# Patient Record
Sex: MALE | Race: WHITE | Employment: OTHER | URBAN - NONMETROPOLITAN AREA
[De-identification: names, ages, dates, MRNs, and addresses within clinical notes are randomized per-mention and may not be internally consistent; named-entity substitution may affect disease eponyms.]

---

## 2019-01-03 RX ORDER — OXYCODONE HCL 10 MG/1
10 TABLET, FILM COATED, EXTENDED RELEASE ORAL EVERY 12 HOURS
COMMUNITY
End: 2019-04-15

## 2019-01-03 RX ORDER — GABAPENTIN 400 MG/1
400 CAPSULE ORAL 3 TIMES DAILY
COMMUNITY
End: 2019-04-15 | Stop reason: ALTCHOICE

## 2019-01-03 RX ORDER — TRAMADOL HYDROCHLORIDE 50 MG/1
50 TABLET ORAL EVERY 6 HOURS PRN
COMMUNITY
End: 2019-05-15 | Stop reason: SDUPTHER

## 2019-01-03 RX ORDER — IBUPROFEN 800 MG/1
800 TABLET ORAL EVERY 6 HOURS PRN
COMMUNITY
End: 2019-04-15

## 2019-04-15 ENCOUNTER — OFFICE VISIT (OUTPATIENT)
Dept: PAIN MANAGEMENT | Age: 59
End: 2019-04-15
Payer: MEDICARE

## 2019-04-15 VITALS
HEART RATE: 64 BPM | SYSTOLIC BLOOD PRESSURE: 138 MMHG | WEIGHT: 175 LBS | DIASTOLIC BLOOD PRESSURE: 80 MMHG | BODY MASS INDEX: 22.46 KG/M2 | HEIGHT: 74 IN

## 2019-04-15 DIAGNOSIS — M51.34 DDD (DEGENERATIVE DISC DISEASE), THORACIC: ICD-10-CM

## 2019-04-15 DIAGNOSIS — M47.812 CERVICAL FACET JOINT SYNDROME: Primary | ICD-10-CM

## 2019-04-15 DIAGNOSIS — M54.6 CHRONIC THORACIC BACK PAIN, UNSPECIFIED BACK PAIN LATERALITY: ICD-10-CM

## 2019-04-15 DIAGNOSIS — G89.29 CHRONIC THORACIC BACK PAIN, UNSPECIFIED BACK PAIN LATERALITY: ICD-10-CM

## 2019-04-15 DIAGNOSIS — M54.2 CERVICALGIA: ICD-10-CM

## 2019-04-15 PROCEDURE — 99203 OFFICE O/P NEW LOW 30 MIN: CPT | Performed by: NURSE PRACTITIONER

## 2019-04-15 RX ORDER — ACETAMINOPHEN 500 MG
500 TABLET ORAL EVERY 6 HOURS PRN
COMMUNITY
End: 2020-03-25

## 2019-04-15 RX ORDER — CYCLOBENZAPRINE HCL 10 MG
10 TABLET ORAL 3 TIMES DAILY PRN
Qty: 90 TABLET | Refills: 2 | Status: SHIPPED | OUTPATIENT
Start: 2019-04-15 | End: 2019-09-20 | Stop reason: SDUPTHER

## 2019-04-15 RX ORDER — GABAPENTIN 600 MG/1
600 TABLET ORAL 3 TIMES DAILY
Qty: 90 TABLET | Refills: 3 | Status: SHIPPED | OUTPATIENT
Start: 2019-04-15 | End: 2019-09-20 | Stop reason: SDUPTHER

## 2019-04-15 ASSESSMENT — ENCOUNTER SYMPTOMS: BACK PAIN: 1

## 2019-04-15 NOTE — PROGRESS NOTES
[facility] will be faxing over surgery history, and Dr Hardeep Calvert office will fax over the information as well

## 2019-04-15 NOTE — PROGRESS NOTES
Subjective:      Patient ID: Alton Fraley is a 60 y.o. male. Chief Complaint   Patient presents with    Neck Pain     Neck pain radiates into upper back    Other     The patient states that he has had 3 neck surgeries by Dr. Jl Stevens Other     The patient did over 2 months of PT and chiropractic care in the past.      HPI Initial new patient consult. Chronic neck pain, radiates right arm, tingling. He has underwent three cervical surgeries, surgeon is telling him he may need a 1th. Has never underwent cervical epidurals. Back (thoracic) pain worse than neck. He has underwent physical therapy, provided short term relief. He has trialed Percocet, oxycontin (improved his sleep), methadone in the past with relief. Denies overtaking of opiates. Failed OTC NSAIDs, reflux. Muscle relaxers in the past have helped, does not recall which ones. Tramadol prn without significant relief, requesting alternate opiate     Pain Assessment  Location of Pain: Neck  Location Modifiers: (pain in neck and in between shoulder blades. Headaches)  Severity of Pain: 8  Quality of Pain: Sharp(stiff)  Duration of Pain: Persistent  Frequency of Pain: Constant  Aggravating Factors: Bending(pressure, driving)  Limiting Behavior: Yes  Relieving Factors: (muscle relaxers. Tramadol)  Are there other pain locations you wish to document?: No    Allergies   Allergen Reactions    Codeine Itching    Diclofenac Nausea Only     dizziness       Outpatient Medications Marked as Taking for the 4/15/19 encounter (Office Visit) with ADOLPH Latif CNP   Medication Sig Dispense Refill    TAMSULOSIN HCL PO Take 0.5 mg by mouth      Probiotic Product (PROBIOTIC PO) Take by mouth      acetaminophen (TYLENOL) 500 MG tablet Take 500 mg by mouth every 6 hours as needed for Pain      gabapentin (NEURONTIN) 400 MG capsule Take 400 mg by mouth 3 times daily. [place] Eber traMADol (ULTRAM) 50 MG tablet Take 50 mg by mouth every 6 hours as needed for Pain. Dois Hallmark metoprolol (LOPRESSOR) 50 MG tablet Take 1 tablet by mouth 2 times daily. 60 tablet 1    albuterol-ipratropium (COMBIVENT)  MCG/ACT inhaler Inhale 2 puffs into the lungs 4 times daily as needed for Wheezing. 1 Inhaler 3    sucralfate (CARAFATE) 1 GM/10ML suspension Take 1 g by mouth 4 times daily (before meals and nightly).  ALPRAZolam (XANAX) 0.5 MG tablet Take 0.5 mg by mouth nightly as needed for Sleep.          Past Medical History:   Diagnosis Date    Anxiety     Back pain     Cervical neuropathy     CHF (congestive heart failure) (HCC)     Depression     ETOH abuse     HTN (hypertension)     Hyperlipidemia     Impaired memory     Prostate disease     Sleep apnea     Thrombocytopenia (HCC)        Past Surgical History:   Procedure Laterality Date    BACK SURGERY      4 or 5 plates implanted    CARDIAC SURGERY      stents x 1    COLONOSCOPY      FEMORAL LINE  3/1/2014         FRACTURE SURGERY      left shoulder, right leg    NECK SURGERY      PROSTATE BIOPSY         Family History   Problem Relation Age of Onset    Cancer Father        Social History     Socioeconomic History    Marital status:      Spouse name: None    Number of children: None    Years of education: None    Highest education level: None   Occupational History    None   Social Needs    Financial resource strain: None    Food insecurity:     Worry: None     Inability: None    Transportation needs:     Medical: None     Non-medical: None   Tobacco Use    Smoking status: Current Every Day Smoker     Packs/day: 1.00     Years: 25.00     Pack years: 25.00    Smokeless tobacco: Never Used   Substance and Sexual Activity    Alcohol use: Yes     Comment: 4-5 wine coolers daily, former beer and liquor drinker    Drug use: No    Sexual activity: None   Lifestyle    Physical activity:     Days per week: None     Minutes per session: None    Stress: None   Relationships    Social connections:     Talks on phone: None     Gets together: None     Attends Jainism service: None     Active member of club or organization: None     Attends meetings of clubs or organizations: None     Relationship status: None    Intimate partner violence:     Fear of current or ex partner: None     Emotionally abused: None     Physically abused: None     Forced sexual activity: None   Other Topics Concern    None   Social History Narrative    None     Review of Systems   Endocrine: Positive for cold intolerance. Musculoskeletal: Positive for arthralgias, back pain and neck pain. Skin: Negative. Neurological: Positive for numbness (right foot) and headaches. Psychiatric/Behavioral: Positive for sleep disturbance. Objective:   Physical Exam   Constitutional: He is oriented to person, place, and time. He appears well-developed and well-nourished. No distress. He is not intubated. HENT:   Head: Normocephalic and atraumatic. Pulmonary/Chest: Effort normal. No accessory muscle usage. No apnea, no tachypnea and no bradypnea. He is not intubated. No respiratory distress. Musculoskeletal:        Cervical back: He exhibits pain. Thoracic back: He exhibits pain (upper thoracic). Back:    Neurological: He is alert and oriented to person, place, and time. No cranial nerve deficit. Skin: Skin is warm, dry and intact. Capillary refill takes less than 2 seconds. He is not diaphoretic. No cyanosis. No pallor. Nails show no clubbing. Psychiatric: He has a normal mood and affect. His speech is normal and behavior is normal. Judgment normal. He is not agitated, not aggressive, not withdrawn and not combative. He does not express impulsivity or inappropriate judgment. Vitals reviewed. Assessment:      1. Cervical facet joint syndrome    2. Cervicalgia    3. DDD (degenerative disc disease), thoracic    4.  Chronic thoracic back pain, unspecified back pain laterality          Plan: Will attempt to get further records in regards to history overdose 2014, he denies. States he quit drinking, has not drank in 15-20 years. Will change gabapentin 600mg, add flexeril 10mg tid prn. Thoracic MRI reviewed, will schedule thoracic interlaminar, to be scheduled [facility].          Jay Pike, APRN - CNP

## 2019-04-15 NOTE — Clinical Note
Please attempt to get more detailed spine surgeries added into his history. States he has never had lumbar surgery, cervical surgery x3, Dr. Re Montes in [facility], surgeries done at [facility].  Please find out if he has had any updated MRI

## 2019-04-15 NOTE — PATIENT INSTRUCTIONS
[facility]  [address] [place], [address]  Telephone [phone]  Fax [phone]      PROCEDURE INSTRUCTIONS FOR  PAIN MANAGEMENT PROCEDURES WITHOUT IV SEDATION    Diana Cooper scheduled to see Dr. Quintin Day to undergo the following procedure:  C7/T1 interlaminar epidural steroid injection    Procedure Date: ____4/19/19____  Arrival Time: ____9:45am____     Report to the [place] [id number], Registration office on the 1st floor in the hospital, after check in and signing of paper work you will then go to the second floor to the surgery center. 1. Stop the following medications prior to the procedure:    Aspirin  Date__4/15/19__ : Stop blood thinners as directed before the injection, with permission from your cardiologist or primary care physician. We will send a letter to them requesting permission to hold the blood thinners. · Medication___Aspirin___ held for __3__ days    2. Take all routine medications unless otherwise instructed. Ok to take vitamins and antiinflammatory medications    3. EATING & DRINKING:  Ok to eat or drink before the injection - no IV sedation will be used. 4. If you are allergic to contrast or iodine, you must take benadryl and prednisone prior to the injection to prevent an allergic reaction. Follow the directions on the prescription for the times to take the medication. 5. Oral valium can be prescribed if your are anxious about the injections or feel that you can not lay still during the injection. If you take valium, you must have a . Make arrangements for a family member or friend to drive you to the surgery center. Your ride must stay in the hospital while you are having the injection done. If they cannot stay, the injection will be rescheduled. The valium may affect your judgment following the procedure and driving a vehicle within 24 hours after the sedation could be dangerous.     6.  Wear simple loose clothing, which can be easily changed. 7.  Leave jewelry (including rings) and other valuables at home. 8.  You will be asked to sign several forms prior to surgery; patients under the age of 25 must have a parent or legal guardian sign the permit to be able to do the procedure. 9.  You must have finished any antibiotic prescribed for recent infections. If required, please take pre-procedure antibiotic or other pre-procedure medications as instructed. 10. Bring inhalers and pain medications with you to your procedure. 11. Bring your MRI/CT films if they were done outside of the [facility]. 12. If you should develop a cold, sore throat, cough, fever or other new indication of illness or infection, or are started on antibiotics within 2 weeks of the scheduled procedure, please notify the [facility] office as early as possible at [phone].

## 2019-04-26 ENCOUNTER — HOSPITAL ENCOUNTER (OUTPATIENT)
Age: 59
Setting detail: OUTPATIENT SURGERY
Discharge: HOME | End: 2019-04-26
Attending: PHYSICAL MEDICINE & REHABILITATION | Admitting: PHYSICAL MEDICINE & REHABILITATION
Payer: MEDICARE

## 2019-04-26 ENCOUNTER — APPOINTMENT (OUTPATIENT)
Dept: GENERAL RADIOLOGY | Age: 59
End: 2019-04-26
Attending: PHYSICAL MEDICINE & REHABILITATION
Payer: MEDICARE

## 2019-04-26 VITALS
TEMPERATURE: 97.3 F | WEIGHT: 172.8 LBS | OXYGEN SATURATION: 99 % | BODY MASS INDEX: 22.18 KG/M2 | HEIGHT: 74 IN | DIASTOLIC BLOOD PRESSURE: 98 MMHG | HEART RATE: 52 BPM | SYSTOLIC BLOOD PRESSURE: 147 MMHG | RESPIRATION RATE: 16 BRPM

## 2019-04-26 PROBLEM — M54.12 CERVICAL RADICULITIS: Status: ACTIVE | Noted: 2019-04-26

## 2019-04-26 PROCEDURE — 6360000004 HC RX CONTRAST MEDICATION: Performed by: PHYSICAL MEDICINE & REHABILITATION

## 2019-04-26 PROCEDURE — 3209999900 FLUORO FOR SURGICAL PROCEDURES

## 2019-04-26 PROCEDURE — 2580000003 HC RX 258: Performed by: PHYSICAL MEDICINE & REHABILITATION

## 2019-04-26 PROCEDURE — 6360000002 HC RX W HCPCS: Performed by: PHYSICAL MEDICINE & REHABILITATION

## 2019-04-26 PROCEDURE — 3600000054 HC PAIN LEVEL 3 BASE: Performed by: PHYSICAL MEDICINE & REHABILITATION

## 2019-04-26 PROCEDURE — 7100000010 HC PHASE II RECOVERY - FIRST 15 MIN: Performed by: PHYSICAL MEDICINE & REHABILITATION

## 2019-04-26 PROCEDURE — 62321 NJX INTERLAMINAR CRV/THRC: CPT | Performed by: PHYSICAL MEDICINE & REHABILITATION

## 2019-04-26 PROCEDURE — 2709999900 HC NON-CHARGEABLE SUPPLY: Performed by: PHYSICAL MEDICINE & REHABILITATION

## 2019-04-26 PROCEDURE — 2500000003 HC RX 250 WO HCPCS: Performed by: PHYSICAL MEDICINE & REHABILITATION

## 2019-04-26 RX ORDER — DEXAMETHASONE SODIUM PHOSPHATE 10 MG/ML
INJECTION INTRAMUSCULAR; INTRAVENOUS PRN
Status: DISCONTINUED | OUTPATIENT
Start: 2019-04-26 | End: 2019-04-26 | Stop reason: ALTCHOICE

## 2019-04-26 RX ORDER — BUPIVACAINE HYDROCHLORIDE 2.5 MG/ML
INJECTION, SOLUTION EPIDURAL; INFILTRATION; INTRACAUDAL PRN
Status: DISCONTINUED | OUTPATIENT
Start: 2019-04-26 | End: 2019-04-26 | Stop reason: ALTCHOICE

## 2019-04-26 RX ORDER — 0.9 % SODIUM CHLORIDE 0.9 %
VIAL (ML) INJECTION PRN
Status: DISCONTINUED | OUTPATIENT
Start: 2019-04-26 | End: 2019-04-26 | Stop reason: ALTCHOICE

## 2019-04-26 ASSESSMENT — PAIN SCALES - GENERAL: PAINLEVEL_OUTOF10: 5

## 2019-04-26 ASSESSMENT — PAIN - FUNCTIONAL ASSESSMENT: PAIN_FUNCTIONAL_ASSESSMENT: 0-10

## 2019-04-26 ASSESSMENT — PAIN DESCRIPTION - PAIN TYPE: TYPE: CHRONIC PAIN

## 2019-04-26 NOTE — INTERVAL H&P NOTE
I have interviewed and examined the patient and reviewed the recent History and Physical.  There have been no changes to the recent H&P documentation. The surgical consent form has been signed. Last anticoagulant medication use was:na    Premedication taken for contrast allergy? No    Valium taken for oral sedation? No    Outpatient Medications Marked as Taking for the 4/26/19 encounter [facility] Encounter)   Medication Sig Dispense Refill    Probiotic Product (PROBIOTIC PO) Take by mouth      gabapentin (NEURONTIN) 600 MG tablet Take 1 tablet by mouth 3 times daily for 30 days. 90 tablet 3    cyclobenzaprine (FLEXERIL) 10 MG tablet Take 1 tablet by mouth 3 times daily as needed for Muscle spasms 90 tablet 2    traMADol (ULTRAM) 50 MG tablet Take 50 mg by mouth every 6 hours as needed for Pain. [place] Abhishek metoprolol (LOPRESSOR) 50 MG tablet Take 1 tablet by mouth 2 times daily. 60 tablet 1    albuterol-ipratropium (COMBIVENT)  MCG/ACT inhaler Inhale 2 puffs into the lungs 4 times daily as needed for Wheezing. 1 Inhaler 3    sucralfate (CARAFATE) 1 GM/10ML suspension Take 1 g by mouth 4 times daily (before meals and nightly). The patient understands the planned operation and its associated risks and benefits and agrees to proceed.         Electronically signed by Rambolian Valenzuela MD on 4/26/2019 at 9:50 AM

## 2019-04-26 NOTE — OP NOTE
INTERLAMINAR EPIDURAL STEROID INJECTION    4/26/19    Surgeon: Mitali Rodriguez MD    Pre-operative Diagnosis:   Patient Active Problem List   Diagnosis Code    Respiratory distress R06.03    Leukocytosis D72.829    Methadone dependence ([facility] [address] F11.20    Alcohol abuse F10.10    Hyponatremia E87.1    S/P cardiac cath - No CAD (3/3/14-Dr. ascencio) D18.516    Acute and chronic respiratory failure with hypercapnia (HCC) J96.22    Overdose of opiate or related narcotic ([facility] [address] T40.601A    COPD (chronic obstructive pulmonary disease) ([facility] [address] J44.9    YOLANDA (obstructive sleep apnea) G47.33    Cervical facet joint syndrome M47.812    Cervicalgia M54.2    DDD (degenerative disc disease), thoracic M51.34    Chronic thoracic back pain M54.6, G89.29    Cervical radiculitis M54.12       Post-operative Diagnosis: Same    Assistants: none    INDICATION:  Interlaminar epidural injection has been requested for diagnostic and therapeutic reasons. Please see H&P for details on previous treatments, examination findings, and work up. Conservative treatment was ineffective i.e.: ice, NSAIDS, rest,     Patient is unable to perform the following ADL's: bathing, toileting, personal cares, ambulating, sitting and standing     Pain Assessment: 0-10  Pain Level: 7        Pain Location: Neck, Shoulder       Last Plain films: 2018    EXAMINATION:  1. C7-T1 Interlaminar radiculogram/epidurogram.   2. C7-T1 Interlaminar epidural anesthetic injection. 3. C7-T1 Interlaminar epidural steroid injection. CONSENT: Written consent was obtained from the patient on preprinted consent form after explaining the procedure, indications, potential complications and outcomes. Alternative treatments were also discussed. DISCUSSION: The patient was sterilely prepped and draped in the usual fashion in the   prone position. Time out was verified for correct patient, side, level and procedure.     SEDATION: No conscious sedation was performed during the procedure. The patient remained awake and conversed throughout the procedure. The patient underwent pulse oximetry and blood pressure monitoring independently by a trained observer, as well as by a physician. PROCEDURE:  Under image-intensifier control, a 25 gauge needle x 2.5 inch spinal needle was guided successfully into the epidural space employing a posterior midline/paramedian interlaminar approach. Needle aspiration was negative for heme or CSF. Instillation of  .5 mL of Omnipaque 240 contrast medium opacified the spinal nerve and demonstrated contiguous flow into the epidural space. No vascular spread was noted. Digital subtraction was not employed to evaluate for vascular spread. The patient was monitored for any untoward reaction to contrast medium before proceeding. The patient did not report pain reproduction in a concordant distribution. Following needle position verification, a test dose of  .5 mL of sterile normal saline was administered and patient monitored for any adverse effects. Then, 1 mL of Dexamethasone (Decadron 10 mg/mL) was instilled into the epidural space and the patient's response was again monitored. Finally, 1ml of 0.5% bupivacaine was then instilled. The patient's response was again monitored. The spinal needle was removed, and the patient's pain response, gait pattern, sensorimotor examination and range of motion were examined. The patient tolerated the procedure well and without complications and was noted to be in stable condition prior to discharge from the procedure center with discharge instructions. IMPRESSIONS:  1. C7-T1 Interlaminar epidurogram, epidural anesthesia and epidural steroid injection procedures accomplished without incident. EBL: no blood loss    SPECIMEN: none    RECOMMENDATIONS:  1. Complete and return Post-Procedure Pain and Activity Diary.    2. Contact the [address] for symptom exacerbation, fever or unusual symptoms. 3. Post-procedure care according to verbal and written discharge instructions    POST-PROCEDURE EPIDUROGRAPHY INTERPRETATION:    EXAMINATION: AP, lateral views. FLUORO TIME: 23 seconds    DISCUSSION: Spot views of the spine reveal normal alignment and segmentation. Spinal needle is positioned at the C7-T1 interlaminar space. Contrast spreads and outlines the epidural space. The epidurogram reveals excellent contrast flow. Visualized spine reveals See radiology report. Soft tissues reveal no abnormalities. IMPRESSION: C7-T1 interlaminar epidurogram/epineurogram reveals satisfactory needle position and contrast spread.      Electronically signed by Simon Contreras MD on 4/26/2019 at 9:50 AM

## 2019-05-15 ENCOUNTER — OFFICE VISIT (OUTPATIENT)
Dept: PAIN MANAGEMENT | Age: 59
End: 2019-05-15
Payer: MEDICARE

## 2019-05-15 VITALS — DIASTOLIC BLOOD PRESSURE: 80 MMHG | SYSTOLIC BLOOD PRESSURE: 115 MMHG

## 2019-05-15 DIAGNOSIS — F41.9 ANXIETY: ICD-10-CM

## 2019-05-15 DIAGNOSIS — M54.12 CERVICAL RADICULOPATHY: Primary | ICD-10-CM

## 2019-05-15 PROCEDURE — 99214 OFFICE O/P EST MOD 30 MIN: CPT | Performed by: PHYSICAL MEDICINE & REHABILITATION

## 2019-05-15 RX ORDER — TRAMADOL HYDROCHLORIDE 50 MG/1
50 TABLET ORAL EVERY 6 HOURS PRN
Qty: 120 TABLET | Refills: 0 | Status: SHIPPED | OUTPATIENT
Start: 2019-05-15 | End: 2019-06-26 | Stop reason: SDUPTHER

## 2019-05-15 RX ORDER — MIRTAZAPINE 15 MG/1
TABLET, FILM COATED ORAL
COMMUNITY
End: 2019-06-26

## 2019-05-15 RX ORDER — HYDRALAZINE HYDROCHLORIDE 25 MG/1
TABLET, FILM COATED ORAL
COMMUNITY
End: 2019-06-26

## 2019-05-15 ASSESSMENT — ENCOUNTER SYMPTOMS
RESPIRATORY NEGATIVE: 1
NAUSEA: 0
ALLERGIC/IMMUNOLOGIC NEGATIVE: 1
CONSTIPATION: 0
EYES NEGATIVE: 1
BACK PAIN: 1

## 2019-05-15 NOTE — PROGRESS NOTES
PAIN MANAGEMENT FOLLOW-UP NOTE  5/15/19    CHIEF COMPLAINT: This is a59 y.o. male patientwho returns to the [facility] with a history of Injections (put more pressure on the area) and Neck Pain (previous injury on neck (axle came down on head) neck surgeon would like to do another surgery has already had 3 surgeries)      Gonzalo Santaan returns today for  reevaluation. Since the visit, the patient reports that the pain is not changed. C7-T1 IESI not helpful    still paininn R and L neck  Slightly into R  arm     Location: Neck  Location Modifier: cervical  Severity of Pain: 8  Duration of Pain: Persistent  Frequency of Pain: Constant  Aggravating Factors: bending sideways  Limiting Behavior: Walking  Relieving Factors: sleep        Previous pain medication trials have included:          Mental health:    Patient feels - secondary to their current pain problems as described above. H/O depression and anxiety: No   Patient is not seeing psychologist orpsychiatrist   Abuse history? No    Employed? No    ANALGESIA:   Are your Current Pain medication (s) helping to decrease pain? No.   Current Pain score: Pain Score:   5    ADVERSE AFFECTS:   Medication Side Effects: No.    ACTIVITY:  Are you able to be more active with your pain medications? No      ABERRANT BEHAVIORS SINCE LAST VISIT? No    Review of Systems   Constitutional: Positive for fatigue. HENT: Negative. Eyes: Negative. Respiratory: Negative. Cardiovascular: Negative. Gastrointestinal: Negative for constipation and nausea. Endocrine: Negative. Genitourinary: Negative for difficulty urinating. Musculoskeletal: Positive for arthralgias, back pain and myalgias. Skin: Negative. Allergic/Immunologic: Negative. Neurological: Positive for weakness and numbness. Hematological: Negative. Psychiatric/Behavioral: Positive for sleep disturbance. All other systems reviewed and are negative.        Allergies   Allergen Reactions    Codeine Itching    Diclofenac Nausea Only     dizziness       Outpatient Medications Prior to Visit   Medication Sig Dispense Refill    hydrALAZINE (APRESOLINE) 25 MG tablet hydralazine 25 mg tablet   Take 1 tablet twice a day by oral route.  mirtazapine (REMERON) 15 MG tablet mirtazapine 15 mg tablet   Take 1 tablet every day by oral route.  TAMSULOSIN HCL PO Take 0.5 mg by mouth      Probiotic Product (PROBIOTIC PO) Take by mouth      acetaminophen (TYLENOL) 500 MG tablet Take 500 mg by mouth every 6 hours as needed for Pain      gabapentin (NEURONTIN) 600 MG tablet Take 1 tablet by mouth 3 times daily for 30 days. 90 tablet 3    cyclobenzaprine (FLEXERIL) 10 MG tablet Take 1 tablet by mouth 3 times daily as needed for Muscle spasms 90 tablet 2    traMADol (ULTRAM) 50 MG tablet Take 50 mg by mouth every 6 hours as needed for Pain. Suzzane Sae metoprolol (LOPRESSOR) 50 MG tablet Take 1 tablet by mouth 2 times daily. 60 tablet 1    albuterol-ipratropium (COMBIVENT)  MCG/ACT inhaler Inhale 2 puffs into the lungs 4 times daily as needed for Wheezing. 1 Inhaler 3    sucralfate (CARAFATE) 1 GM/10ML suspension Take 1 g by mouth 4 times daily (before meals and nightly).  ALPRAZolam (XANAX) 0.5 MG tablet Take 0.5 mg by mouth nightly as needed for Sleep. No facility-administered medications prior to visit.         Past Medical History:   Diagnosis Date    Anxiety     Back pain     Cervical neuropathy     CHF (congestive heart failure) (HCC)     Depression     ETOH abuse     HTN (hypertension)     Hyperlipidemia     Impaired memory     Prostate disease     Sleep apnea     Thrombocytopenia (HCC)        Past Surgical History:   Procedure Laterality Date    BACK SURGERY      4 or 5 plates implanted    CARDIAC SURGERY      stents x 1    CERVICAL SPINE SURGERY N/A 4/26/2019    C7/T1 INTERLAMINAR (CPT 78454) performed [facility] FEMORAL LINE  3/1/2014         FRACTURE SURGERY      left shoulder, right leg    NECK SURGERY      PROSTATE BIOPSY       Family History   Problem Relation Age of Onset    Cancer Father      Social History     Socioeconomic History    Marital status:      Spouse name: None    Number of children: None    Years of education: None    Highest education level: None   Occupational History    None   Social Needs    Financial resource strain: None    Food insecurity:     Worry: None     Inability: None    Transportation needs:     Medical: None     Non-medical: None   Tobacco Use    Smoking status: Current Every Day Smoker     Packs/day: 1.00     Years: 25.00     Pack years: 25.00    Smokeless tobacco: Never Used   Substance and Sexual Activity    Alcohol use: Not Currently     Comment: former daily    Drug use: No    Sexual activity: None   Lifestyle    Physical activity:     Days per week: None     Minutes per session: None    Stress: None   Relationships    Social connections:     Talks on phone: None     Gets together: None     Attends Taoist service: None     Active member of club or organization: None     Attends meetings of clubs or organizations: None     Relationship status: None    Intimate partner violence:     Fear of current or ex partner: None     Emotionally abused: None     Physically abused: None     Forced sexual activity: None   Other Topics Concern    None   Social History Narrative    None         Family and Social Historyreviewed in the electronic medical record. Imaging:Reviewed available imaging in our system with the patient. No results found. Objective:     Physical Exam:  Vitals:    05/15/19 1134   BP: 115/80   Site: Left Upper Arm   Position: Sitting   Cuff Size: Large Adult     Pain Score:   5    Physical Exam   Constitutional: He is oriented to person, place, and time. He appears well-developed and well-nourished.    HENT:   Head: Normocephalic and atraumatic. Mouth/Throat: Oropharynx is clear and moist and mucous membranes are normal.   Cardiovascular: Intact distal pulses and normal pulses. Warm extremities. Normal capillary refill. Pulmonary/Chest: Effort normal.   Regular respiratory rate. No acute dyspnea. No audible wheezing. Neurological: He is alert and oriented to person, place, and time. He has normal strength and normal reflexes. He displays no atrophy. No cranial nerve deficit or sensory deficit. He exhibits normal muscle tone. Skin: Skin is warm, dry and intact. Psychiatric: He has a normal mood and affect. His speech is normal and behavior is normal.     Back Exam     Range of Motion   Extension: normal   Flexion: normal   Lateral bend right: normal   Lateral bend left: normal   Rotation right: normal   Rotation left: normal     Muscle Strength   Right Quadriceps:  5/5   Left Quadriceps:  5/5   Right Hamstrings:  5/5   Left Hamstrings:  5/5     Tests   Straight leg raise right: negative  Straight leg raise left: negative    Other   Toe walk: normal  Heel walk: normal  Sensation: normal  Gait: normal                                      Assessment: This is a 60 y.o. male patient with:    Diagnosis:   Diagnosis Orders   1. Cervical radiculopathy     2. Anxiety         Medical Comorbidities:  As listed in the patient's past medical and surgical history    Functional Limitations:   Pain limits function and quality of life. Plan:   Hold on another Cervical C7 T1 IESI  Back to Dr. Mary Anne Fonseca to eval hardware     Meds:   Controlled Substances Monitoring: Pt educated about the risks of taking opiates,including increased sedation, constipation, slowed breathing, tolerance, dependence,and addiction. New Prescriptions    No medications on file      No orders of the defined types were placed in this encounter. No orders of the defined types were placed in this encounter. No follow-ups on file.     The patient expressed understanding of the above assessment and plan. Totaltime spent face to face with patient was 25 minutes inwhich  50% or more of the time was spent in counseling, education about risk andbenefits of the above plan, and coordination of care.

## 2019-06-20 DIAGNOSIS — M54.16 LUMBAR RADICULITIS: Primary | ICD-10-CM

## 2019-06-20 RX ORDER — TRAMADOL HYDROCHLORIDE 50 MG/1
50 TABLET ORAL SEE ADMIN INSTRUCTIONS
Qty: 240 TABLET | Refills: 0 | OUTPATIENT
Start: 2019-06-20 | End: 2019-07-20

## 2019-06-20 NOTE — TELEPHONE ENCOUNTER
Writer called the patient back and asked that he set up an OV. The patient stated that he just saw Dr. Gretel Garrett on 5/15/19 and was told that he was allowed to take tramadol 2 tabs 4 times a day. The patient would like Dr. Gretel Garrett to send in the new prescription. The patient was upset and stated he was not impressed by Galen Villaseñor and does not want to see her for follow ups, he also stated that he was upset that Dr. Gretel Garrett did not review his MRI and he did not send in a new prescription like he said he would. He was also upset that the procedure was not effective. Writer explained to the patient that it was not documented that the patient would be getting an increased dose of tramadol, so if it is something that was discussed then the office was not aware, however, it will be addressed with Dr. Gretel Garrett. The patient was informed that he will have to make a follow up with one of the providers in order to continue to get medication from the office. The patient stated that he will schedule an OV with Dr. Gretel Garrett, but not at this time. The patient is requesting that Tramadol 50mg 2tab q6hrs be sent to his pharmacy. Please advise.      Last Appt:  5/15/2019  Next Appt:   Visit date not found  Med verified in [address] [place] [address] Rd

## 2019-06-20 NOTE — TELEPHONE ENCOUNTER
Patient called the office, asking for a refill on Tramadol 50 mg. Pt stated that at the last OV with Dr. Britany Ward, told him that if 1 tablet was not enough to take 2 tablets every 6 hours    Writer reviewed the last OV plan: Hold on another Cervical C7 T1 IESI  Back to Dr. Lazo Chain to eval hardware     The directions on the script says 1 tablet every 6 hours. Pt has only a few days left.      Last Appt:  4/15/2019  Next Appt:   Visit date not found  Med verified in [address] [place] [address] Rd      Would you like the pt to schedule an OV to discuss

## 2019-06-26 ENCOUNTER — OFFICE VISIT (OUTPATIENT)
Dept: PAIN MANAGEMENT | Age: 59
End: 2019-06-26
Payer: MEDICARE

## 2019-06-26 VITALS
HEIGHT: 74 IN | BODY MASS INDEX: 23.1 KG/M2 | SYSTOLIC BLOOD PRESSURE: 122 MMHG | WEIGHT: 180 LBS | DIASTOLIC BLOOD PRESSURE: 80 MMHG

## 2019-06-26 DIAGNOSIS — F41.9 ANXIETY: ICD-10-CM

## 2019-06-26 DIAGNOSIS — M96.1 CERVICAL POST-LAMINECTOMY SYNDROME: ICD-10-CM

## 2019-06-26 DIAGNOSIS — M79.673 PAIN OF FOOT, UNSPECIFIED LATERALITY: ICD-10-CM

## 2019-06-26 DIAGNOSIS — Z79.891 ENCOUNTER FOR LONG-TERM OPIATE ANALGESIC USE: Primary | ICD-10-CM

## 2019-06-26 DIAGNOSIS — F10.11 HISTORY OF ALCOHOL ABUSE: ICD-10-CM

## 2019-06-26 DIAGNOSIS — M54.12 CERVICAL RADICULOPATHY: ICD-10-CM

## 2019-06-26 DIAGNOSIS — F32.1 CURRENT MODERATE EPISODE OF MAJOR DEPRESSIVE DISORDER, UNSPECIFIED WHETHER RECURRENT (HCC): ICD-10-CM

## 2019-06-26 PROCEDURE — 99214 OFFICE O/P EST MOD 30 MIN: CPT | Performed by: PHYSICAL MEDICINE & REHABILITATION

## 2019-06-26 RX ORDER — TRAMADOL HYDROCHLORIDE 50 MG/1
100 TABLET ORAL EVERY 6 HOURS PRN
Qty: 240 TABLET | Refills: 0 | Status: SHIPPED | OUTPATIENT
Start: 2019-06-26 | End: 2019-08-13 | Stop reason: SDUPTHER

## 2019-06-26 ASSESSMENT — ENCOUNTER SYMPTOMS
EYES NEGATIVE: 1
BACK PAIN: 1
NAUSEA: 0
ALLERGIC/IMMUNOLOGIC NEGATIVE: 1
RESPIRATORY NEGATIVE: 1
CONSTIPATION: 0

## 2019-06-26 NOTE — PROGRESS NOTES
PAIN MANAGEMENT FOLLOW-UP NOTE  6/26/19    CHIEF COMPLAINT: This is a59 y.o. male patientwho [facility] with a history of Neck Pain (the patient was taking 2 tramadol q6hrs, be stated that he was told that this would be okay at his last visit. The patient would like to prescription to be sent in for this dose.)      Joselito Banegas returns today for  reevaluation. Since the visit, the patient reports that the pain is not changed. States  pain in  R neck different from B  Neck  Pain prior to surgery No new pain down arms   R foot numb      Location: Back  Location Modifier: entire back, cervical  Severity of Pain: 8  Duration of Pain: Persistent  Frequency of Pain: Intermittent  Aggravating Factors: bending forwards  Limiting Behavior: Standing   Relieving Factors: narcotics        Previous pain medication trials have included:          Mental health:    Patient feels depression and anxiety secondary to their current pain problems as described above. H/O depression and anxiety:yes   Patient is not seeing psychologist orpsychiatrist   Abuse history? Yes alcohol 18 years   Employed? No    ANALGESIA:   Are your CurrenT  Current Pain score: Pain Score:   5    ADVERSE AFFECTS:   Medication Side Effects: No.    ACTIVITY:  Are you able to be more active with your pain medications? No      ABERRANT BEHAVIORS SINCE LAST VISIT? No    Review of Systems   Constitutional: Positive for fatigue. HENT: Negative. Eyes: Negative. Respiratory: Negative. Cardiovascular: Negative. Gastrointestinal: Negative for constipation and nausea. Endocrine: Negative. Genitourinary: Negative for difficulty urinating. Musculoskeletal: Positive for arthralgias, back pain and myalgias. Skin: Negative. Allergic/Immunologic: Negative. Neurological: Positive for weakness and numbness. Hematological: Negative. Psychiatric/Behavioral: Positive for sleep disturbance.    All other systems reviewed and are negative. Allergies   Allergen Reactions    Codeine Itching    Diclofenac Nausea Only     dizziness       Outpatient Medications Prior to Visit   Medication Sig Dispense Refill    TAMSULOSIN HCL PO Take 0.5 mg by mouth      acetaminophen (TYLENOL) 500 MG tablet Take 500 mg by mouth every 6 hours as needed for Pain      gabapentin (NEURONTIN) 600 MG tablet Take 1 tablet by mouth 3 times daily for 30 days. 90 tablet 3    ALPRAZolam (XANAX) 0.5 MG tablet Take 0.5 mg by mouth nightly as needed for Sleep.  hydrALAZINE (APRESOLINE) 25 MG tablet hydralazine 25 mg tablet   Take 1 tablet twice a day by oral route.  mirtazapine (REMERON) 15 MG tablet mirtazapine 15 mg tablet   Take 1 tablet every day by oral route.  Probiotic Product (PROBIOTIC PO) Take by mouth      metoprolol (LOPRESSOR) 50 MG tablet Take 1 tablet by mouth 2 times daily. 60 tablet 1    albuterol-ipratropium (COMBIVENT)  MCG/ACT inhaler Inhale 2 puffs into the lungs 4 times daily as needed for Wheezing. 1 Inhaler 3    sucralfate (CARAFATE) 1 GM/10ML suspension Take 1 g by mouth 4 times daily (before meals and nightly). No facility-administered medications prior to visit.         Past Medical History:   Diagnosis Date    Anxiety     Back pain     Cervical neuropathy     CHF (congestive heart failure) (HCC)     Depression     ETOH abuse     HTN (hypertension)     Hyperlipidemia     Impaired memory     Prostate disease     Sleep apnea     Thrombocytopenia (HCC)        Past Surgical History:   Procedure Laterality Date    BACK SURGERY      4 or 5 plates implanted    CARDIAC SURGERY      stents x 1    CERVICAL SPINE SURGERY N/A 4/26/2019    C7/T1 INTERLAMINAR (CPT 73812) performed by Anna Negrete MD at [address] [place] [address],[address]  3/1/2014         FRACTURE SURGERY      left shoulder, right leg    NECK SURGERY      PROSTATE BIOPSY       Family History   Problem Relation Age of Onset    Cancer Father      Social History     Socioeconomic History    Marital status:      Spouse name: None    Number of children: None    Years of education: None    Highest education level: None   Occupational History    None   Social Needs    Financial resource strain: None    Food insecurity:     Worry: None     Inability: None    Transportation needs:     Medical: None     Non-medical: None   Tobacco Use    Smoking status: Current Every Day Smoker     Packs/day: 1.00     Years: 25.00     Pack years: 25.00    Smokeless tobacco: Never Used   Substance and Sexual Activity    Alcohol use: Not Currently     Comment: former daily    Drug use: No    Sexual activity: None   Lifestyle    Physical activity:     Days per week: None     Minutes per session: None    Stress: None   Relationships    Social connections:     Talks on phone: None     Gets together: None     Attends Sikh service: None     Active member of club or organization: None     Attends meetings of clubs or organizations: None     Relationship status: None    Intimate partner violence:     Fear of current or ex partner: None     Emotionally abused: None     Physically abused: None     Forced sexual activity: None   Other Topics Concern    None   Social History Narrative    None         Family and Social Historyreviewed in the electronic medical record. Imaging:Reviewed available imaging in our system with the patient. No results found. Objective:     Physical Exam:  Vitals:    06/26/19 1138   BP: 122/80   Site: Left Upper Arm   Position: Sitting   Cuff Size: Medium Adult   Weight: 180 lb (81.6 kg)   Height: 6' 2\" (1.88 m)     Pain Score:   5    Physical Exam   Constitutional: He is oriented to person, place, and time. He appears well-developed and well-nourished. HENT:   Head: Normocephalic and atraumatic.    Mouth/Throat: Oropharynx is clear and moist and mucous membranes are normal.   Cardiovascular: Normal rate, intact distal pulses and normal pulses. Warm extremities. Normal capillary refill. Pulmonary/Chest: Effort normal.   Regular respiratory rate. No acute dyspnea. No audible wheezing. Abdominal: Soft. Bowel sounds are normal. He exhibits no distension. Neurological: He is alert and oriented to person, place, and time. He has normal strength and normal reflexes. He displays no atrophy. No cranial nerve deficit or sensory deficit. He exhibits normal muscle tone. Skin: Skin is warm, dry and intact. Psychiatric: He has a normal mood and affect. His speech is normal and behavior is normal.     Back Exam     Tenderness   The patient is experiencing tenderness in the cervical.    Range of Motion   Extension: normal   Flexion: normal   Lateral bend right: normal   Lateral bend left: normal   Rotation right: normal   Rotation left: normal     Muscle Strength   Right Quadriceps:  5/5   Left Quadriceps:  5/5   Right Hamstrings:  5/5   Left Hamstrings:  5/5     Tests   Straight leg raise right: negative  Straight leg raise left: negative    Other   Toe walk: normal  Heel walk: normal  Sensation: normal  Gait: normal     Comments:  Sensorimotor  intact B arms   tender R neck  +spurlings R                                       Assessment: This is a 60 y.o. male patient with:    Diagnosis:   Diagnosis Orders   1. Encounter for long-term opiate analgesic use     2. Cervical radiculopathy  traMADol (ULTRAM) 50 MG tablet   3. Anxiety  traMADol (ULTRAM) 50 MG tablet   4. Cervical post-laminectomy syndrome     5. Pain of foot, unspecified laterality     6. Current moderate episode of major depressive disorder, unspecified whether recurrent (HCC)         Medical Comorbidities:  As listed in the patient's past medical and surgical history    Functional Limitations:   Pain limits function and quality of life.      Plan:     Tramadol  100 Q 6 hours told cannot take more than prescribed   Follow with Dr Zina Randhawa Neurosurgery patient wants to be sure not hardware p;roblem  Patient wants  Second opinion sounds like he will run by Dr Zhanna Tristan   Only been 6 months post surgery w   Meds:   Controlled Substances Monitoring: Pt educated about the risks of taking opiates,including increased sedation, constipation, slowed breathing, tolerance, dependence,and addiction. New Prescriptions    No medications on file      Orders Placed This Encounter   Medications    traMADol (ULTRAM) 50 MG tablet     Sig: Take 2 tablets by mouth every 6 hours as needed for Pain for up to 30 days. Dispense:  240 tablet     Refill:  0     Reduce doses taken as pain becomes manageable     No orders of the defined types were placed in this encounter. No follow-ups on file. The patient expressed understanding of the above assessment and plan. Totaltime spent face to face with patient was 25 minutes inwhich  50% or more of the time was spent in counseling, education about risk andbenefits of the above plan, and coordination of care.

## 2019-07-24 ENCOUNTER — OFFICE VISIT (OUTPATIENT)
Dept: PAIN MANAGEMENT | Age: 59
End: 2019-07-24
Payer: MEDICARE

## 2019-07-24 VITALS
HEIGHT: 74 IN | WEIGHT: 175 LBS | BODY MASS INDEX: 22.46 KG/M2 | DIASTOLIC BLOOD PRESSURE: 72 MMHG | SYSTOLIC BLOOD PRESSURE: 120 MMHG

## 2019-07-24 DIAGNOSIS — Z79.891 ENCOUNTER FOR LONG-TERM OPIATE ANALGESIC USE: ICD-10-CM

## 2019-07-24 DIAGNOSIS — M47.812 CERVICAL FACET JOINT SYNDROME: ICD-10-CM

## 2019-07-24 DIAGNOSIS — M54.12 CERVICAL RADICULOPATHY: Primary | ICD-10-CM

## 2019-07-24 DIAGNOSIS — F10.10 ALCOHOL ABUSE: ICD-10-CM

## 2019-07-24 DIAGNOSIS — T40.601A OPIATE OR RELATED NARCOTIC OVERDOSE, ACCIDENTAL OR UNINTENTIONAL, INITIAL ENCOUNTER (HCC): ICD-10-CM

## 2019-07-24 PROCEDURE — 99214 OFFICE O/P EST MOD 30 MIN: CPT | Performed by: PHYSICAL MEDICINE & REHABILITATION

## 2019-07-24 ASSESSMENT — ENCOUNTER SYMPTOMS
ALLERGIC/IMMUNOLOGIC NEGATIVE: 1
EYES NEGATIVE: 1
NAUSEA: 0
CONSTIPATION: 0
BACK PAIN: 1
RESPIRATORY NEGATIVE: 1

## 2019-07-24 NOTE — PROGRESS NOTES
traMADol (ULTRAM) 50 MG tablet Take 2 tablets by mouth every 6 hours as needed for Pain for up to 30 days. 240 tablet 0    TAMSULOSIN HCL PO Take 0.5 mg by mouth      acetaminophen (TYLENOL) 500 MG tablet Take 500 mg by mouth every 6 hours as needed for Pain      gabapentin (NEURONTIN) 600 MG tablet Take 1 tablet by mouth 3 times daily for 30 days. 90 tablet 3    cyclobenzaprine (FLEXERIL) 10 MG tablet Take 1 tablet by mouth 3 times daily as needed for Muscle spasms 90 tablet 2    albuterol-ipratropium (COMBIVENT)  MCG/ACT inhaler Inhale 2 puffs into the lungs 4 times daily as needed for Wheezing. 1 Inhaler 3    ALPRAZolam (XANAX) 0.5 MG tablet Take 0.5 mg by mouth nightly as needed for Sleep. No facility-administered medications prior to visit.         Past Medical History:   Diagnosis Date    Anxiety     Back pain     Cervical neuropathy     CHF (congestive heart failure) (HCC)     Depression     ETOH abuse     HTN (hypertension)     Hyperlipidemia     Impaired memory     Prostate disease     Sleep apnea     Thrombocytopenia (HCC)        Past Surgical History:   Procedure Laterality Date    BACK SURGERY      4 or 5 plates implanted    CARDIAC SURGERY      stents x 1    CERVICAL SPINE SURGERY N/A 4/26/2019    C7/T1 INTERLAMINAR (CPT 78517) performed by Mirza Treviño MD at [address] [place] [address],[address]  3/1/2014         FRACTURE SURGERY      left shoulder, right leg    NECK SURGERY      PROSTATE BIOPSY       Family History   Problem Relation Age of Onset    Cancer Father      Social History     Socioeconomic History    Marital status:      Spouse name: None    Number of children: None    Years of education: None    Highest education level: None   Occupational History    None   Social Needs    Financial resource strain: None    Food insecurity:     Worry: None     Inability: None    Transportation needs:     Medical: None     Non-medical: None

## 2019-08-13 DIAGNOSIS — F41.9 ANXIETY: ICD-10-CM

## 2019-08-13 DIAGNOSIS — M54.12 CERVICAL RADICULOPATHY: ICD-10-CM

## 2019-08-13 RX ORDER — TRAMADOL HYDROCHLORIDE 50 MG/1
50 TABLET ORAL EVERY 6 HOURS PRN
Qty: 240 TABLET | Refills: 3 | OUTPATIENT
Start: 2019-08-13 | End: 2019-09-12

## 2019-09-20 RX ORDER — GABAPENTIN 600 MG/1
TABLET ORAL
Qty: 90 TABLET | Refills: 3 | Status: SHIPPED | OUTPATIENT
Start: 2019-09-20 | End: 2020-04-13

## 2019-09-20 RX ORDER — CYCLOBENZAPRINE HCL 10 MG
TABLET ORAL
Qty: 90 TABLET | Refills: 2 | Status: SHIPPED | OUTPATIENT
Start: 2019-09-20 | End: 2019-12-13 | Stop reason: SDUPTHER

## 2019-09-20 NOTE — TELEPHONE ENCOUNTER
Pharmacy is requesting a refill of medications    Last Appt:  4/15/2019  Next Appt:   07/24/2019  Med verified in Epic

## 2019-12-13 RX ORDER — CYCLOBENZAPRINE HCL 10 MG
TABLET ORAL
Qty: 90 TABLET | Refills: 0 | Status: SHIPPED | OUTPATIENT
Start: 2019-12-13 | End: 2020-01-10

## 2020-01-10 RX ORDER — CYCLOBENZAPRINE HCL 10 MG
TABLET ORAL
Qty: 90 TABLET | Refills: 5 | Status: SHIPPED | OUTPATIENT
Start: 2020-01-10 | End: 2020-07-20

## 2020-01-10 NOTE — TELEPHONE ENCOUNTER
Pharmacy requesting refill on pharmacy      Last Appt:  07/24/2019  Next Appt:   Visit date not found  Med verified in Epic    Patient has not been seen since July 2019, ok to fill?

## 2020-03-09 NOTE — TELEPHONE ENCOUNTER
Last Appt:  7/24/2019  Next Appt:   3/25/2020  Med verified in Epic    Pt not seen since July will need OV, OV scheduled 3/25/20    [place], and [place]:1/2/20 Tramadol 50 #240. Due NOW.

## 2020-03-10 RX ORDER — TRAMADOL HYDROCHLORIDE 50 MG/1
50 TABLET ORAL EVERY 6 HOURS PRN
Qty: 240 TABLET | Refills: 3 | OUTPATIENT
Start: 2020-03-10 | End: 2020-04-09

## 2020-03-25 ENCOUNTER — TELEPHONE (OUTPATIENT)
Dept: PAIN MANAGEMENT | Age: 60
End: 2020-03-25

## 2020-03-25 ENCOUNTER — OFFICE VISIT (OUTPATIENT)
Dept: PAIN MANAGEMENT | Age: 60
End: 2020-03-25
Payer: MEDICARE

## 2020-03-25 VITALS
DIASTOLIC BLOOD PRESSURE: 80 MMHG | HEIGHT: 74 IN | WEIGHT: 190 LBS | BODY MASS INDEX: 24.38 KG/M2 | SYSTOLIC BLOOD PRESSURE: 142 MMHG

## 2020-03-25 PROCEDURE — 99214 OFFICE O/P EST MOD 30 MIN: CPT | Performed by: PHYSICAL MEDICINE & REHABILITATION

## 2020-03-25 RX ORDER — TRAMADOL HYDROCHLORIDE 50 MG/1
50 TABLET ORAL EVERY 6 HOURS PRN
Qty: 120 TABLET | Refills: 0 | Status: SHIPPED | OUTPATIENT
Start: 2020-03-25 | End: 2020-04-23 | Stop reason: SDUPTHER

## 2020-03-25 NOTE — PROGRESS NOTES
PAIN MANAGEMENT FOLLOW-UP NOTE  3/25/20    CHIEF COMPLAINT: This is a58 y.o. male patientwho returns to the [facility] with a history of Neck Pain      PAIN Tia Parmar returns today for  reevaluation. Since the visit, the patient reports that the pain is not changed. R neck pain  States another surgery   Of neck planned     Location: Neck  Location Modifier: cervical R Severity of Pain: 7  Duration of Pain: Persistent  Frequency of Pain: Intermittent  Aggravating Factors: -  Limiting Behavior: Standing   Relieving Factors: narcotics        Previous pain medication trials have included:          Mental health:    Patient feels - secondary to their current pain problems as described above. H/O depression and anxiety: No   Patient is not seeing psychologist orpsychiatrist   Abuse history? No    Employed? No    ANALGESIA:   Are your Current Pain medication (s) helping to decrease pain? No.   Current Pain score: Pain Score:   8    ADVERSE AFFECTS:   Medication Side Effects: No.    ACTIVITY:  Are you able to be more active with your pain medications? No and       ABERRANT BEHAVIORS SINCE LAST VISIT? No    Review of Systems     Allergies   Allergen Reactions    Codeine Itching    Diclofenac Nausea Only     dizziness       Outpatient Medications Prior to Visit   Medication Sig Dispense Refill    traMADol (ULTRAM) 50 MG tablet Take 1 tablet by mouth every 6 hours as needed for Pain (Take 2 tablets by mouth every 6 hours as needed for pain for up to 30 days) for up to 30 days. 240 tablet 3    cyclobenzaprine (FLEXERIL) 10 MG tablet take 1 tablet by mouth three times a day if needed for muscle spasm 90 tablet 5    gabapentin (NEURONTIN) 600 MG tablet take 1 tablet by mouth three times a day 90 tablet 3    TAMSULOSIN HCL PO Take 0.5 mg by mouth      albuterol-ipratropium (COMBIVENT)  MCG/ACT inhaler Inhale 2 puffs into the lungs 4 times daily as needed for Wheezing.  1 Inhaler 3    ALPRAZolam Amanda Fujisawa) 0.5 MG tablet Take 0.5 mg by mouth nightly as needed for Sleep.  acetaminophen (TYLENOL) 500 MG tablet Take 500 mg by mouth every 6 hours as needed for Pain       No facility-administered medications prior to visit.         Past Medical History:   Diagnosis Date    Anxiety     Back pain     Cervical neuropathy     CHF (congestive heart failure) (HCC)     Depression     ETOH abuse     HTN (hypertension)     Hyperlipidemia     Impaired memory     Prostate disease     Sleep apnea     Thrombocytopenia (HCC)        Past Surgical History:   Procedure Laterality Date    BACK SURGERY      4 or 5 plates implanted    CARDIAC SURGERY      stents x 1    CERVICAL SPINE SURGERY N/A 4/26/2019    C7/T1 INTERLAMINAR (CPT 34665) performed by Baker Room, MD at [address] [facility],[address]  3/1/2014         FRACTURE SURGERY      left shoulder, right leg    NECK SURGERY      PROSTATE BIOPSY       Family History   Problem Relation Age of Onset    Cancer Father      Social History     Socioeconomic History    Marital status:      Spouse name: None    Number of children: None    Years of education: None    Highest education level: None   Occupational History    None   Social Needs    Financial resource strain: None    Food insecurity     Worry: None     Inability: None    Transportation needs     Medical: None     Non-medical: None   Tobacco Use    Smoking status: Current Every Day Smoker     Packs/day: 1.00     Years: 25.00     Pack years: 25.00    Smokeless tobacco: Never Used   Substance and Sexual Activity    Alcohol use: Not Currently     Comment: former daily    Drug use: No    Sexual activity: None   Lifestyle    Physical activity     Days per week: None     Minutes per session: None    Stress: None   Relationships    Social connections     Talks on phone: None     Gets together: None     Attends [employer] service: None     Active member of club or organization:

## 2020-03-25 NOTE — TELEPHONE ENCOUNTER
Pt stated that the Tramadol does help, but he still wants to take Tramadol 50 2 tab every 6 hours,     Please advise

## 2020-03-25 NOTE — TELEPHONE ENCOUNTER
Pt was seen in [facility] today and states he has been taking 2 tabs tramadol for a couple years now and today he picked up the prescription from pharmacy and it is for 1 tab every 6 hrs. Can this be changed?     [phone]

## 2020-03-29 LAB
6-ACETYLMORPHINE, UR: NORMAL
AMPHETAMINE SCREEN, URINE: NORMAL
BARBITURATE SCREEN, URINE: NORMAL
BENZODIAZEPINE SCREEN, URINE: NORMAL
CANNABINOID SCREEN URINE: NORMAL
COCAINE METABOLITE, URINE: NORMAL
CREATININE URINE: NORMAL
EDDP, URINE: NORMAL
ETHANOL URINE: NORMAL
MDMA URINE: NORMAL
METHADONE SCREEN, URINE: NORMAL
METHAMPHETAMINE, URINE: NORMAL
OPIATES, URINE: NORMAL
OXYCODONE: NORMAL
PCP: NORMAL
PH, URINE: NORMAL
PHENCYCLIDINE, URINE: NORMAL
PROPOXYPHENE, URINE: NORMAL
TRICYCLIC ANTIDEPRESSANTS, UR: NORMAL

## 2020-04-13 RX ORDER — GABAPENTIN 600 MG/1
600 TABLET ORAL 3 TIMES DAILY
Qty: 90 TABLET | Refills: 3 | OUTPATIENT
Start: 2020-04-13 | End: 2020-10-12

## 2020-04-13 NOTE — TELEPHONE ENCOUNTER
The patient requested a refill of gabapentin. Phoned in, please sign.      Last Appt:  4/15/2019  Next Appt:   Visit date not found  Med verified in Replaced by [facility] [address]

## 2020-04-23 RX ORDER — TRAMADOL HYDROCHLORIDE 50 MG/1
50 TABLET ORAL EVERY 6 HOURS PRN
Qty: 120 TABLET | Refills: 0 | Status: SHIPPED | OUTPATIENT
Start: 2020-04-24 | End: 2020-05-21 | Stop reason: SDUPTHER

## 2020-05-21 RX ORDER — TRAMADOL HYDROCHLORIDE 50 MG/1
50 TABLET ORAL EVERY 6 HOURS PRN
Qty: 120 TABLET | Refills: 2 | OUTPATIENT
Start: 2020-05-23 | End: 2020-07-08

## 2020-07-08 ENCOUNTER — OFFICE VISIT (OUTPATIENT)
Dept: PAIN MANAGEMENT | Age: 60
End: 2020-07-08
Payer: MEDICARE

## 2020-07-08 VITALS
DIASTOLIC BLOOD PRESSURE: 80 MMHG | WEIGHT: 190 LBS | BODY MASS INDEX: 24.38 KG/M2 | HEIGHT: 74 IN | SYSTOLIC BLOOD PRESSURE: 118 MMHG

## 2020-07-08 PROCEDURE — 99214 OFFICE O/P EST MOD 30 MIN: CPT | Performed by: PHYSICAL MEDICINE & REHABILITATION

## 2020-07-08 RX ORDER — NAPROXEN SODIUM 220 MG
220 TABLET ORAL 2 TIMES DAILY PRN
COMMUNITY
End: 2021-02-24

## 2020-07-08 ASSESSMENT — ENCOUNTER SYMPTOMS
NAUSEA: 0
EYES NEGATIVE: 1
BACK PAIN: 1
CONSTIPATION: 0
RESPIRATORY NEGATIVE: 1
ALLERGIC/IMMUNOLOGIC NEGATIVE: 1

## 2020-07-08 NOTE — PROGRESS NOTES
PAIN MANAGEMENT FOLLOW-UP NOTE  7/8/20    CHIEF COMPLAINT: This is a58 y.o. male patientwho returns to the [facility] with a history of Neck Pain (right side of neck. Tramadol not helping)      Donna Pulido returns today for  reevaluation. Since the visit, the patient reports that the pain is not changed. R hand and crampy  Every few days or when  Doing something using hands  Baseline R neck  Pain denies any radiating pains  Feels numb  R foot feels hot and numb     Location: Neck  Location Modifier: cervical  Severity of Pain: 6  Duration of Pain: Intermittent  Frequency of Pain: Intermittent  Aggravating Factors: -  Limiting Behavior: Standing   Relieving Factors: narcotics        Previous pain medication trials have included:          Mental health:    Patient feels - secondary to their current pain problems as described above. H/O depression and anxiety: No   Patient is not seeing psychologist orpsychiatrist   Abuse history? No    Employed? No    ANALGESIA:   Are your Current Pain medication (s) helping to decrease pain? No.   Current Pain score: Pain Score:   8    ADVERSE AFFECTS:   Medication Side Effects: No.    ACTIVITY:  Are you able to be more active with your pain medications? No      ABERRANT BEHAVIORS SINCE LAST VISIT? No    Review of Systems   Constitutional: Positive for fatigue. HENT: Negative. Eyes: Negative. Respiratory: Negative. Cardiovascular: Negative. Gastrointestinal: Negative for constipation and nausea. Endocrine: Negative. Genitourinary: Negative for difficulty urinating. Musculoskeletal: Positive for arthralgias, back pain and myalgias. Skin: Negative. Allergic/Immunologic: Negative. Neurological: Positive for weakness and numbness. Hematological: Negative. Psychiatric/Behavioral: Positive for sleep disturbance. All other systems reviewed and are negative.        Allergies   Allergen Reactions    Codeine Itching    Diclofenac Nausea Only     dizziness       Outpatient Medications Prior to Visit   Medication Sig Dispense Refill    naproxen sodium (ALEVE) 220 MG tablet Take 220 mg by mouth 2 times daily (with meals)      traMADol (ULTRAM) 50 MG tablet Take 1 tablet by mouth every 6 hours as needed for Pain for up to 30 days. 120 tablet 2    gabapentin (NEURONTIN) 600 MG tablet Take 1 tablet by mouth 3 times daily for 30 days. 90 tablet 3    cyclobenzaprine (FLEXERIL) 10 MG tablet take 1 tablet by mouth three times a day if needed for muscle spasm 90 tablet 5    TAMSULOSIN HCL PO Take 0.5 mg by mouth      albuterol-ipratropium (COMBIVENT)  MCG/ACT inhaler Inhale 2 puffs into the lungs 4 times daily as needed for Wheezing. 1 Inhaler 3    ALPRAZolam (XANAX) 0.5 MG tablet Take 0.5 mg by mouth nightly as needed for Sleep. No facility-administered medications prior to visit.         Past Medical History:   Diagnosis Date    Anxiety     Back pain     Cervical neuropathy     CHF (congestive heart failure) (HCC)     Depression     ETOH abuse     HTN (hypertension)     Hyperlipidemia     Impaired memory     Prostate disease     Sleep apnea     Thrombocytopenia (HCC)        Past Surgical History:   Procedure Laterality Date    BACK SURGERY      4 or 5 plates implanted    CARDIAC SURGERY      stents x 1    CERVICAL SPINE SURGERY N/A 4/26/2019    C7/T1 INTERLAMINAR (CPT 77888) performed by Abigail Simmons MD at [address] [place] [address],[address]  3/1/2014         FRACTURE SURGERY      left shoulder, right leg    NECK SURGERY      PROSTATE BIOPSY       Family History   Problem Relation Age of Onset    Cancer Father      Social History     Socioeconomic History    Marital status:      Spouse name: None    Number of children: None    Years of education: None    Highest education level: None   Occupational History    None   Social Needs    Financial resource strain: None    Food insecurity Worry: None     Inability: None    Transportation needs     Medical: None     Non-medical: None   Tobacco Use    Smoking status: Current Every Day Smoker     Packs/day: 1.00     Years: 25.00     Pack years: 25.00    Smokeless tobacco: Never Used   Substance and Sexual Activity    Alcohol use: Not Currently     Comment: former daily    Drug use: No    Sexual activity: None   Lifestyle    Physical activity     Days per week: None     Minutes per session: None    Stress: None   Relationships    Social connections     Talks on phone: None     Gets together: None     Attends [employer] service: None     Active member of club or organization: None     Attends meetings of clubs or organizations: None     Relationship status: None    Intimate partner violence     Fear of current or ex partner: None     Emotionally abused: None     Physically abused: None     Forced sexual activity: None   Other Topics Concern    None   Social History Narrative    None         Family and Social Historyreviewed in the electronic medical record. Imaging:Reviewed available imaging in our system with the patient. No results found. Objective:     Physical Exam:  Vitals:    07/08/20 1019   BP: 118/80   Site: Right Upper Arm   Position: Sitting   Cuff Size: Medium Adult   Weight: 190 lb (86.2 kg)   Height: 6' 2\" (1.88 m)     Pain Score:   8    Physical Exam  Constitutional:       Appearance: He is well-developed and normal weight. HENT:      Head: Normocephalic and atraumatic. Mouth/Throat:      Mouth: Mucous membranes are dry. Cardiovascular:      Rate and Rhythm: Normal rate. Pulses: Normal pulses. Comments: Warm extremities. Normal capillary refill. Pulmonary:      Effort: Pulmonary effort is normal.   Abdominal:      General: Bowel sounds are normal. There is no distension. Palpations: Abdomen is soft. Skin:     General: Skin is warm and dry.    Neurological:      Mental Status: He is alert and oriented to person, place, and time. Mental status is at baseline. Cranial Nerves: No cranial nerve deficit. Sensory: No sensory deficit. Motor: No atrophy or abnormal muscle tone. Deep Tendon Reflexes: Reflexes are normal and symmetric. Psychiatric:         Mood and Affect: Mood normal.         Speech: Speech normal.         Behavior: Behavior normal.       Back Exam     Tenderness   The patient is experiencing tenderness in the cervical.    Range of Motion   Extension: normal   Flexion: normal   Lateral bend right: normal   Lateral bend left: normal   Rotation right: normal   Rotation left: normal     Muscle Strength   Right Quadriceps:  5/5   Left Quadriceps:  5/5   Right Hamstrings:  5/5   Left Hamstrings:  5/5     Tests   Straight leg raise right: negative  Straight leg raise left: negative    Other   Toe walk: normal  Heel walk: normal  Sensation: normal  Gait: normal     Comments:  Neck 50 degrees R lateral rotation   ? spurlings R    R hand nl sensation      Right Hand Exam     Comments:  Full ROm   n                                     Assessment: This is a 63 y.o. male patient with:    Diagnosis:   Diagnosis Orders   1. Cervical radiculopathy     2. Cervical facet joint syndrome     3. Cervical post-laminectomy syndrome     4. Right hand pain         Medical Comorbidities:  As listed in the patient's past medical and surgical history    Functional Limitations:   Pain limits function and quality of life. Plan:   To go back to Dr. Zayra Cuellar or  They may send to [facility] but have already  Recommended surgery   Continue Tramadol 50 QID     Wanted more and  Left without scheduling If calls  Please document will decide then whether to see patient. Meds:   Controlled Substances Monitoring: Pt educated about the risks of taking opiates,including increased sedation, constipation, slowed breathing, tolerance, dependence,and addiction.       New Prescriptions    No medications on

## 2020-07-20 RX ORDER — CYCLOBENZAPRINE HCL 10 MG
TABLET ORAL
Qty: 90 TABLET | Refills: 5 | Status: SHIPPED | OUTPATIENT
Start: 2020-07-20 | End: 2021-01-05

## 2020-08-17 NOTE — TELEPHONE ENCOUNTER
Last Appt:  7/8/20 [facility]   Next Appt:   Visit date not found  Med verified in [address] checked for [place], and [place]: 7/21/20 tramadol Hcl 50mg #120. Due 8/20/20.

## 2020-08-19 RX ORDER — TRAMADOL HYDROCHLORIDE 50 MG/1
TABLET ORAL
Qty: 120 TABLET | Refills: 1 | Status: SHIPPED | OUTPATIENT
Start: 2020-08-20 | End: 2020-10-19

## 2020-10-09 NOTE — TELEPHONE ENCOUNTER
Last Appt:  7/8/2020  Next Appt:   Visit date not found  Med verified in [address] [place] [address] is requesting a refill for the patient

## 2020-10-12 RX ORDER — GABAPENTIN 600 MG/1
TABLET ORAL
Qty: 90 TABLET | Refills: 0 | Status: SHIPPED | OUTPATIENT
Start: 2020-10-12 | End: 2020-11-06

## 2020-10-19 RX ORDER — TRAMADOL HYDROCHLORIDE 50 MG/1
50 TABLET ORAL EVERY 6 HOURS PRN
Qty: 12 TABLET | Refills: 0 | Status: SHIPPED | OUTPATIENT
Start: 2020-10-19 | End: 2020-10-21 | Stop reason: SDUPTHER

## 2020-10-19 NOTE — TELEPHONE ENCOUNTER
Pt called his PCP to get additional tramadol. His PCP office called to clarify why we won't fill it.  Pt is aware of what was told to him and called his pcp looking for more medication

## 2020-10-19 NOTE — TELEPHONE ENCOUNTER
Last Appt:  07/08/20  Next Appt:   Visit date not found  Med verified in [address] checked for [place], and [place]: 09/18/20 Tramadol Hcl 50Mg #120. Due 10/18/20. Pt was called to scheduled another appointment, states that he will call us back he just woke up.   Writer did advise him that no further refills will be given until he is seen in the office

## 2020-10-19 NOTE — TELEPHONE ENCOUNTER
Do you still want to fill medication until seen on Wednesday, or just fill on Wednesday when he is seen, due to him calling PCP Problem: Pain:  Goal: Pain level will decrease  Description: Pain level will decrease  Outcome: Ongoing

## 2020-10-21 ENCOUNTER — OFFICE VISIT (OUTPATIENT)
Dept: PAIN MANAGEMENT | Age: 60
End: 2020-10-21
Payer: MEDICARE

## 2020-10-21 VITALS
BODY MASS INDEX: 24.38 KG/M2 | SYSTOLIC BLOOD PRESSURE: 100 MMHG | DIASTOLIC BLOOD PRESSURE: 64 MMHG | WEIGHT: 190 LBS | HEIGHT: 74 IN | HEART RATE: 90 BPM

## 2020-10-21 PROCEDURE — 99214 OFFICE O/P EST MOD 30 MIN: CPT | Performed by: NURSE PRACTITIONER

## 2020-10-21 RX ORDER — TRAMADOL HYDROCHLORIDE 50 MG/1
50 TABLET ORAL EVERY 6 HOURS PRN
Qty: 120 TABLET | Refills: 2 | Status: SHIPPED | OUTPATIENT
Start: 2020-10-21 | End: 2021-01-14

## 2020-10-21 ASSESSMENT — ENCOUNTER SYMPTOMS
CONSTIPATION: 0
SHORTNESS OF BREATH: 1

## 2020-10-21 NOTE — PROGRESS NOTES
Negative for constipation. Musculoskeletal: Positive for arthralgias, myalgias and neck pain. Skin: Negative. Neurological: Negative for weakness and numbness. Psychiatric/Behavioral: Negative for sleep disturbance. Objective:   Physical Exam  Vitals signs reviewed. Constitutional:       General: He is not in acute distress. Appearance: He is well-developed. He is not ill-appearing, toxic-appearing or diaphoretic. Interventions: He is not intubated. Nasal cannula in place. HENT:      Head: Normocephalic and atraumatic. Pulmonary:      Effort: Pulmonary effort is normal. No tachypnea, bradypnea, accessory muscle usage, prolonged expiration, respiratory distress or retractions. He is not intubated. Musculoskeletal:      Cervical back: He exhibits pain. Skin:     General: Skin is warm and dry. Nails: There is no clubbing. Neurological:      Mental Status: He is alert and oriented to person, place, and time. Cranial Nerves: No cranial nerve deficit. Psychiatric:         Speech: Speech normal.         Assessment:       Diagnosis Orders   1. Cervicalgia     2. Cervical facet joint syndrome  traMADol (ULTRAM) 50 MG tablet   3. DDD (degenerative disc disease), thoracic     4. Chronic thoracic back pain, unspecified back pain laterality     5. Cervical radiculitis           Plan:      Chronic pain diagnoses such as   1. Cervicalgia    2. Cervical facet joint syndrome    3. DDD (degenerative disc disease), thoracic    4. Chronic thoracic back pain, unspecified back pain laterality    5. Cervical radiculitis     controlled on current medication regime, wll continue current pain medications to improve quality of life and function. Random drug screen today for opiate medication compliance.   Controlled Substance Monitoring:    Acute and Chronic Pain Monitoring:   RX Monitoring 10/21/2020   Periodic Controlled Substance Monitoring No signs of potential drug abuse or diversion identified.;Obtaining appropriate analgesic effect of treatment. ;Random urine drug screen sent today.                Brandee Coyle, ADOLPH - CNP

## 2020-11-06 RX ORDER — GABAPENTIN 600 MG/1
300 TABLET ORAL 3 TIMES DAILY
Qty: 90 TABLET | Refills: 3 | Status: SHIPPED | OUTPATIENT
Start: 2020-11-06 | End: 2020-12-16 | Stop reason: SDUPTHER

## 2020-11-06 NOTE — TELEPHONE ENCOUNTER
Gabapentin requested.      Last Appt:  10/21/2020  Next Appt:   1/20/2021  Med verified in [address]

## 2020-12-16 ENCOUNTER — TELEPHONE (OUTPATIENT)
Dept: PAIN MANAGEMENT | Age: 60
End: 2020-12-16

## 2020-12-16 NOTE — TELEPHONE ENCOUNTER
The patient called and stated that he is out of his gabapentin but the pharmacy will not allow a refill because the direction state 0.5 tablet and he has been taking a full tablet. The patient stated that he was not aware if the directions had changed. Please sign new script for gabapentin 600mg 3 times per day if appropriate. WDL

## 2020-12-17 RX ORDER — GABAPENTIN 600 MG/1
600 TABLET ORAL 3 TIMES DAILY
Qty: 90 TABLET | Refills: 11 | Status: SHIPPED | OUTPATIENT
Start: 2020-12-17 | End: 2021-02-24

## 2021-01-06 RX ORDER — CYCLOBENZAPRINE HCL 10 MG
TABLET ORAL
Qty: 90 TABLET | Refills: 11 | Status: SHIPPED | OUTPATIENT
Start: 2021-01-06

## 2021-01-14 DIAGNOSIS — M47.812 CERVICAL FACET JOINT SYNDROME: ICD-10-CM

## 2021-01-14 NOTE — TELEPHONE ENCOUNTER
Pt called and is requesting an increase in his medication. Pt states that he thinks it would be very beneficial with one more pill per day. Writer expressed that we dont normally make medication changes without an appointment but would check with the provider. Last Appt:  10/21/2020  Next Appt:   Visit date not found  Med verified in Epic    Pt has not been seen since OCT needs to come in to discuss increase. However are you will to give him enough to get him til his appt on the 27th in [facility]? Medication has been pended for that      [place] Report checked for [place], and [place]: 12/17/20 Tramado Hcl 50mg #120. Due 01/17/21    .

## 2021-01-15 RX ORDER — TRAMADOL HYDROCHLORIDE 50 MG/1
TABLET ORAL
Qty: 44 TABLET | Refills: 0 | Status: SHIPPED | OUTPATIENT
Start: 2021-01-15 | End: 2021-01-27 | Stop reason: SDUPTHER

## 2021-01-15 NOTE — TELEPHONE ENCOUNTER
Per previous note \"Pt has not been seen since OCT needs to come in to discuss increase. However are you will to give him enough to get him til his appt on the 27th in [facility]?   Medication has been pended for that\"    Medication has been pended to get him to that date if you are okay with it

## 2021-01-27 ENCOUNTER — OFFICE VISIT (OUTPATIENT)
Dept: PAIN MANAGEMENT | Age: 61
End: 2021-01-27
Payer: MEDICARE

## 2021-01-27 VITALS
RESPIRATION RATE: 16 BRPM | SYSTOLIC BLOOD PRESSURE: 124 MMHG | WEIGHT: 190 LBS | BODY MASS INDEX: 24.38 KG/M2 | DIASTOLIC BLOOD PRESSURE: 80 MMHG | HEIGHT: 74 IN

## 2021-01-27 DIAGNOSIS — M96.1 CERVICAL POST-LAMINECTOMY SYNDROME: ICD-10-CM

## 2021-01-27 DIAGNOSIS — M54.12 CERVICAL RADICULOPATHY: Primary | ICD-10-CM

## 2021-01-27 DIAGNOSIS — M47.812 CERVICAL FACET JOINT SYNDROME: ICD-10-CM

## 2021-01-27 PROCEDURE — 99214 OFFICE O/P EST MOD 30 MIN: CPT | Performed by: NURSE PRACTITIONER

## 2021-01-27 RX ORDER — PREGABALIN 150 MG/1
150 CAPSULE ORAL 2 TIMES DAILY
Qty: 10 CAPSULE | Refills: 0 | Status: SHIPPED | OUTPATIENT
Start: 2021-01-27 | End: 2021-02-18

## 2021-01-27 RX ORDER — TRAMADOL HYDROCHLORIDE 50 MG/1
100 TABLET ORAL EVERY 6 HOURS PRN
Qty: 240 TABLET | Refills: 1 | Status: SHIPPED | OUTPATIENT
Start: 2021-01-27 | End: 2021-02-24

## 2021-01-27 ASSESSMENT — ENCOUNTER SYMPTOMS
SHORTNESS OF BREATH: 1
CONSTIPATION: 0

## 2021-01-27 NOTE — PROGRESS NOTES
Subjective:      Patient ID: Eneida Rosales is a 63 y.o. male. Chief Complaint   Patient presents with    Neck Pain     1 month follow up    Medication Management     Neck Pain   Pertinent negatives include no chest pain, numbness or weakness. Here today for routine pain clinic recheck. Neck pain worse, tramadol wearing off sooner, interfering with his sleep. Failed cervical procedures, states made his pain worse. Denies tramadol side effects. Gabapentin causes blurred vision. Pain Assessment  Location of Pain: Neck  Location Modifiers: Right  Severity of Pain: (5 but can increase will quick)  Quality of Pain: Sharp  Duration of Pain: Persistent  Frequency of Pain: Constant  Aggravating Factors: (turing, lifting pushing, pulling)  Limiting Behavior: Yes  Relieving Factors: Rest(moist heat)  Result of Injury: (previous neck surgery)    Allergies   Allergen Reactions    Codeine Itching    Diclofenac Nausea Only     dizziness       Outpatient Medications Marked as Taking for the 1/27/21 encounter (Office Visit) with ADOLPH Palma CNP   Medication Sig Dispense Refill    traMADol (ULTRAM) 50 MG tablet Take 2 tablets by mouth every 6 hours as needed for Pain for up to 30 days. 240 tablet 1    pregabalin (LYRICA) 150 MG capsule Take 1 capsule by mouth 2 times daily for 5 days. 10 capsule 0    cyclobenzaprine (FLEXERIL) 10 MG tablet take 1 tablet by mouth three times a day if needed for muscle spasm 90 tablet 11    naproxen sodium (ALEVE) 220 MG tablet Take 220 mg by mouth 2 times daily as needed       TAMSULOSIN HCL PO Take 0.5 mg by mouth nightly as needed       albuterol-ipratropium (COMBIVENT)  MCG/ACT inhaler Inhale 2 puffs into the lungs 4 times daily as needed for Wheezing. 1 Inhaler 3    ALPRAZolam (XANAX) 0.5 MG tablet Take 0.5 mg by mouth nightly as needed for Sleep.          Past Medical History:   Diagnosis Date    Anxiety     Back pain     Cervical neuropathy     CHF Constitutional: Positive for fatigue (recent hospitalization for pneumonia). Respiratory: Positive for shortness of breath (home O2). Cardiovascular: Negative for chest pain. Gastrointestinal: Negative for constipation. Musculoskeletal: Positive for arthralgias, myalgias and neck pain. Skin: Negative. Neurological: Negative for weakness and numbness. Psychiatric/Behavioral: Negative for sleep disturbance. Objective:   Physical Exam  Vitals signs reviewed. Constitutional:       General: He is not in acute distress. Appearance: He is well-developed. He is not ill-appearing, toxic-appearing or diaphoretic. Interventions: He is not intubated. Nasal cannula in place. HENT:      Head: Normocephalic and atraumatic. Pulmonary:      Effort: Pulmonary effort is normal. No tachypnea, bradypnea, accessory muscle usage, prolonged expiration, respiratory distress or retractions. He is not intubated. Musculoskeletal:      Cervical back: He exhibits pain. Skin:     General: Skin is warm and dry. Nails: There is no clubbing. Neurological:      Mental Status: He is alert and oriented to person, place, and time. Cranial Nerves: No cranial nerve deficit. Psychiatric:         Speech: Speech normal.         Assessment:       Diagnosis Orders   1. Cervical radiculopathy  pregabalin (LYRICA) 150 MG capsule   2. Cervical facet joint syndrome  traMADol (ULTRAM) 50 MG tablet    pregabalin (LYRICA) 150 MG capsule   3. Cervical post-laminectomy syndrome  pregabalin (LYRICA) 150 MG capsule         Plan:      Increase tramadol 100mg 4x's per day  Short Rx Lyrica given to try instead of gabapentin    Controlled Substance Monitoring:    Acute and Chronic Pain Monitoring:   RX Monitoring 1/27/2021   Periodic Controlled Substance Monitoring No signs of potential drug abuse or diversion identified.                Humble Balderas, APRN - CNP

## 2021-02-18 DIAGNOSIS — M47.812 CERVICAL FACET JOINT SYNDROME: ICD-10-CM

## 2021-02-18 DIAGNOSIS — M54.12 CERVICAL RADICULOPATHY: ICD-10-CM

## 2021-02-18 DIAGNOSIS — M96.1 CERVICAL POST-LAMINECTOMY SYNDROME: ICD-10-CM

## 2021-02-19 RX ORDER — PREGABALIN 150 MG/1
CAPSULE ORAL
Qty: 60 CAPSULE | Refills: 5 | Status: SHIPPED | OUTPATIENT
Start: 2021-02-19 | End: 2021-02-24

## 2021-02-24 ENCOUNTER — OFFICE VISIT (OUTPATIENT)
Dept: PAIN MANAGEMENT | Age: 61
End: 2021-02-24
Payer: MEDICARE

## 2021-02-24 VITALS
HEIGHT: 74 IN | RESPIRATION RATE: 14 BRPM | SYSTOLIC BLOOD PRESSURE: 122 MMHG | WEIGHT: 190 LBS | BODY MASS INDEX: 24.38 KG/M2 | DIASTOLIC BLOOD PRESSURE: 86 MMHG

## 2021-02-24 DIAGNOSIS — M47.812 CERVICAL FACET JOINT SYNDROME: ICD-10-CM

## 2021-02-24 DIAGNOSIS — M51.34 DDD (DEGENERATIVE DISC DISEASE), THORACIC: ICD-10-CM

## 2021-02-24 DIAGNOSIS — Z79.891 ENCOUNTER FOR LONG-TERM OPIATE ANALGESIC USE: ICD-10-CM

## 2021-02-24 DIAGNOSIS — M96.1 CERVICAL POST-LAMINECTOMY SYNDROME: ICD-10-CM

## 2021-02-24 DIAGNOSIS — M54.12 CERVICAL RADICULOPATHY: ICD-10-CM

## 2021-02-24 DIAGNOSIS — M54.2 CERVICALGIA: ICD-10-CM

## 2021-02-24 DIAGNOSIS — Z02.83 ENCOUNTER FOR DRUG SCREENING: Primary | ICD-10-CM

## 2021-02-24 PROCEDURE — 99214 OFFICE O/P EST MOD 30 MIN: CPT | Performed by: NURSE PRACTITIONER

## 2021-02-24 RX ORDER — ACETAMINOPHEN 500 MG
500 TABLET ORAL EVERY 6 HOURS PRN
COMMUNITY

## 2021-02-24 RX ORDER — ALPRAZOLAM 0.25 MG/1
0.25 TABLET ORAL DAILY
COMMUNITY
Start: 2021-02-15

## 2021-02-24 RX ORDER — HYDROCODONE BITARTRATE AND ACETAMINOPHEN 5; 325 MG/1; MG/1
1 TABLET ORAL EVERY 6 HOURS PRN
Qty: 56 TABLET | Refills: 0 | Status: SHIPPED | OUTPATIENT
Start: 2021-02-24 | End: 2021-03-10 | Stop reason: SDUPTHER

## 2021-02-24 RX ORDER — TAMSULOSIN HYDROCHLORIDE 0.4 MG/1
0.4 CAPSULE ORAL DAILY
COMMUNITY
Start: 2021-02-13

## 2021-02-24 SDOH — HEALTH STABILITY: MENTAL HEALTH: HOW OFTEN DO YOU HAVE A DRINK CONTAINING ALCOHOL?: 2-4 TIMES A MONTH

## 2021-02-24 ASSESSMENT — ENCOUNTER SYMPTOMS
CONSTIPATION: 0
SHORTNESS OF BREATH: 1

## 2021-02-24 NOTE — PROGRESS NOTES
Subjective:      Patient ID: Claudell Parkinson is a 57 y.o. male. Chief Complaint   Patient presents with    Neck Pain    Medication Management     Neck Pain   Pertinent negatives include no chest pain, numbness or weakness. Here today for routine pain clinic recheck. Neck pain worse, tramadol wearing off sooner, interfering with his sleep. Tramadol increases, makes him drowsy. Failed cervical procedures, states made his pain worse. Gabapentin causes blurred vision, but really works for his sciatica pain. Short Rx Lyrica, same thing. He has had Percocet in the past for postop pain with good pain relief. Pain Assessment  Location of Pain: Neck  Severity of Pain: 5  Quality of Pain: Aching, Dull, Sharp  Duration of Pain: Persistent  Frequency of Pain: Constant  Aggravating Factors: (moving up down, left right, holding neck up)  Limiting Behavior: Yes  Relieving Factors: (nothing)  Result of Injury: Yes(previous neck surgery)    Allergies   Allergen Reactions    Codeine Itching    Diclofenac Nausea Only     dizziness       Outpatient Medications Marked as Taking for the 2/24/21 encounter (Office Visit) with ADOLPH Sen CNP   Medication Sig Dispense Refill    tamsulosin (FLOMAX) 0.4 MG capsule Take 0.4 mg by mouth daily      ALPRAZolam (XANAX) 0.25 MG tablet Take 0.25 mg by mouth daily.  acetaminophen (TYLENOL) 500 MG tablet Take 500 mg by mouth every 6 hours as needed for Pain      HYDROcodone-acetaminophen (NORCO) 5-325 MG per tablet Take 1 tablet by mouth every 6 hours as needed for Pain for up to 14 days. Intended supply: 3 days. Take lowest dose possible to manage pain 56 tablet 0    traMADol (ULTRAM) 50 MG tablet Take 2 tablets by mouth every 6 hours as needed for Pain for up to 30 days.  240 tablet 1    cyclobenzaprine (FLEXERIL) 10 MG tablet take 1 tablet by mouth three times a day if needed for muscle spasm 90 tablet 11    albuterol-ipratropium (COMBIVENT)  MCG/ACT inhaler Inhale 2 puffs into the lungs 4 times daily as needed for Wheezing.  1 Inhaler 3       Past Medical History:   Diagnosis Date    Anxiety     Back pain     Cervical neuropathy     CHF (congestive heart failure) (HCC)     Depression     ETOH abuse     HTN (hypertension)     Hyperlipidemia     Impaired memory     Prostate disease     Sleep apnea     Thrombocytopenia (HCC)        Past Surgical History:   Procedure Laterality Date    BACK SURGERY      4 or 5 plates implanted    CARDIAC SURGERY      stents x 1    CERVICAL SPINE SURGERY N/A 4/26/2019    C7/T1 INTERLAMINAR (CPT 12631) performed by Maryana Mcmahon MD at [address] [place] [address],[address]  3/1/2014         FRACTURE SURGERY      left shoulder, right leg    NECK SURGERY      PROSTATE BIOPSY         Family History   Problem Relation Age of Onset    Cancer Father        Social History     Socioeconomic History    Marital status:      Spouse name: None    Number of children: None    Years of education: None    Highest education level: None   Occupational History    None   Social Needs    Financial resource strain: None    Food insecurity     Worry: None     Inability: None    Transportation needs     Medical: None     Non-medical: None   Tobacco Use    Smoking status: Current Every Day Smoker     Packs/day: 0.50     Years: 25.00     Pack years: 12.50    Smokeless tobacco: Never Used   Substance and Sexual Activity    Alcohol use: Yes     Frequency: 2-4 times a month     Comment: will not say    Drug use: No    Sexual activity: None   Lifestyle    Physical activity     Days per week: None     Minutes per session: None    Stress: None   Relationships    Social connections     Talks on phone: None     Gets together: None     Attends [employer] service: None     Active member of club or organization: None     Attends meetings of clubs or organizations: None     Relationship status: None    Intimate partner violence Fear of current or ex partner: None     Emotionally abused: None     Physically abused: None     Forced sexual activity: None   Other Topics Concern    None   Social History Narrative    None     Review of Systems   Constitutional: Positive for fatigue (recent hospitalization for pneumonia). Respiratory: Positive for shortness of breath (home O2). Cardiovascular: Negative for chest pain. Gastrointestinal: Negative for constipation. Musculoskeletal: Positive for arthralgias, myalgias and neck pain. Skin: Negative. Neurological: Negative for weakness and numbness. Psychiatric/Behavioral: Negative for sleep disturbance. Objective:   Physical Exam  Vitals signs reviewed. Constitutional:       General: He is not in acute distress. Appearance: He is well-developed. He is not ill-appearing, toxic-appearing or diaphoretic. Interventions: He is not intubated. Nasal cannula in place. HENT:      Head: Normocephalic and atraumatic. Pulmonary:      Effort: Pulmonary effort is normal. No tachypnea, bradypnea, accessory muscle usage, prolonged expiration, respiratory distress or retractions. He is not intubated. Musculoskeletal:      Cervical back: He exhibits pain. Skin:     General: Skin is warm and dry. Nails: There is no clubbing. Neurological:      Mental Status: He is alert and oriented to person, place, and time. Cranial Nerves: No cranial nerve deficit. Psychiatric:         Speech: Speech normal.         Assessment:       Diagnosis Orders   1. Cervical facet joint syndrome  HYDROcodone-acetaminophen (NORCO) 5-325 MG per tablet   2. Cervical radiculopathy  HYDROcodone-acetaminophen (NORCO) 5-325 MG per tablet   3. Cervical post-laminectomy syndrome  HYDROcodone-acetaminophen (NORCO) 5-325 MG per tablet   4. Cervicalgia  HYDROcodone-acetaminophen (NORCO) 5-325 MG per tablet   5.  DDD (degenerative disc disease), thoracic  HYDROcodone-acetaminophen (NORCO) 5-325 MG per tablet         Plan:      Consider gralise  Stop tramadol, start Norco 5, consider Percocet    Controlled Substance Monitoring:    Acute and Chronic Pain Monitoring:   RX Monitoring 2/24/2021   Periodic Controlled Substance Monitoring No signs of potential drug abuse or diversion identified.                Kyle Paulson, APRN - CNP

## 2021-03-09 DIAGNOSIS — Z02.83 ENCOUNTER FOR DRUG SCREENING: ICD-10-CM

## 2021-03-09 DIAGNOSIS — Z79.891 ENCOUNTER FOR LONG-TERM OPIATE ANALGESIC USE: ICD-10-CM

## 2021-03-10 ENCOUNTER — OFFICE VISIT (OUTPATIENT)
Dept: PAIN MANAGEMENT | Age: 61
End: 2021-03-10
Payer: MEDICARE

## 2021-03-10 VITALS
DIASTOLIC BLOOD PRESSURE: 102 MMHG | WEIGHT: 190 LBS | HEART RATE: 108 BPM | SYSTOLIC BLOOD PRESSURE: 156 MMHG | BODY MASS INDEX: 24.38 KG/M2 | HEIGHT: 74 IN

## 2021-03-10 DIAGNOSIS — M54.12 CERVICAL RADICULITIS: Primary | ICD-10-CM

## 2021-03-10 DIAGNOSIS — M54.2 CERVICALGIA: ICD-10-CM

## 2021-03-10 DIAGNOSIS — M54.12 CERVICAL RADICULOPATHY: ICD-10-CM

## 2021-03-10 DIAGNOSIS — M96.1 CERVICAL POST-LAMINECTOMY SYNDROME: ICD-10-CM

## 2021-03-10 DIAGNOSIS — M47.812 CERVICAL FACET JOINT SYNDROME: ICD-10-CM

## 2021-03-10 DIAGNOSIS — M51.34 DDD (DEGENERATIVE DISC DISEASE), THORACIC: ICD-10-CM

## 2021-03-10 PROCEDURE — 99214 OFFICE O/P EST MOD 30 MIN: CPT | Performed by: NURSE PRACTITIONER

## 2021-03-10 RX ORDER — HYDROCODONE BITARTRATE AND ACETAMINOPHEN 5; 325 MG/1; MG/1
1 TABLET ORAL EVERY 6 HOURS PRN
Qty: 120 TABLET | Refills: 0 | Status: SHIPPED | OUTPATIENT
Start: 2021-03-10 | End: 2021-04-07 | Stop reason: SDUPTHER

## 2021-03-10 RX ORDER — GABAPENTIN 600 MG/1
600 TABLET ORAL 3 TIMES DAILY
COMMUNITY
End: 2021-05-05

## 2021-03-10 ASSESSMENT — ENCOUNTER SYMPTOMS
CONSTIPATION: 0
SHORTNESS OF BREATH: 1

## 2021-03-10 NOTE — PROGRESS NOTES
tablet Take 500 mg by mouth every 6 hours as needed for Pain      cyclobenzaprine (FLEXERIL) 10 MG tablet take 1 tablet by mouth three times a day if needed for muscle spasm 90 tablet 11    albuterol-ipratropium (COMBIVENT)  MCG/ACT inhaler Inhale 2 puffs into the lungs 4 times daily as needed for Wheezing.  1 Inhaler 3       Past Medical History:   Diagnosis Date    Anxiety     Back pain     Cervical neuropathy     CHF (congestive heart failure) (HCC)     Depression     ETOH abuse     HTN (hypertension)     Hyperlipidemia     Impaired memory     Prostate disease     Sleep apnea     Thrombocytopenia (HCC)        Past Surgical History:   Procedure Laterality Date    BACK SURGERY      4 or 5 plates implanted    CARDIAC SURGERY      stents x 1    CERVICAL SPINE SURGERY N/A 4/26/2019    C7/T1 INTERLAMINAR (CPT 73150) performed by Angel Luis Whitley MD at [address] [place] [address],[address]  3/1/2014         FRACTURE SURGERY      left shoulder, right leg    NECK SURGERY      PROSTATE BIOPSY         Family History   Problem Relation Age of Onset    Cancer Father        Social History     Socioeconomic History    Marital status:      Spouse name: None    Number of children: None    Years of education: None    Highest education level: None   Occupational History    None   Social Needs    Financial resource strain: None    Food insecurity     Worry: None     Inability: None    Transportation needs     Medical: None     Non-medical: None   Tobacco Use    Smoking status: Current Every Day Smoker     Packs/day: 0.50     Years: 25.00     Pack years: 12.50    Smokeless tobacco: Never Used   Substance and Sexual Activity    Alcohol use: Yes     Frequency: 2-4 times a month     Comment: will not say    Drug use: No    Sexual activity: None   Lifestyle    Physical activity     Days per week: None     Minutes per session: None    Stress: None   Relationships    Social connections Talks on phone: None     Gets together: None     Attends Sikhism service: None     Active member of club or organization: None     Attends meetings of clubs or organizations: None     Relationship status: None    Intimate partner violence     Fear of current or ex partner: None     Emotionally abused: None     Physically abused: None     Forced sexual activity: None   Other Topics Concern    None   Social History Narrative    None     Review of Systems   Constitutional: Positive for fatigue (recent hospitalization for pneumonia). Respiratory: Positive for shortness of breath (home O2). Cardiovascular: Negative for chest pain. Gastrointestinal: Negative for constipation. Musculoskeletal: Positive for arthralgias, myalgias and neck pain. Skin: Negative. Neurological: Negative for weakness and numbness. Psychiatric/Behavioral: Negative for sleep disturbance. Objective:   Physical Exam  Vitals signs reviewed. Constitutional:       General: He is not in acute distress. Appearance: He is well-developed. He is not ill-appearing, toxic-appearing or diaphoretic. Interventions: He is not intubated. Nasal cannula in place. HENT:      Head: Normocephalic and atraumatic. Pulmonary:      Effort: Pulmonary effort is normal. No tachypnea, bradypnea, accessory muscle usage, prolonged expiration, respiratory distress or retractions. He is not intubated. Musculoskeletal:      Cervical back: He exhibits pain. Skin:     General: Skin is warm and dry. Nails: There is no clubbing. Neurological:      Mental Status: He is alert and oriented to person, place, and time. Cranial Nerves: No cranial nerve deficit. Psychiatric:         Speech: Speech normal.         Assessment:       Diagnosis Orders   1. Cervical radiculitis     2. Cervical facet joint syndrome  HYDROcodone-acetaminophen (NORCO) 5-325 MG per tablet   3.  Cervical radiculopathy  HYDROcodone-acetaminophen (NORCO) 5-325 MG per tablet   4. Cervical post-laminectomy syndrome  HYDROcodone-acetaminophen (NORCO) 5-325 MG per tablet   5. Cervicalgia  HYDROcodone-acetaminophen (NORCO) 5-325 MG per tablet   6. DDD (degenerative disc disease), thoracic  HYDROcodone-acetaminophen (NORCO) 5-325 MG per tablet         Plan:    Continue Norco, new Rx sent  Pursue eye doctor appt as planned for vision changes  Change gabapentin IR to Gralise to alleviate side effects  Melatonin for sleep  Controlled Substance Monitoring:    Acute and Chronic Pain Monitoring:   RX Monitoring 2/24/2021   Periodic Controlled Substance Monitoring No signs of potential drug abuse or diversion identified.                Mike Kan, APRN - CNP

## 2021-04-07 ENCOUNTER — OFFICE VISIT (OUTPATIENT)
Dept: PAIN MANAGEMENT | Age: 61
End: 2021-04-07
Payer: MEDICARE

## 2021-04-07 VITALS
BODY MASS INDEX: 22.84 KG/M2 | HEIGHT: 74 IN | SYSTOLIC BLOOD PRESSURE: 124 MMHG | WEIGHT: 178 LBS | DIASTOLIC BLOOD PRESSURE: 80 MMHG

## 2021-04-07 DIAGNOSIS — M54.2 CERVICALGIA: ICD-10-CM

## 2021-04-07 DIAGNOSIS — M47.812 CERVICAL FACET JOINT SYNDROME: ICD-10-CM

## 2021-04-07 DIAGNOSIS — M96.1 CERVICAL POST-LAMINECTOMY SYNDROME: ICD-10-CM

## 2021-04-07 DIAGNOSIS — M54.12 CERVICAL RADICULOPATHY: ICD-10-CM

## 2021-04-07 DIAGNOSIS — M51.34 DDD (DEGENERATIVE DISC DISEASE), THORACIC: ICD-10-CM

## 2021-04-07 PROCEDURE — 99214 OFFICE O/P EST MOD 30 MIN: CPT | Performed by: NURSE PRACTITIONER

## 2021-04-07 RX ORDER — TAPENTADOL HYDROCHLORIDE 50 MG/1
50 TABLET, FILM COATED ORAL 3 TIMES DAILY PRN
Qty: 15 TABLET | Refills: 0 | Status: SHIPPED | OUTPATIENT
Start: 2021-04-07 | End: 2021-04-12

## 2021-04-07 RX ORDER — HYDROCODONE BITARTRATE AND ACETAMINOPHEN 5; 325 MG/1; MG/1
1 TABLET ORAL EVERY 6 HOURS PRN
Qty: 120 TABLET | Refills: 0 | Status: SHIPPED | OUTPATIENT
Start: 2021-04-09 | End: 2021-05-05 | Stop reason: SDUPTHER

## 2021-04-07 ASSESSMENT — ENCOUNTER SYMPTOMS
SHORTNESS OF BREATH: 1
CONSTIPATION: 0

## 2021-04-07 NOTE — PROGRESS NOTES
puffs into the lungs 4 times daily as needed for Wheezing.  1 Inhaler 3       Past Medical History:   Diagnosis Date    Anxiety     Back pain     Cervical neuropathy     CHF (congestive heart failure) (HCC)     Depression     ETOH abuse     HTN (hypertension)     Hyperlipidemia     Impaired memory     Prostate disease     Sleep apnea     Thrombocytopenia (HCC)        Past Surgical History:   Procedure Laterality Date    BACK SURGERY      4 or 5 plates implanted    CARDIAC SURGERY      stents x 1    CERVICAL SPINE SURGERY N/A 4/26/2019    C7/T1 INTERLAMINAR (CPT 90068) performed by Sarah Erazo MD at [address] [place] [address],[address]  3/1/2014         FRACTURE SURGERY      left shoulder, right leg    NECK SURGERY      PROSTATE BIOPSY         Family History   Problem Relation Age of Onset    Cancer Father        Social History     Socioeconomic History    Marital status:      Spouse name: None    Number of children: None    Years of education: None    Highest education level: None   Occupational History    None   Social Needs    Financial resource strain: None    Food insecurity     Worry: None     Inability: None    Transportation needs     Medical: None     Non-medical: None   Tobacco Use    Smoking status: Current Every Day Smoker     Packs/day: 0.50     Years: 25.00     Pack years: 12.50    Smokeless tobacco: Never Used   Substance and Sexual Activity    Alcohol use: Yes     Frequency: 2-4 times a month     Comment: will not say    Drug use: No    Sexual activity: None   Lifestyle    Physical activity     Days per week: None     Minutes per session: None    Stress: None   Relationships    Social connections     Talks on phone: None     Gets together: None     Attends Yazidism service: None     Active member of club or organization: None     Attends meetings of clubs or organizations: None     Relationship status: None    Intimate partner violence     Fear of current or ex partner: None     Emotionally abused: None     Physically abused: None     Forced sexual activity: None   Other Topics Concern    None   Social History Narrative    None     Review of Systems   Constitutional: Positive for fatigue (recent hospitalization for pneumonia). Respiratory: Positive for shortness of breath (home O2). Cardiovascular: Negative for chest pain. Gastrointestinal: Negative for constipation. Musculoskeletal: Positive for arthralgias, myalgias and neck pain. Skin: Negative. Neurological: Negative for weakness and numbness. Psychiatric/Behavioral: Negative for sleep disturbance. Objective:   Physical Exam  Vitals signs reviewed. Constitutional:       General: He is not in acute distress. Appearance: He is well-developed. He is not ill-appearing, toxic-appearing or diaphoretic. Interventions: He is not intubated. Nasal cannula in place. HENT:      Head: Normocephalic and atraumatic. Pulmonary:      Effort: Pulmonary effort is normal. No tachypnea, bradypnea, accessory muscle usage, prolonged expiration, respiratory distress or retractions. He is not intubated. Musculoskeletal:      Cervical back: He exhibits pain. Skin:     General: Skin is warm and dry. Nails: There is no clubbing. Neurological:      Mental Status: He is alert and oriented to person, place, and time. Cranial Nerves: No cranial nerve deficit. Psychiatric:         Speech: Speech normal.         Assessment:       Diagnosis Orders   1. Cervical facet joint syndrome  tapentadol (NUCYNTA) 50 MG TABS    HYDROcodone-acetaminophen (NORCO) 5-325 MG per tablet   2. Cervical radiculopathy  tapentadol (NUCYNTA) 50 MG TABS    HYDROcodone-acetaminophen (NORCO) 5-325 MG per tablet   3. Cervical post-laminectomy syndrome  tapentadol (NUCYNTA) 50 MG TABS    HYDROcodone-acetaminophen (NORCO) 5-325 MG per tablet   4.  Cervicalgia  tapentadol (NUCYNTA) 50 MG TABS HYDROcodone-acetaminophen (NORCO) 5-325 MG per tablet   5. DDD (degenerative disc disease), thoracic  tapentadol (NUCYNTA) 50 MG TABS    HYDROcodone-acetaminophen (NORCO) 5-325 MG per tablet         Plan:       Chronic pain diagnoses such as   1. Cervical facet joint syndrome    2. Cervical radiculopathy    3. Cervical post-laminectomy syndrome    4. Cervicalgia    5. DDD (degenerative disc disease), thoracic     controlled on current medication regime, wll continue current pain medications to improve quality of life and function. #15 tablets Nucynta prescribed to try to take the place of gabapentin since it causes blurred vision   Monitoring:    Acute and Chronic Pain Monitoring:   RX Monitoring 4/7/2021   Periodic Controlled Substance Monitoring No signs of potential drug abuse or diversion identified.;Obtaining appropriate analgesic effect of treatment.                Arminda Lopze, APRN - CNP

## 2021-04-22 DIAGNOSIS — M54.12 CERVICAL RADICULOPATHY: Primary | ICD-10-CM

## 2021-04-22 RX ORDER — TAPENTADOL HYDROCHLORIDE 50 MG/1
TABLET, FILM COATED ORAL
Qty: 90 TABLET | Refills: 0 | Status: SHIPPED | OUTPATIENT
Start: 2021-04-22 | End: 2021-05-05

## 2021-04-22 NOTE — TELEPHONE ENCOUNTER
Last Appt:  4/7/2021  Next Appt:   5/5/2021  Med verified in [address] checked for [place], [place], and [place]: 04/07/21 Nucynta 50mg #15 this was only given for 5 days in replace of the gabapentin d/t blurred vision. Pharmacy is requesting this medication, spoke with patient he would like to continue for a little while longer to see if this helps. States that it is much better than the gabapentin, but he isn't sure just yet of the full affect. Please fill in qty you want him to have.   Pt is coming in for follow up 5.5

## 2021-05-05 ENCOUNTER — OFFICE VISIT (OUTPATIENT)
Dept: PAIN MANAGEMENT | Age: 61
End: 2021-05-05
Payer: MEDICARE

## 2021-05-05 VITALS
RESPIRATION RATE: 14 BRPM | BODY MASS INDEX: 24.38 KG/M2 | SYSTOLIC BLOOD PRESSURE: 138 MMHG | DIASTOLIC BLOOD PRESSURE: 80 MMHG | WEIGHT: 190 LBS | HEIGHT: 74 IN

## 2021-05-05 DIAGNOSIS — M96.1 CERVICAL POST-LAMINECTOMY SYNDROME: ICD-10-CM

## 2021-05-05 DIAGNOSIS — M54.12 CERVICAL RADICULOPATHY: ICD-10-CM

## 2021-05-05 DIAGNOSIS — M51.34 DDD (DEGENERATIVE DISC DISEASE), THORACIC: ICD-10-CM

## 2021-05-05 DIAGNOSIS — M54.2 CERVICALGIA: ICD-10-CM

## 2021-05-05 DIAGNOSIS — M47.812 CERVICAL FACET JOINT SYNDROME: ICD-10-CM

## 2021-05-05 PROCEDURE — 99214 OFFICE O/P EST MOD 30 MIN: CPT | Performed by: NURSE PRACTITIONER

## 2021-05-05 RX ORDER — HYDROCODONE BITARTRATE AND ACETAMINOPHEN 5; 325 MG/1; MG/1
1 TABLET ORAL EVERY 6 HOURS PRN
Qty: 120 TABLET | Refills: 0 | Status: SHIPPED | OUTPATIENT
Start: 2021-05-09 | End: 2021-06-02 | Stop reason: SDUPTHER

## 2021-05-05 ASSESSMENT — ENCOUNTER SYMPTOMS
SHORTNESS OF BREATH: 1
CONSTIPATION: 0
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
## 2021-05-12 ENCOUNTER — TELEPHONE (OUTPATIENT)
Dept: PAIN MANAGEMENT | Age: 61
End: 2021-05-12

## 2021-05-12 NOTE — TELEPHONE ENCOUNTER
Patient called and left a VM on 5.7 that was pulled off machine today. Stated that he has a prescription question.   Writer called [phone] as requested however VM not set up, unable to leave a voicemail

## 2021-06-02 ENCOUNTER — OFFICE VISIT (OUTPATIENT)
Dept: PAIN MANAGEMENT | Age: 61
End: 2021-06-02
Payer: MEDICARE

## 2021-06-02 VITALS
WEIGHT: 190 LBS | DIASTOLIC BLOOD PRESSURE: 80 MMHG | HEIGHT: 74 IN | SYSTOLIC BLOOD PRESSURE: 122 MMHG | BODY MASS INDEX: 24.38 KG/M2

## 2021-06-02 DIAGNOSIS — M54.2 CERVICALGIA: ICD-10-CM

## 2021-06-02 DIAGNOSIS — M54.12 CERVICAL RADICULOPATHY: ICD-10-CM

## 2021-06-02 DIAGNOSIS — M96.1 CERVICAL POST-LAMINECTOMY SYNDROME: ICD-10-CM

## 2021-06-02 DIAGNOSIS — M51.34 DDD (DEGENERATIVE DISC DISEASE), THORACIC: ICD-10-CM

## 2021-06-02 DIAGNOSIS — M47.812 CERVICAL FACET JOINT SYNDROME: ICD-10-CM

## 2021-06-02 DIAGNOSIS — M54.16 LUMBAR RADICULOPATHY: Primary | ICD-10-CM

## 2021-06-02 PROCEDURE — 99214 OFFICE O/P EST MOD 30 MIN: CPT | Performed by: NURSE PRACTITIONER

## 2021-06-02 RX ORDER — PREGABALIN 150 MG/1
150 CAPSULE ORAL 2 TIMES DAILY
COMMUNITY

## 2021-06-02 RX ORDER — HYDROCODONE BITARTRATE AND ACETAMINOPHEN 5; 325 MG/1; MG/1
1 TABLET ORAL EVERY 6 HOURS PRN
Qty: 120 TABLET | Refills: 0 | Status: SHIPPED | OUTPATIENT
Start: 2021-06-08 | End: 2021-07-14 | Stop reason: SDUPTHER

## 2021-06-02 ASSESSMENT — ENCOUNTER SYMPTOMS
SHORTNESS OF BREATH: 1
CONSTIPATION: 0

## 2021-06-02 NOTE — PROGRESS NOTES
Subjective:      Patient ID: Gracy Baker is a 57 y.o. male. Chief Complaint   Patient presents with    Neck Pain     Neck Pain   Pertinent negatives include no chest pain, numbness or weakness. Here today for routine pain clinic recheck. Huger with good pain relief, satisfied with Norco but does not alleviate nerve pain, no side effects. Also trial Nucynta 75mg at bedtime for nerve pain, but kept him awake. Failed cervical procedures, states made his pain worse. Gabapentin causes blurred vision, but really works for his sciatica pain. Short Rx Lyrica, same thing. He has had Percocet in the past for postop pain with good pain relief    Pain Assessment  Location of Pain: Neck  Severity of Pain: 5  Quality of Pain: Aching  Duration of Pain: Persistent  Frequency of Pain: Constant  Aggravating Factors: Bending, Stretching, Straightening (holding head up)  Limiting Behavior: Yes  Relieving Factors: Rest  Are there other pain locations you wish to document?: No    Allergies   Allergen Reactions    Codeine Itching    Diclofenac Nausea Only     dizziness       Outpatient Medications Marked as Taking for the 6/2/21 encounter (Office Visit) with ADOLPH Horvath - CNP   Medication Sig Dispense Refill    pregabalin (LYRICA) 150 MG capsule Take 150 mg by mouth 2 times daily.  [START ON 6/8/2021] HYDROcodone-acetaminophen (NORCO) 5-325 MG per tablet Take 1 tablet by mouth every 6 hours as needed for Pain for up to 30 days. 120 tablet 0    tamsulosin (FLOMAX) 0.4 MG capsule Take 0.4 mg by mouth daily      ALPRAZolam (XANAX) 0.25 MG tablet Take 0.25 mg by mouth daily.       acetaminophen (TYLENOL) 500 MG tablet Take 500 mg by mouth every 6 hours as needed for Pain      cyclobenzaprine (FLEXERIL) 10 MG tablet take 1 tablet by mouth three times a day if needed for muscle spasm 90 tablet 11    albuterol-ipratropium (COMBIVENT)  MCG/ACT inhaler Inhale 2 puffs into the lungs 4 times daily as needed for Wheezing. 1 Inhaler 3       Past Medical History:   Diagnosis Date    Anxiety     Back pain     Cervical neuropathy     CHF (congestive heart failure) (HCC)     Depression     ETOH abuse     HTN (hypertension)     Hyperlipidemia     Impaired memory     Prostate disease     Sleep apnea     Thrombocytopenia (HCC)        Past Surgical History:   Procedure Laterality Date    BACK SURGERY      4 or 5 plates implanted    CARDIAC SURGERY      stents x 1    CERVICAL SPINE SURGERY N/A 4/26/2019    C7/T1 INTERLAMINAR (CPT 31615) performed by Yesika Ugarte MD at [address] [place] [address],[address]  3/1/2014         FRACTURE SURGERY      left shoulder, right leg    NECK SURGERY      PROSTATE BIOPSY         Family History   Problem Relation Age of Onset    Cancer Father        Social History     Socioeconomic History    Marital status:      Spouse name: None    Number of children: None    Years of education: None    Highest education level: None   Occupational History    None   Tobacco Use    Smoking status: Current Every Day Smoker     Packs/day: 0.50     Years: 25.00     Pack years: 12.50    Smokeless tobacco: Never Used   Substance and Sexual Activity    Alcohol use: Yes     Comment: will not say    Drug use: No    Sexual activity: None   Other Topics Concern    None   Social History Narrative    None     Social Determinants of Health     Financial Resource Strain:     Difficulty of Paying Living Expenses:    Food Insecurity:     Worried About Running Out of Food in the Last Year:     Ran Out of Food in the Last Year:    Transportation Needs:     Lack of Transportation (Medical):      Lack of Transportation (Non-Medical):    Physical Activity:     Days of Exercise per Week:     Minutes of Exercise per Session:    Stress:     Feeling of Stress :    Social Connections:     Frequency of Communication with Friends and Family:     Frequency of Social Gatherings with Friends and Family:     Attends Buddhist Services:     Active Member of Clubs or Organizations:     Attends Club or Organization Meetings:     Marital Status:    Intimate Partner Violence:     Fear of Current or Ex-Partner:     Emotionally Abused:     Physically Abused:     Sexually Abused:      Review of Systems   Constitutional: Positive for fatigue (recent hospitalization for pneumonia). Respiratory: Positive for shortness of breath (home O2). Cardiovascular: Negative for chest pain. Gastrointestinal: Negative for constipation. Musculoskeletal: Positive for arthralgias, myalgias and neck pain. Skin: Negative. Neurological: Negative for weakness and numbness. Psychiatric/Behavioral: Negative for sleep disturbance. Objective:   Physical Exam  Vitals reviewed. Constitutional:       General: He is not in acute distress. Appearance: He is well-developed. He is not ill-appearing, toxic-appearing or diaphoretic. Interventions: He is not intubated. Nasal cannula in place. HENT:      Head: Normocephalic and atraumatic. Pulmonary:      Effort: Pulmonary effort is normal. No tachypnea, bradypnea, accessory muscle usage, prolonged expiration, respiratory distress or retractions. He is not intubated. Skin:     General: Skin is warm and dry. Nails: There is no clubbing. Neurological:      Mental Status: He is alert and oriented to person, place, and time. Cranial Nerves: No cranial nerve deficit. Psychiatric:         Speech: Speech normal.         Assessment:       Diagnosis Orders   1. Lumbar radiculopathy     2. Cervical facet joint syndrome  HYDROcodone-acetaminophen (NORCO) 5-325 MG per tablet   3. Cervical radiculopathy  HYDROcodone-acetaminophen (NORCO) 5-325 MG per tablet   4. Cervical post-laminectomy syndrome  HYDROcodone-acetaminophen (NORCO) 5-325 MG per tablet   5. Cervicalgia  HYDROcodone-acetaminophen (NORCO) 5-325 MG per tablet   6.  DDD (degenerative disc disease), thoracic  HYDROcodone-acetaminophen (NORCO) 5-325 MG per tablet         Plan:       He is going to try Nucynta throughout the day, Norco prn. May also attempt restarting Lyrica. Will continue to follow monthly until on satisfactory regime. Monitoring:    Acute and Chronic Pain Monitoring:   RX Monitoring 6/3/2021   Periodic Controlled Substance Monitoring No signs of potential drug abuse or diversion identified.                Otoniel Rangel, APRN - CNP

## 2021-07-14 ENCOUNTER — OFFICE VISIT (OUTPATIENT)
Dept: PAIN MANAGEMENT | Age: 61
End: 2021-07-14
Payer: MEDICARE

## 2021-07-14 VITALS
WEIGHT: 190 LBS | RESPIRATION RATE: 14 BRPM | BODY MASS INDEX: 24.38 KG/M2 | HEIGHT: 74 IN | DIASTOLIC BLOOD PRESSURE: 88 MMHG | SYSTOLIC BLOOD PRESSURE: 132 MMHG

## 2021-07-14 DIAGNOSIS — F17.210 CIGARETTE NICOTINE DEPENDENCE WITHOUT COMPLICATION: ICD-10-CM

## 2021-07-14 DIAGNOSIS — Z71.6 ENCOUNTER FOR SMOKING CESSATION COUNSELING: ICD-10-CM

## 2021-07-14 DIAGNOSIS — M54.12 CERVICAL RADICULOPATHY: ICD-10-CM

## 2021-07-14 DIAGNOSIS — Z79.891 ENCOUNTER FOR LONG-TERM OPIATE ANALGESIC USE: ICD-10-CM

## 2021-07-14 DIAGNOSIS — M96.1 CERVICAL POST-LAMINECTOMY SYNDROME: ICD-10-CM

## 2021-07-14 DIAGNOSIS — M51.34 DDD (DEGENERATIVE DISC DISEASE), THORACIC: ICD-10-CM

## 2021-07-14 DIAGNOSIS — Z02.83 ENCOUNTER FOR DRUG SCREENING: Primary | ICD-10-CM

## 2021-07-14 DIAGNOSIS — M47.812 CERVICAL FACET JOINT SYNDROME: ICD-10-CM

## 2021-07-14 DIAGNOSIS — M54.2 CERVICALGIA: ICD-10-CM

## 2021-07-14 PROCEDURE — 99214 OFFICE O/P EST MOD 30 MIN: CPT | Performed by: NURSE PRACTITIONER

## 2021-07-14 RX ORDER — NICOTINE 21 MG/24HR
1 PATCH, TRANSDERMAL 24 HOURS TRANSDERMAL DAILY
Qty: 42 PATCH | Refills: 0 | Status: SHIPPED | OUTPATIENT
Start: 2021-07-14 | End: 2021-08-25

## 2021-07-14 RX ORDER — HYDROCODONE BITARTRATE AND ACETAMINOPHEN 5; 325 MG/1; MG/1
1 TABLET ORAL EVERY 8 HOURS PRN
Qty: 90 TABLET | Refills: 0 | Status: SHIPPED | OUTPATIENT
Start: 2021-07-14 | End: 2021-08-13

## 2021-07-14 ASSESSMENT — ENCOUNTER SYMPTOMS
SHORTNESS OF BREATH: 1
BACK PAIN: 1
CONSTIPATION: 0

## 2021-07-14 NOTE — PROGRESS NOTES
Subjective:      Patient ID: Andrew Woodard is a 57 y.o. male. Chief Complaint   Patient presents with    Back Pain    Neck Pain    Medication Management     Neck Pain   Pertinent negatives include no chest pain, numbness or weakness. Back Pain  Pertinent negatives include no chest pain, numbness or weakness. Here today for routine pain clinic recheck, he is trying to quit smoking. Norco with good pain relief and is able to be more active, improves quality of life, satisfied with Norco but does not alleviate nerve pain, no side effects. Failed cervical procedures, states made his pain worse. Gabapentin causes blurred vision, but really works for his sciatica pain. Same with Lyrica, but will take prn for sciatica. Nucynta 75mg at bedtime for nerve pain, but kept him awake. He has had Percocet in the past for postop pain with good pain relief    Pain Assessment  Location of Pain: Back  Severity of Pain: 5  Quality of Pain: Aching  Duration of Pain: Persistent  Frequency of Pain: Constant  Aggravating Factors: Bending, Stretching, Straightening, Exercise, Kneeling, Squatting, Standing, Walking, Stairs  Limiting Behavior: Yes  Relieving Factors: Rest, Heat (soft cast)    Allergies   Allergen Reactions    Codeine Itching    Diclofenac Nausea Only     dizziness       Outpatient Medications Marked as Taking for the 7/14/21 encounter (Office Visit) with ADOLPH Baldwin CNP   Medication Sig Dispense Refill    nicotine (NICODERM CQ) 21 MG/24HR Place 1 patch onto the skin daily 42 patch 0    pregabalin (LYRICA) 150 MG capsule Take 150 mg by mouth 2 times daily.  HYDROcodone-acetaminophen (NORCO) 5-325 MG per tablet Take 1 tablet by mouth every 6 hours as needed for Pain for up to 30 days. 120 tablet 0    tamsulosin (FLOMAX) 0.4 MG capsule Take 0.4 mg by mouth daily      ALPRAZolam (XANAX) 0.25 MG tablet Take 0.25 mg by mouth daily.       acetaminophen (TYLENOL) 500 MG tablet Take 500 mg by mouth every 6 hours as needed for Pain      cyclobenzaprine (FLEXERIL) 10 MG tablet take 1 tablet by mouth three times a day if needed for muscle spasm 90 tablet 11    albuterol-ipratropium (COMBIVENT)  MCG/ACT inhaler Inhale 2 puffs into the lungs 4 times daily as needed for Wheezing.  1 Inhaler 3       Past Medical History:   Diagnosis Date    Anxiety     Back pain     Cervical neuropathy     CHF (congestive heart failure) (HCC)     Depression     ETOH abuse     HTN (hypertension)     Hyperlipidemia     Impaired memory     Prostate disease     Sleep apnea     Thrombocytopenia (HCC)        Past Surgical History:   Procedure Laterality Date    BACK SURGERY      4 or 5 plates implanted    CARDIAC SURGERY      stents x 1    CERVICAL SPINE SURGERY N/A 4/26/2019    C7/T1 INTERLAMINAR (CPT 01036) performed by Randa Mendoza MD at [address] [place] [address],[address]  3/1/2014         FRACTURE SURGERY      left shoulder, right leg    NECK SURGERY      PROSTATE BIOPSY         Family History   Problem Relation Age of Onset    Cancer Father        Social History     Socioeconomic History    Marital status:      Spouse name: None    Number of children: None    Years of education: None    Highest education level: None   Occupational History    None   Tobacco Use    Smoking status: Current Every Day Smoker     Packs/day: 0.50     Years: 25.00     Pack years: 12.50    Smokeless tobacco: Never Used    Tobacco comment: trying to quit   Substance and Sexual Activity    Alcohol use: Yes     Comment: wine coolers    Drug use: No    Sexual activity: None   Other Topics Concern    None   Social History Narrative    None     Social Determinants of Health     Financial Resource Strain:     Difficulty of Paying Living Expenses:    Food Insecurity:     Worried About Running Out of Food in the Last Year:     Staci of Food in the Last Year:    Transportation Needs:     Lack of Transportation (Medical):  Lack of Transportation (Non-Medical):    Physical Activity:     Days of Exercise per Week:     Minutes of Exercise per Session:    Stress:     Feeling of Stress :    Social Connections:     Frequency of Communication with Friends and Family:     Frequency of Social Gatherings with Friends and Family:     Attends Jew Services:     Active Member of Clubs or Organizations:     Attends Club or Organization Meetings:     Marital Status:    Intimate Partner Violence:     Fear of Current or Ex-Partner:     Emotionally Abused:     Physically Abused:     Sexually Abused:      Review of Systems   Constitutional: Positive for fatigue (recent hospitalization for pneumonia). Respiratory: Positive for shortness of breath (home O2). Cardiovascular: Negative for chest pain. Gastrointestinal: Negative for constipation. Musculoskeletal: Positive for arthralgias, back pain, myalgias and neck pain. Skin: Negative. Neurological: Negative for weakness and numbness. Psychiatric/Behavioral: Negative for sleep disturbance. Objective:   Physical Exam  Vitals reviewed. Constitutional:       General: He is not in acute distress. Appearance: He is well-developed. He is not ill-appearing, toxic-appearing or diaphoretic. Interventions: He is not intubated. Nasal cannula in place. HENT:      Head: Normocephalic and atraumatic. Pulmonary:      Effort: Pulmonary effort is normal. No tachypnea, bradypnea, accessory muscle usage, prolonged expiration, respiratory distress or retractions. He is not intubated. Skin:     General: Skin is warm and dry. Nails: There is no clubbing. Neurological:      Mental Status: He is alert and oriented to person, place, and time. Cranial Nerves: No cranial nerve deficit. Psychiatric:         Speech: Speech normal.         Assessment:       Diagnosis Orders   1. Cervical facet joint syndrome     2. Cervicalgia     3. DDD (degenerative disc disease), thoracic     4. Encounter for smoking cessation counseling  nicotine (NICODERM CQ) 21 MG/24HR   5. Cigarette nicotine dependence without complication  nicotine (NICODERM CQ) 21 MG/24HR         Plan:       Chronic pain diagnoses such as   1. Cervical facet joint syndrome    2. Cervicalgia    3. DDD (degenerative disc disease), thoracic    4. Encounter for smoking cessation counseling    5. Cigarette nicotine dependence without complication     controlled on current medication regime, wll continue current pain medications to improve quality of life and function. Nicotine patch for smoking cessation     Monitoring:    Acute and Chronic Pain Monitoring:   RX Monitoring 7/14/2021   Periodic Controlled Substance Monitoring No signs of potential drug abuse or diversion identified.              Tara Morales, APRN - CNP

## 2021-07-20 DIAGNOSIS — Z02.83 ENCOUNTER FOR DRUG SCREENING: ICD-10-CM

## 2021-07-20 DIAGNOSIS — Z79.891 ENCOUNTER FOR LONG-TERM OPIATE ANALGESIC USE: ICD-10-CM

## 2021-08-12 ENCOUNTER — TELEPHONE (OUTPATIENT)
Dept: PAIN MANAGEMENT | Age: 61
End: 2021-08-12

## 2021-08-12 NOTE — TELEPHONE ENCOUNTER
Patient called and asked if provider would revise her decision of termination. He stated he doesn't know what else to do and current treatment was the only thing helping him live a normal life.

## 2021-11-19 DIAGNOSIS — M47.812 CERVICAL FACET JOINT SYNDROME: ICD-10-CM

## 2021-11-19 RX ORDER — TRAMADOL HYDROCHLORIDE 50 MG/1
TABLET ORAL
Qty: 240 TABLET | OUTPATIENT
Start: 2021-11-19

## 2023-12-15 NOTE — H&P
Signed        Expand All Collapse All         Show:Clear all  [x]Manual[x]Template[]Copied    Added by:  [x]ADOLPH Larsen CNP      []Katheryn for details      Subjective:      Patient ID: Dee Ramon is a 60 y.o. male. Chief Complaint   Patient presents with    Neck Pain       Neck pain radiates into upper back    Other       The patient states that he has had 3 neck surgeries by Dr. Beverly Molina Other       The patient did over 2 months of PT and chiropractic care in the past.       HPI Initial new patient consult. Chronic neck pain, radiates right arm, tingling. He has underwent three cervical surgeries, surgeon is telling him he may need a 1th. Has never underwent cervical epidurals. Back (thoracic) pain worse than neck. He has underwent physical therapy, provided short term relief. He has trialed Percocet, oxycontin (improved his sleep), methadone in the past with relief. Denies overtaking of opiates. Failed OTC NSAIDs, reflux. Muscle relaxers in the past have helped, does not recall which ones. Tramadol prn without significant relief, requesting alternate opiate      Pain Assessment  Location of Pain: Neck  Location Modifiers: (pain in neck and in between shoulder blades. Headaches)  Severity of Pain: 8  Quality of Pain: Sharp(stiff)  Duration of Pain: Persistent  Frequency of Pain: Constant  Aggravating Factors: Bending(pressure, driving)  Limiting Behavior: Yes  Relieving Factors: (muscle relaxers.  Tramadol)  Are there other pain locations you wish to document?: No           Allergies   Allergen Reactions    Codeine Itching    Diclofenac Nausea Only       dizziness         Active Medications          Outpatient Medications Marked as Taking for the 4/15/19 encounter (Office Visit) with ADOLPH Crowe CNP   Medication Sig Dispense Refill    TAMSULOSIN HCL PO Take 0.5 mg by mouth        Probiotic Product (PROBIOTIC PO) Take by mouth        acetaminophen (TYLENOL) 500 MG tablet Take 500 mg by mouth every 6 hours as needed for Pain        gabapentin (NEURONTIN) 400 MG capsule Take 400 mg by mouth 3 times daily. Dorcus Merle traMADol (ULTRAM) 50 MG tablet Take 50 mg by mouth every 6 hours as needed for Pain. Dorcus Merle metoprolol (LOPRESSOR) 50 MG tablet Take 1 tablet by mouth 2 times daily. 60 tablet 1    albuterol-ipratropium (COMBIVENT)  MCG/ACT inhaler Inhale 2 puffs into the lungs 4 times daily as needed for Wheezing. 1 Inhaler 3    sucralfate (CARAFATE) 1 GM/10ML suspension Take 1 g by mouth 4 times daily (before meals and nightly).  ALPRAZolam (XANAX) 0.5 MG tablet Take 0.5 mg by mouth nightly as needed for Sleep.                 Past Medical History        Past Medical History:   Diagnosis Date    Anxiety      Back pain      Cervical neuropathy      CHF (congestive heart failure) (HCC)      Depression      ETOH abuse      HTN (hypertension)      Hyperlipidemia      Impaired memory      Prostate disease      Sleep apnea      Thrombocytopenia (HCC)              Past Surgical History         Past Surgical History:   Procedure Laterality Date    BACK SURGERY         4 or 5 plates implanted    CARDIAC SURGERY         stents x 1    COLONOSCOPY        FEMORAL LINE   3/1/2014          FRACTURE SURGERY         left shoulder, right leg    NECK SURGERY        PROSTATE BIOPSY                Family History         Family History   Problem Relation Age of Onset    Cancer Father              Social History   Social History            Socioeconomic History    Marital status:        Spouse name: None    Number of children: None    Years of education: None    Highest education level: None   Occupational History    None   Social Needs    Financial resource strain: None    Food insecurity:       Worry: None       Inability: None    Transportation needs:       Medical: None       Non-medical: None   Tobacco Use    Smoking status: Current Every Day Smoker       Packs/day: 1.00       Years: 25.00       Pack years: 25.00    Smokeless tobacco: Never Used   Substance and Sexual Activity    Alcohol use: Yes       Comment: 4-5 wine coolers daily, former beer and liquor drinker    Drug use: No    Sexual activity: None   Lifestyle    Physical activity:       Days per week: None       Minutes per session: None    Stress: None   Relationships    Social connections:       Talks on phone: None       Gets together: None       Attends Jewish service: None       Active member of club or organization: None       Attends meetings of clubs or organizations: None       Relationship status: None    Intimate partner violence:       Fear of current or ex partner: None       Emotionally abused: None       Physically abused: None       Forced sexual activity: None   Other Topics Concern    None   Social History Narrative    None         Review of Systems   Endocrine: Positive for cold intolerance. Musculoskeletal: Positive for arthralgias, back pain and neck pain. Skin: Negative. Neurological: Positive for numbness (right foot) and headaches. Psychiatric/Behavioral: Positive for sleep disturbance. Objective:   Physical Exam   Constitutional: He is oriented to person, place, and time. He appears well-developed and well-nourished. No distress. He is not intubated. HENT:   Head: Normocephalic and atraumatic. Pulmonary/Chest: Effort normal. No accessory muscle usage. No apnea, no tachypnea and no bradypnea. He is not intubated. No respiratory distress. Musculoskeletal:        Cervical back: He exhibits pain. Thoracic back: He exhibits pain (upper thoracic). Back:    Neurological: He is alert and oriented to person, place, and time. No cranial nerve deficit. Skin: Skin is warm, dry and intact. Capillary refill takes less than 2 seconds. He is not diaphoretic. No cyanosis. No pallor. Nails show no clubbing. Psychiatric: He has a normal mood and affect. His speech is normal and behavior is normal. Judgment normal. He is not agitated, not aggressive, not withdrawn and not combative. He does not express impulsivity or inappropriate judgment. Vitals reviewed. Assessment:   1. Cervical facet joint syndrome    2. Cervicalgia    3. DDD (degenerative disc disease), thoracic    4. Chronic thoracic back pain, unspecified back pain laterality                     Plan: Will attempt to get further records in regards to history overdose 2014, he denies. States he quit drinking, has not drank in 15-20 years. Will change gabapentin 600mg, add flexeril 10mg tid prn. Thoracic MRI reviewed, will schedule thoracic interlaminar, to be scheduled [facility].                     Fan Tapia, APRN - CNP 0

## 2024-04-12 ENCOUNTER — HOSPITAL ENCOUNTER (OUTPATIENT)
Age: 64
Setting detail: THERAPIES SERIES
Discharge: HOME | End: 2024-04-12

## 2024-04-12 NOTE — FLOWSHEET NOTE
[facility] Outpatient Physical Therapy              [address] [place] [address]              Phone: [phone]              Fax: [phone]    Physical Therapy Cancel/No Show note    Date: 2024  Patient: Leland Eduardo II  : 1960  MRN: [id number]      Cancels/No Shows to date:     For today's appointment patient:    [x]  Cancelled    [] Rescheduled appointment    [] No-show     Reason given by patient:    [x]  Patient ill    []  Conflicting appointment    [] No transportation      [] Conflict with work    [] No reason given    [] Weather related    [] COVID-19    [] Other:      Comments:        [x] Next appointment was confirmed    Electronically signed by: Iain Chacon PT

## 2024-04-19 ENCOUNTER — HOSPITAL ENCOUNTER (OUTPATIENT)
Age: 64
Setting detail: THERAPIES SERIES
Discharge: HOME | End: 2024-04-19

## 2024-04-22 ENCOUNTER — HOSPITAL ENCOUNTER (OUTPATIENT)
Age: 64
Setting detail: THERAPIES SERIES
Discharge: HOME | End: 2024-04-22

## 2024-04-22 NOTE — FLOWSHEET NOTE
[facility] Outpatient Physical Therapy              [address] [place] [address]              Phone: [phone]              Fax: [phone]    Physical Therapy Cancel/No Show note    Date: 2024  Patient: Leland Eduardo II  : 1960  MRN: [id number]      Cancels/No Shows to date:     For today's appointment patient:    [x]  Cancelled    [] Rescheduled appointment    [] No-show     Reason given by patient:    [x]  Patient ill    []  Conflicting appointment    [] No transportation      [] Conflict with work    [] No reason given    [] Weather related    [] COVID-19    [] Other:      Comments:        [x] Next appointment was confirmed    Electronically signed by: Iain Chacon PT

## 2024-05-02 ENCOUNTER — HOSPITAL ENCOUNTER (OUTPATIENT)
Age: 64
Setting detail: THERAPIES SERIES
Discharge: HOME | End: 2024-05-02
Payer: MEDICARE

## 2024-05-02 PROCEDURE — G0283 ELEC STIM OTHER THAN WOUND: HCPCS

## 2024-05-02 PROCEDURE — 97161 PT EVAL LOW COMPLEX 20 MIN: CPT

## 2024-05-02 NOTE — FLOWSHEET NOTE
[facility] Fall Risk Assessment    Patient Name:  Leland Eduardo II  : 1960    Risk Factor Scale  Score   History of Falls [] Yes  [x] No 25  0 0   Secondary Diagnosis [] Yes  [x] No 15  0 0   Ambulatory Aid [] Furniture  [] Crutches/cane/walker  [x] None/bedrest/wheelchair/nurse 30  15  0 0   IV/Heparin Lock [] Yes  [x] No 20  0 0   Gait/Transferring [] Impaired  [] Weak  [x] Normal/bedrest/immobile 20  10  0 0   Mental Status [] Forgets limitations  [x] Oriented to own ability 15  0 0      Total:0     Based on the Assessment score: check the appropriate box.    [x]  No intervention needed   Low =   Score of 0-24    []  Use standard prevention interventions Moderate =  Score of 24-44   [] Give patient handout and discuss fall prevention strategies   [] Establish goal of education for patient/family RE: fall prevention strategies    []  Use high risk prevention interventions High = Score of 45 and higher   [] Give patient handout and discuss fall prevention strategies   [] Establish goal of education for patient/family Re: fall prevention strategies   [] Discuss lifeline / other resources    Electronically signed by:   Iain Chacon PT  Date: 2024

## 2024-05-02 NOTE — CONSULTS
[x]  Retired   [] Not employed   [] Disability  [] Other:  []  Return to work:   Work activities/duties        ADL/IADL Previous level of function Current level of function Who currently assists the patient with task   Bathing  [x] Independent  [] Assist [x] Independent  [] Assist painful   Dress/grooming [x] Independent  [] Assist [x] Independent  [] Assist painful   Transfer/mobility [x] Independent  [] Assist [x] Independent  [] Assist    Feeding [x] Independent  [] Assist [x] Independent  [] Assist    Toileting [x] Independent  [] Assist [x] Independent  [] Assist    Driving [x] Independent  [] Assist [x] Independent  [] Assist Neck pain   Housekeeping [x] Independent  [] Assist [x] Independent  [] Assist Pain doing dishes/looking down   Grocery shop/meal prep [x] Independent  [] Assist [x] Independent  [] Assist Occasional pain lifting     Gait Prior level of function Current level of function    [x] Independent  [] Assist [x] Independent  [] Assist   Device: [x] Independent [x] Independent    [] Straight Cane [] Quad cane [] Straight Cane [] Quad cane    [] Standard walker [] Rolling walker   [] 4 wheeled walker [] Standard walker [] Rolling walker   [] 4 wheeled walker    [] Wheelchair [] Wheelchair     Pain:  [x] Yes  [] No Location: post neck Pain Rating: (0-10 scale) 5-6/10  Pain altered Tx:  [] Yes  [x] No  Action:    Symptoms:  [] Improving [] Worsening [x] Same  Better:  [] AM    [] PM    [] Sit    [] Rise/Sit    []Stand    [] Walk    [] Lying    [] Other:  Worse: [x] AM    [] PM    [x] Sit    [] Rise/Sit    []Stand    [] Walk    [] Lying    [] Bend                      [] Valsalva    [x] Other: mowing, doing dishes  Sleep: [] OK    [x] Disturbed    Objective:      STRENGTH  STRENGTH  ROM    Left Right  Left Right     C5 Shld Abd          Shld Flexion          Shld IR 4-/5 4-/5         Shld ER 4-/5 4-/5         C6 Elb Flex 4-/5 4-/5        C7 Elb Ext 4-/5 4-/5        C8 EPL 3+/5 3+/5        T1 Fing

## 2024-05-08 ENCOUNTER — HOSPITAL ENCOUNTER (OUTPATIENT)
Age: 64
Setting detail: THERAPIES SERIES
Discharge: HOME | End: 2024-05-08
Payer: MEDICARE

## 2024-05-08 PROCEDURE — G0283 ELEC STIM OTHER THAN WOUND: HCPCS

## 2024-05-08 PROCEDURE — 97140 MANUAL THERAPY 1/> REGIONS: CPT

## 2024-05-08 NOTE — FLOWSHEET NOTE
[x] [facility]   Outpatient Rehabilitation & Therapy  [address] [facility] [facility] [address]  P: [phone]  F: [phone]    Physical Therapy Daily Treatment Note      Date:  2024  Patient Name:  Leland Eduardo II    :  1960  MRN: [id number]  Physician: Newton Dan md                              Insurance: [facility] [employer] retiree, BMN - No Prior Auth Required  Medical Diagnosis: cervical spondylosis                  Rehab Codes: M54.2, M54.6, R 20.2  Onset Date: 5 years ago                   Next 's appt.: TBD  Visit# / total visits:   Cancels/No Shows: 20    Subjective:  24   Pain:  [x] Yes  [] No Location: neck and upper traps Pain Rating: (0-10 scale) 6-7/10  Pain altered Tx:  [x] No  [] Yes  Action:    Comments: pt states he has a lot of stress and he has been trying to do yard work which has been aggravating his pain; he reports pain wakes him a lot during the night    Objective:  Modalities: Empi TENS, 2 pads each, bilat post cervical and upper thoracic paraspinals w/ hot pack to neck in hook lying  Precautions: 3 cervical surgeries  Exercises:  Exercise Reps/ Time Weight/ Level Comments    cupping B levator scap muscle bellys 3 mins ea        myofascial release/ soft tissue mobes 15 mins       Gentle manual cervical unloading 8 mins           Seated thoracic ext over chair 5 x  Pt noted a decrease in scapular pain w/ this                                             Other:HEP as below:    Access Code: [email or web address]  URL: [email or web address]  Date: 2024  Prepared by: Iain Chacon    Exercises  - Seated Thoracic Lumbar Extension with Pectoralis Stretch  - 3-4 x daily - 7 x weekly - 1 sets - 5 reps     Specific Instructions for next treatment: soft tissue mobes of neck and attempt ROM of neck as appropriate; pt was informed that his doctor ordered dry needling but that his insurance does not cover this modality and he is to let us know if he would like to pay out of

## 2024-05-14 ENCOUNTER — HOSPITAL ENCOUNTER (OUTPATIENT)
Age: 64
Setting detail: THERAPIES SERIES
Discharge: HOME | End: 2024-05-14
Payer: MEDICARE

## 2024-05-14 PROCEDURE — G0283 ELEC STIM OTHER THAN WOUND: HCPCS

## 2024-05-14 PROCEDURE — 97140 MANUAL THERAPY 1/> REGIONS: CPT

## 2024-05-14 NOTE — FLOWSHEET NOTE
[x] [facility]   Outpatient Rehabilitation & Therapy  [address] [facility] [address]  P: [phone]  F: [phone]    Physical Therapy Daily Treatment Note      Date:  2024  Patient Name:  Leland Eduardo II    :  1960  MRN: [id number]  Physician: Newton Dan md                              Insurance: [facility] [employer] retiree, BMN - No Prior Auth Required  Medical Diagnosis: cervical spondylosis                  Rehab Codes: M54.2, M54.6, R 20.2  Onset Date: 5 years ago                   Next 's appt.: TBD  Visit# / total visits: 3/20  Cancels/No Shows: 2/0    Subjective:  24   Pain:  [x] Yes  [] No Location: neck and upper traps Pain Rating: (0-10 scale) 5-6/10 currently, pain up to 10/10 this past weekend.   Pain altered Tx:  [x] No  [] Yes  Action:    Comments: Patient reports he did yard work over the weekend- on  he had guests over, had to put on his neck brace just to be able to sit and visit for a bit due to intense pain as well as N/T into z[facility] [employer].  He notes he is unsure if yard work or stress keeps causing neck pain to worsen. Patient presents today with significant;y forward flexed cervical posture.     Objective:  Modalities: Empi TENS, 2 pads each, bilat post cervical and upper thoracic paraspinals w/ hot pack to neck in hook lying  Precautions: 3 cervical surgeries  Exercises:  Exercise Reps/ Time Weight/ Level Comments    cupping B levator scap muscle bellys 3 mins ea NP        myofascial release/ soft tissue mobes 15 mins       Gentle manual cervical unloading 8 mins           Seated thoracic ext over chair 5 x  With manual assist                                             Other:HEP as below:    Access Code: [email or web address]  URL: [email or web address]  Date: 2024  Prepared by: Iain Chacon    Exercises  - Seated Thoracic Lumbar Extension with Pectoralis Stretch  - 3-4 x daily - 7 x weekly - 1 sets - 5 reps     Specific Instructions for next treatment: No

## 2024-05-21 ENCOUNTER — APPOINTMENT (OUTPATIENT)
Age: 64
End: 2024-05-21
Payer: MEDICARE

## 2024-05-24 ENCOUNTER — HOSPITAL ENCOUNTER (OUTPATIENT)
Age: 64
Setting detail: THERAPIES SERIES
Discharge: HOME | End: 2024-05-24
Payer: MEDICARE

## 2024-05-24 PROCEDURE — 97140 MANUAL THERAPY 1/> REGIONS: CPT

## 2024-05-24 NOTE — FLOWSHEET NOTE
allow for improved ease of driving        3. Pt to note neck pain waking less frequently     LTG: (to be met in 20 treatments)  Improve NDI score from 60% impaired to less than 20% impaired  Pt able to drive at least 30 mins w/o neck pain limiting him  Pt able to do dishes w/o neck pain limiting him  Pt to note at least 50% decrease in neck pain w/ driving        Patient goals:pain relief    Pt. Education:  [x] Yes  [] No  [] Reviewed Prior HEP/Ed  Method of Education: [x] Verbal  [] Demo  [] Written  Comprehension of Education:  [x] Verbalizes understanding.  [x] Demonstrates understanding.  [] Needs review.  [] Demonstrates/verbalizes HEP/Ed previously given.     Plan: [] Continue per plan of care.   [] Other:      Treatment Charges: Mins Units   []  Modalities UES (TENS)     [x]  Ther Exercise 2    [x]  Manual Therapy 28 2   []  Ther Activities     []  Aquatics     []  Neuromuscular     [] Vasocompression     [] Gait Training     [] Dry needling        [] 1 or 2 muscles        [] 3 or more muscles     []  Other     Total BILLABLE  time 30` 2     Time In:10:15 am            Time Out: 10:50 am  Electronically signed by:  Iain Chacon, PT

## 2024-06-04 ENCOUNTER — HOSPITAL ENCOUNTER (OUTPATIENT)
Age: 64
Setting detail: THERAPIES SERIES
Discharge: HOME | End: 2024-06-04
Payer: MEDICARE

## 2024-06-04 PROCEDURE — 97140 MANUAL THERAPY 1/> REGIONS: CPT

## 2024-06-04 PROCEDURE — G0283 ELEC STIM OTHER THAN WOUND: HCPCS

## 2024-06-04 NOTE — FLOWSHEET NOTE
[x] [facility]   Outpatient Rehabilitation & Therapy  [address]  P: [phone]  F: [phone]    Physical Therapy Daily Treatment Note      Date:  2024  Patient Name:  Leland Eduardo II    :  1960  MRN: [id number]  Physician: Newton Dan md                              Insurance: [facility] [employer] retiree, BMN - No Prior Auth Required  Medical Diagnosis: cervical spondylosis                  Rehab Codes: M54.2, M54.6, R 20.2  Onset Date: 5 years ago                   Next 's appt.: TBD  Visit# / total visits:   Cancels/No Shows: 2/0    Subjective:  24   Pain:  [x] Yes  [] No Location: post neck Pain Rating: (0-10 scale) 7/10 currently  Pain altered Tx:  [x] No  [] Yes  Action:    Comments: pt reports continued stiffness in his neck 7/10 pain. Reports he also pulled his LB since his last appointment     Objective:  Modalities: Empi TENS, 2 pads each, bilat post cervical and upper thoracic paraspinals w/ hot pack to neck in hook lying   Precautions: 3 cervical surgeries  Exercises:  Exercise Reps/ Time Weight/ Level Comments    cupping B upper trap trigger points 3 mins ea         myofascial release/ soft tissue mobes 15 mins       Gentle manual cervical unloading 8 mins           Seated thoracic ext over chair 5 x  With manual assist   Gentle manual cervical rotation 5 x zach            hot pack neck / LB+TENS 10 mins                           Other:     Specific Instructions for next treatment:     Assessment: [x] Progressing toward goals. 24  pt continues to carry his head in very forward position. Good tolerance to STM and cupping with minimal discomfort and continued tightness noted in B upper traps. He noted his neck pain decreased to 4-5/10 after heat and TENS today.      [] No change.     [] Other:    [x] Patient would continue to benefit from skilled physical therapy services in order to: improve driving and sleeping tolerance    GOALS:     STG:

## 2024-06-10 ENCOUNTER — HOSPITAL ENCOUNTER (OUTPATIENT)
Age: 64
Setting detail: THERAPIES SERIES
Discharge: HOME | End: 2024-06-10
Payer: MEDICARE

## 2024-06-10 NOTE — FLOWSHEET NOTE
[facility] Outpatient Physical Therapy              [address] [place] [address]              Phone: [phone]              Fax: [phone]    Physical Therapy Cancel/No Show note    Date: 6/10/2024  Patient: Leland Eduardo II  : 1960  MRN: [id number]      Cancels/No Shows to date:     For today's appointment patient:    [x]  Cancelled    [] Rescheduled appointment    [] No-show     Reason given by patient:    []  Patient ill    []  Conflicting appointment    [] No transportation      [] Conflict with work    [x] No reason given    [] Weather related    [] COVID-19    [] Other:      Comments:        [x] Next appointment was confirmed    Electronically signed by: Iain Chacon PT

## 2024-06-12 ENCOUNTER — APPOINTMENT (OUTPATIENT)
Age: 64
End: 2024-06-12
Payer: MEDICARE

## 2025-01-28 ENCOUNTER — ANESTHESIA (OUTPATIENT)
Dept: OPERATING ROOM | Age: 65
End: 2025-01-28
Payer: MEDICARE

## 2025-01-28 ENCOUNTER — HOSPITAL ENCOUNTER (INPATIENT)
Age: 65
LOS: 21 days | Discharge: NURSING, REHAB OR LONG-TERM CARE | DRG: 023 | End: 2025-02-18
Attending: EMERGENCY MEDICINE | Admitting: PSYCHIATRY & NEUROLOGY
Payer: MEDICARE

## 2025-01-28 ENCOUNTER — APPOINTMENT (OUTPATIENT)
Dept: CT IMAGING | Age: 65
DRG: 023 | End: 2025-01-28
Payer: MEDICARE

## 2025-01-28 ENCOUNTER — ANESTHESIA EVENT (OUTPATIENT)
Dept: OPERATING ROOM | Age: 65
End: 2025-01-28
Payer: MEDICARE

## 2025-01-28 ENCOUNTER — APPOINTMENT (OUTPATIENT)
Dept: GENERAL RADIOLOGY | Age: 65
DRG: 023 | End: 2025-01-28
Payer: MEDICARE

## 2025-01-28 DIAGNOSIS — I61.9 HEMORRHAGIC STROKE (HCC): Primary | ICD-10-CM

## 2025-01-28 DIAGNOSIS — I16.1 HYPERTENSIVE EMERGENCY: ICD-10-CM

## 2025-01-28 DIAGNOSIS — I62.9 INTRACRANIAL HEMORRHAGE (HCC): ICD-10-CM

## 2025-01-28 DIAGNOSIS — R51.9 HEADACHE, UNSPECIFIED HEADACHE TYPE: ICD-10-CM

## 2025-01-28 PROBLEM — J96.01 ACUTE RESPIRATORY FAILURE WITH HYPOXIA: Status: ACTIVE | Noted: 2025-01-28

## 2025-01-28 PROBLEM — G93.6 CEREBRAL EDEMA (HCC): Status: ACTIVE | Noted: 2025-01-28

## 2025-01-28 PROBLEM — J96.01 ACUTE RESPIRATORY FAILURE WITH HYPOXIA (HCC): Status: ACTIVE | Noted: 2025-01-28

## 2025-01-28 LAB
ABO + RH BLD: NORMAL
ANION GAP SERPL CALCULATED.3IONS-SCNC: 8 MMOL/L (ref 9–16)
ARM BAND NUMBER: NORMAL
ARTERIAL PATENCY WRIST A: ABNORMAL
BASOPHILS # BLD: <0.03 K/UL (ref 0–0.2)
BASOPHILS NFR BLD: 0 % (ref 0–2)
BLOOD BANK SAMPLE EXPIRATION: NORMAL
BLOOD GROUP ANTIBODIES SERPL: NEGATIVE
BODY TEMPERATURE: 36
BUN BLD-MCNC: 6 MG/DL (ref 8–26)
BUN SERPL-MCNC: 8 MG/DL (ref 8–23)
CA-I BLD-SCNC: 1.13 MMOL/L (ref 1.13–1.33)
CA-I BLD-SCNC: 1.17 MMOL/L (ref 1.15–1.33)
CALCIUM SERPL-MCNC: 8.3 MG/DL (ref 8.6–10.4)
CHLORIDE BLD-SCNC: 104 MMOL/L (ref 98–107)
CHLORIDE SERPL-SCNC: 99 MMOL/L (ref 98–107)
CHLORIDE, WHOLE BLOOD: 103 MMOL/L (ref 98–110)
CK SERPL-CCNC: 41 U/L (ref 39–308)
CO2 BLD CALC-SCNC: 27 MMOL/L (ref 22–30)
CO2 SERPL-SCNC: 24 MMOL/L (ref 20–31)
COHGB MFR BLD: 2.5 % (ref 0–5)
CREAT SERPL-MCNC: 0.8 MG/DL (ref 0.7–1.2)
EGFR, POC: 62 ML/MIN/1.73M2
EOSINOPHIL # BLD: 0.24 K/UL (ref 0–0.44)
EOSINOPHILS RELATIVE PERCENT: 4 % (ref 1–4)
ERYTHROCYTE [DISTWIDTH] IN BLOOD BY AUTOMATED COUNT: 12.3 % (ref 11.8–14.4)
FIO2 ON VENT: 100 %
FIO2: 30
GFR, ESTIMATED: 60 ML/MIN/1.73M2
GLUCOSE BLD-MCNC: 117 MG/DL (ref 74–100)
GLUCOSE BLD-MCNC: 131 MG/DL (ref 75–110)
GLUCOSE BLD-MCNC: 150 MG/DL (ref 74–100)
GLUCOSE SERPL-MCNC: 125 MG/DL (ref 74–99)
HCO3 ARTERIAL: 22.4 MMOL/L (ref 22–27)
HCO3 VENOUS: 27.1 MMOL/L (ref 22–29)
HCT VFR BLD AUTO: 36 % (ref 41–53)
HCT VFR BLD AUTO: 37 % (ref 40.7–50.3)
HCT VFR BLD CALC: 42 % (ref 40.7–50.3)
HEMOGLOBIN: 14.4 GM/DL (ref 13–17)
HGB BLD-MCNC: 12.6 G/DL (ref 13–17)
IMM GRANULOCYTES # BLD AUTO: <0.03 K/UL (ref 0–0.3)
IMM GRANULOCYTES NFR BLD: 0 %
INR PPP: 1.2
LACTIC ACID, WHOLE BLOOD: 1.2 MMOL/L (ref 0.7–2.1)
LYMPHOCYTES NFR BLD: 1.3 K/UL (ref 1.1–3.7)
LYMPHOCYTES RELATIVE PERCENT: 23 % (ref 24–43)
MCH RBC QN AUTO: 31.7 PG (ref 25.2–33.5)
MCHC RBC AUTO-ENTMCNC: 34.1 G/DL (ref 28.4–34.8)
MCV RBC AUTO: 93 FL (ref 82.6–102.9)
MONOCYTES NFR BLD: 0.41 K/UL (ref 0.1–1.2)
MONOCYTES NFR BLD: 7 % (ref 3–12)
MYOGLOBIN SERPL-MCNC: 34 NG/ML (ref 28–72)
NEGATIVE BASE EXCESS, ART: 1.7 MMOL/L (ref 0–2)
NEUTROPHILS NFR BLD: 66 % (ref 36–65)
NEUTS SEG NFR BLD: 3.79 K/UL (ref 1.5–8.1)
NRBC BLD-RTO: 0 PER 100 WBC
O2 SAT, ARTERIAL: 99.6 % (ref 94–100)
O2 SAT, VEN: 69 % (ref 60–85)
PARTIAL THROMBOPLASTIN TIME: 27.1 SEC (ref 23–36.5)
PCO2 ARTERIAL: 36.2 MMHG (ref 32–45)
PCO2 VENOUS: 51.3 MM HG (ref 41–51)
PH ARTERIAL: 7.41 (ref 7.35–7.45)
PH VENOUS: 7.33 (ref 7.32–7.43)
PLATELET # BLD AUTO: 174 K/UL (ref 138–453)
PMV BLD AUTO: 9.6 FL (ref 8.1–13.5)
PO2 ARTERIAL: 268.2 MMHG (ref 75–95)
PO2 VENOUS: 39.1 MM HG (ref 30–50)
POC ANION GAP: 10 MMOL/L (ref 7–16)
POC CREATININE: 0.7 MG/DL (ref 0.51–1.19)
POC HCO3: 30.6 MMOL/L (ref 21–28)
POC HEMOGLOBIN (CALC): 12.3 G/DL (ref 13.5–17.5)
POC LACTIC ACID: 1.4 MMOL/L (ref 0.56–1.39)
POC O2 SATURATION: 93.1 % (ref 94–98)
POC PCO2: 52.7 MM HG (ref 35–48)
POC PH: 7.37 (ref 7.35–7.45)
POC PO2: 70.5 MM HG (ref 83–108)
POSITIVE BASE EXCESS, ART: 4 MMOL/L (ref 0–3)
POSITIVE BASE EXCESS, VEN: 0.4 MMOL/L (ref 0–3)
POTASSIUM BLD-SCNC: 3.8 MMOL/L (ref 3.5–4.5)
POTASSIUM SERPL-SCNC: 4.4 MMOL/L (ref 3.7–5.3)
POTASSIUM, WHOLE BLOOD: 3.2 MMOL/L (ref 3.6–5)
PROTHROMBIN TIME: 15 SEC (ref 11.7–14.9)
RBC # BLD AUTO: 3.98 M/UL (ref 4.21–5.77)
SODIUM BLD-SCNC: 139 MMOL/L (ref 138–146)
SODIUM SERPL-SCNC: 131 MMOL/L (ref 136–145)
SODIUM, WHOLE BLOOD: 137 MMOL/L (ref 136–145)
TROPONIN I SERPL HS-MCNC: <6 NG/L (ref 0–22)
WBC OTHER # BLD: 5.8 K/UL (ref 3.5–11.3)

## 2025-01-28 PROCEDURE — 2000000000 HC ICU R&B

## 2025-01-28 PROCEDURE — C1763 CONN TISS, NON-HUMAN: HCPCS | Performed by: NEUROLOGICAL SURGERY

## 2025-01-28 PROCEDURE — 70496 CT ANGIOGRAPHY HEAD: CPT

## 2025-01-28 PROCEDURE — 84132 ASSAY OF SERUM POTASSIUM: CPT

## 2025-01-28 PROCEDURE — 82330 ASSAY OF CALCIUM: CPT

## 2025-01-28 PROCEDURE — 6360000004 HC RX CONTRAST MEDICATION

## 2025-01-28 PROCEDURE — 85025 COMPLETE CBC W/AUTO DIFF WBC: CPT

## 2025-01-28 PROCEDURE — 82803 BLOOD GASES ANY COMBINATION: CPT

## 2025-01-28 PROCEDURE — 82962 GLUCOSE BLOOD TEST: CPT

## 2025-01-28 PROCEDURE — 85018 HEMOGLOBIN: CPT

## 2025-01-28 PROCEDURE — 85610 PROTHROMBIN TIME: CPT

## 2025-01-28 PROCEDURE — 71045 X-RAY EXAM CHEST 1 VIEW: CPT

## 2025-01-28 PROCEDURE — 84484 ASSAY OF TROPONIN QUANT: CPT

## 2025-01-28 PROCEDURE — 85730 THROMBOPLASTIN TIME PARTIAL: CPT

## 2025-01-28 PROCEDURE — 83874 ASSAY OF MYOGLOBIN: CPT

## 2025-01-28 PROCEDURE — 2580000003 HC RX 258: Performed by: NURSE PRACTITIONER

## 2025-01-28 PROCEDURE — 80051 ELECTROLYTE PANEL: CPT

## 2025-01-28 PROCEDURE — 6360000002 HC RX W HCPCS

## 2025-01-28 PROCEDURE — 99291 CRITICAL CARE FIRST HOUR: CPT | Performed by: STUDENT IN AN ORGANIZED HEALTH CARE EDUCATION/TRAINING PROGRAM

## 2025-01-28 PROCEDURE — APPSS30 APP SPLIT SHARED TIME 16-30 MINUTES: Performed by: NURSE PRACTITIONER

## 2025-01-28 PROCEDURE — 86900 BLOOD TYPING SEROLOGIC ABO: CPT

## 2025-01-28 PROCEDURE — 37799 UNLISTED PX VASCULAR SURGERY: CPT

## 2025-01-28 PROCEDURE — 94761 N-INVAS EAR/PLS OXIMETRY MLT: CPT

## 2025-01-28 PROCEDURE — 82550 ASSAY OF CK (CPK): CPT

## 2025-01-28 PROCEDURE — 82565 ASSAY OF CREATININE: CPT

## 2025-01-28 PROCEDURE — 96375 TX/PRO/DX INJ NEW DRUG ADDON: CPT

## 2025-01-28 PROCEDURE — 80048 BASIC METABOLIC PNL TOTAL CA: CPT

## 2025-01-28 PROCEDURE — 2580000003 HC RX 258

## 2025-01-28 PROCEDURE — 2500000003 HC RX 250 WO HCPCS

## 2025-01-28 PROCEDURE — 84295 ASSAY OF SERUM SODIUM: CPT

## 2025-01-28 PROCEDURE — 3700000000 HC ANESTHESIA ATTENDED CARE: Performed by: NEUROLOGICAL SURGERY

## 2025-01-28 PROCEDURE — 00C70ZZ EXTIRPATION OF MATTER FROM CEREBRAL HEMISPHERE, OPEN APPROACH: ICD-10-PCS | Performed by: NEUROLOGICAL SURGERY

## 2025-01-28 PROCEDURE — 5A1935Z RESPIRATORY VENTILATION, LESS THAN 24 CONSECUTIVE HOURS: ICD-10-PCS | Performed by: PSYCHIATRY & NEUROLOGY

## 2025-01-28 PROCEDURE — 2700000000 HC OXYGEN THERAPY PER DAY

## 2025-01-28 PROCEDURE — 2720000010 HC SURG SUPPLY STERILE: Performed by: NEUROLOGICAL SURGERY

## 2025-01-28 PROCEDURE — 96365 THER/PROPH/DIAG IV INF INIT: CPT

## 2025-01-28 PROCEDURE — 86901 BLOOD TYPING SEROLOGIC RH(D): CPT

## 2025-01-28 PROCEDURE — 82553 CREATINE MB FRACTION: CPT

## 2025-01-28 PROCEDURE — 82805 BLOOD GASES W/O2 SATURATION: CPT

## 2025-01-28 PROCEDURE — 6370000000 HC RX 637 (ALT 250 FOR IP): Performed by: NURSE PRACTITIONER

## 2025-01-28 PROCEDURE — 3700000001 HC ADD 15 MINUTES (ANESTHESIA): Performed by: NEUROLOGICAL SURGERY

## 2025-01-28 PROCEDURE — 82435 ASSAY OF BLOOD CHLORIDE: CPT

## 2025-01-28 PROCEDURE — 82947 ASSAY GLUCOSE BLOOD QUANT: CPT

## 2025-01-28 PROCEDURE — 84520 ASSAY OF UREA NITROGEN: CPT

## 2025-01-28 PROCEDURE — 99291 CRITICAL CARE FIRST HOUR: CPT

## 2025-01-28 PROCEDURE — 70450 CT HEAD/BRAIN W/O DYE: CPT

## 2025-01-28 PROCEDURE — 85014 HEMATOCRIT: CPT

## 2025-01-28 PROCEDURE — 99285 EMERGENCY DEPT VISIT HI MDM: CPT | Performed by: PSYCHIATRY & NEUROLOGY

## 2025-01-28 PROCEDURE — 83605 ASSAY OF LACTIC ACID: CPT

## 2025-01-28 PROCEDURE — 3600000004 HC SURGERY LEVEL 4 BASE: Performed by: NEUROLOGICAL SURGERY

## 2025-01-28 PROCEDURE — 86850 RBC ANTIBODY SCREEN: CPT

## 2025-01-28 PROCEDURE — 2709999900 HC NON-CHARGEABLE SUPPLY: Performed by: NEUROLOGICAL SURGERY

## 2025-01-28 PROCEDURE — 2500000003 HC RX 250 WO HCPCS: Performed by: NEUROLOGICAL SURGERY

## 2025-01-28 PROCEDURE — 6360000002 HC RX W HCPCS: Performed by: NEUROLOGICAL SURGERY

## 2025-01-28 PROCEDURE — 6360000002 HC RX W HCPCS: Performed by: NURSE PRACTITIONER

## 2025-01-28 PROCEDURE — 94002 VENT MGMT INPAT INIT DAY: CPT

## 2025-01-28 PROCEDURE — 2500000003 HC RX 250 WO HCPCS: Performed by: NURSE PRACTITIONER

## 2025-01-28 PROCEDURE — 3600000014 HC SURGERY LEVEL 4 ADDTL 15MIN: Performed by: NEUROLOGICAL SURGERY

## 2025-01-28 PROCEDURE — 6370000000 HC RX 637 (ALT 250 FOR IP): Performed by: NEUROLOGICAL SURGERY

## 2025-01-28 PROCEDURE — 36620 INSERTION CATHETER ARTERY: CPT

## 2025-01-28 PROCEDURE — 8E09XBZ COMPUTER ASSISTED PROCEDURE OF HEAD AND NECK REGION: ICD-10-PCS | Performed by: NEUROLOGICAL SURGERY

## 2025-01-28 PROCEDURE — C1713 ANCHOR/SCREW BN/BN,TIS/BN: HCPCS | Performed by: NEUROLOGICAL SURGERY

## 2025-01-28 DEVICE — NEURO SCREWS, CROSS-PIN, SELF-DRILLING: Type: IMPLANTABLE DEVICE | Site: SKULL | Status: FUNCTIONAL

## 2025-01-28 DEVICE — DURAGEN® PLUS DURAL REGENERATION MATRIX, 2 IN X 2 IN (5 CM X 5 CM)
Type: IMPLANTABLE DEVICE | Status: FUNCTIONAL
Brand: DURAGEN® PLUS

## 2025-01-28 DEVICE — LOW PROFILE BHC PLATE,W/TAB, 14MM
Type: IMPLANTABLE DEVICE | Site: SKULL | Status: FUNCTIONAL
Brand: UNIVERSAL NEURO 2

## 2025-01-28 RX ORDER — CHLORHEXIDINE GLUCONATE ORAL RINSE 1.2 MG/ML
15 SOLUTION DENTAL 2 TIMES DAILY
Status: DISCONTINUED | OUTPATIENT
Start: 2025-01-28 | End: 2025-01-29

## 2025-01-28 RX ORDER — FENTANYL CITRATE 50 UG/ML
INJECTION, SOLUTION INTRAMUSCULAR; INTRAVENOUS
Status: DISCONTINUED | OUTPATIENT
Start: 2025-01-28 | End: 2025-01-28 | Stop reason: SDUPTHER

## 2025-01-28 RX ORDER — ONDANSETRON 2 MG/ML
4 INJECTION INTRAMUSCULAR; INTRAVENOUS EVERY 6 HOURS PRN
Status: DISCONTINUED | OUTPATIENT
Start: 2025-01-28 | End: 2025-02-18 | Stop reason: HOSPADM

## 2025-01-28 RX ORDER — PROPOFOL 10 MG/ML
5-50 INJECTION, EMULSION INTRAVENOUS CONTINUOUS
Status: DISCONTINUED | OUTPATIENT
Start: 2025-01-28 | End: 2025-01-29

## 2025-01-28 RX ORDER — ACETAMINOPHEN 325 MG/1
650 TABLET ORAL EVERY 6 HOURS PRN
Status: DISCONTINUED | OUTPATIENT
Start: 2025-01-28 | End: 2025-02-18 | Stop reason: HOSPADM

## 2025-01-28 RX ORDER — SODIUM CHLORIDE, SODIUM LACTATE, POTASSIUM CHLORIDE, CALCIUM CHLORIDE 600; 310; 30; 20 MG/100ML; MG/100ML; MG/100ML; MG/100ML
INJECTION, SOLUTION INTRAVENOUS
Status: DISCONTINUED | OUTPATIENT
Start: 2025-01-28 | End: 2025-01-28 | Stop reason: SDUPTHER

## 2025-01-28 RX ORDER — ACETAMINOPHEN 650 MG/1
650 SUPPOSITORY RECTAL EVERY 6 HOURS PRN
Status: DISCONTINUED | OUTPATIENT
Start: 2025-01-28 | End: 2025-02-18 | Stop reason: HOSPADM

## 2025-01-28 RX ORDER — IOPAMIDOL 755 MG/ML
75 INJECTION, SOLUTION INTRAVASCULAR
Status: COMPLETED | OUTPATIENT
Start: 2025-01-28 | End: 2025-01-28

## 2025-01-28 RX ORDER — NICARDIPINE HYDROCHLORIDE 0.1 MG/ML
INJECTION INTRAVENOUS
Status: DISPENSED
Start: 2025-01-28 | End: 2025-01-29

## 2025-01-28 RX ORDER — SODIUM CHLORIDE 9 MG/ML
INJECTION, SOLUTION INTRAVENOUS CONTINUOUS
Status: DISCONTINUED | OUTPATIENT
Start: 2025-01-28 | End: 2025-02-04

## 2025-01-28 RX ORDER — PROPOFOL 10 MG/ML
INJECTION, EMULSION INTRAVENOUS
Status: DISCONTINUED | OUTPATIENT
Start: 2025-01-28 | End: 2025-01-28 | Stop reason: SDUPTHER

## 2025-01-28 RX ORDER — LEVETIRACETAM 10 MG/ML
INJECTION INTRAVASCULAR
Status: DISCONTINUED | OUTPATIENT
Start: 2025-01-28 | End: 2025-01-28 | Stop reason: SDUPTHER

## 2025-01-28 RX ORDER — LABETALOL HYDROCHLORIDE 5 MG/ML
10 INJECTION, SOLUTION INTRAVENOUS EVERY 10 MIN PRN
Status: DISCONTINUED | OUTPATIENT
Start: 2025-01-28 | End: 2025-02-05

## 2025-01-28 RX ORDER — 3% SODIUM CHLORIDE 3 G/100ML
INJECTION, SOLUTION INTRAVENOUS
Status: COMPLETED
Start: 2025-01-28 | End: 2025-01-28

## 2025-01-28 RX ORDER — ROCURONIUM BROMIDE 10 MG/ML
INJECTION, SOLUTION INTRAVENOUS
Status: DISCONTINUED | OUTPATIENT
Start: 2025-01-28 | End: 2025-01-28 | Stop reason: SDUPTHER

## 2025-01-28 RX ORDER — MIDAZOLAM HYDROCHLORIDE 1 MG/ML
INJECTION, SOLUTION INTRAMUSCULAR; INTRAVENOUS
Status: DISCONTINUED | OUTPATIENT
Start: 2025-01-28 | End: 2025-01-28 | Stop reason: SDUPTHER

## 2025-01-28 RX ORDER — HYDRALAZINE HYDROCHLORIDE 20 MG/ML
10 INJECTION INTRAMUSCULAR; INTRAVENOUS
Status: DISCONTINUED | OUTPATIENT
Start: 2025-01-28 | End: 2025-02-05

## 2025-01-28 RX ORDER — LIDOCAINE HYDROCHLORIDE AND EPINEPHRINE 10; 10 MG/ML; UG/ML
INJECTION, SOLUTION INFILTRATION; PERINEURAL PRN
Status: DISCONTINUED | OUTPATIENT
Start: 2025-01-28 | End: 2025-01-28 | Stop reason: ALTCHOICE

## 2025-01-28 RX ORDER — CEFAZOLIN SODIUM 1 G/3ML
INJECTION, POWDER, FOR SOLUTION INTRAMUSCULAR; INTRAVENOUS
Status: DISCONTINUED | OUTPATIENT
Start: 2025-01-28 | End: 2025-01-28 | Stop reason: SDUPTHER

## 2025-01-28 RX ORDER — SODIUM CHLORIDE 0.9 % (FLUSH) 0.9 %
5-40 SYRINGE (ML) INJECTION PRN
Status: DISCONTINUED | OUTPATIENT
Start: 2025-01-28 | End: 2025-02-18 | Stop reason: HOSPADM

## 2025-01-28 RX ORDER — ONDANSETRON 4 MG/1
4 TABLET, ORALLY DISINTEGRATING ORAL EVERY 8 HOURS PRN
Status: DISCONTINUED | OUTPATIENT
Start: 2025-01-28 | End: 2025-02-18 | Stop reason: HOSPADM

## 2025-01-28 RX ORDER — SODIUM CHLORIDE 0.9 % (FLUSH) 0.9 %
5-40 SYRINGE (ML) INJECTION EVERY 12 HOURS SCHEDULED
Status: DISCONTINUED | OUTPATIENT
Start: 2025-01-28 | End: 2025-02-18 | Stop reason: HOSPADM

## 2025-01-28 RX ORDER — MANNITOL 20 G/100ML
INJECTION, SOLUTION INTRAVENOUS
Status: DISCONTINUED | OUTPATIENT
Start: 2025-01-28 | End: 2025-01-28 | Stop reason: SDUPTHER

## 2025-01-28 RX ORDER — SODIUM CHLORIDE 9 MG/ML
INJECTION, SOLUTION INTRAVENOUS PRN
Status: DISCONTINUED | OUTPATIENT
Start: 2025-01-28 | End: 2025-02-18 | Stop reason: HOSPADM

## 2025-01-28 RX ADMIN — SODIUM CHLORIDE 7.5 MG/HR: 9 INJECTION, SOLUTION INTRAVENOUS at 21:30

## 2025-01-28 RX ADMIN — ROCURONIUM BROMIDE 25 MG: 10 INJECTION, SOLUTION INTRAVENOUS at 20:39

## 2025-01-28 RX ADMIN — ROCURONIUM BROMIDE 25 MG: 10 INJECTION, SOLUTION INTRAVENOUS at 19:29

## 2025-01-28 RX ADMIN — FENTANYL CITRATE 50 MCG: 50 INJECTION, SOLUTION INTRAMUSCULAR; INTRAVENOUS at 21:03

## 2025-01-28 RX ADMIN — MANNITOL 50 G: 20 INJECTION, SOLUTION INTRAVENOUS at 18:58

## 2025-01-28 RX ADMIN — SODIUM CHLORIDE, PRESERVATIVE FREE 10 ML: 5 INJECTION INTRAVENOUS at 22:14

## 2025-01-28 RX ADMIN — SODIUM CHLORIDE: 9 INJECTION, SOLUTION INTRAVENOUS at 21:52

## 2025-01-28 RX ADMIN — SODIUM CHLORIDE 250 ML: 3 INJECTION, SOLUTION INTRAVENOUS at 15:48

## 2025-01-28 RX ADMIN — MIDAZOLAM 2 MG: 1 INJECTION INTRAMUSCULAR; INTRAVENOUS at 19:08

## 2025-01-28 RX ADMIN — ROCURONIUM BROMIDE 50 MG: 10 INJECTION, SOLUTION INTRAVENOUS at 18:35

## 2025-01-28 RX ADMIN — FAMOTIDINE 20 MG: 10 INJECTION, SOLUTION INTRAVENOUS at 22:13

## 2025-01-28 RX ADMIN — LEVETIRACETAM 1000 MG: 10 INJECTION, SOLUTION INTRAVENOUS at 18:51

## 2025-01-28 RX ADMIN — PROPOFOL 50 MG: 10 INJECTION, EMULSION INTRAVENOUS at 19:13

## 2025-01-28 RX ADMIN — IOPAMIDOL 75 ML: 755 INJECTION, SOLUTION INTRAVENOUS at 15:46

## 2025-01-28 RX ADMIN — PROPOFOL 20 MCG/KG/MIN: 10 INJECTION, EMULSION INTRAVENOUS at 16:08

## 2025-01-28 RX ADMIN — CHLORHEXIDINE GLUCONATE 15 ML: 1.2 SOLUTION ORAL at 22:14

## 2025-01-28 RX ADMIN — CEFAZOLIN 2 G: 1 INJECTION, POWDER, FOR SOLUTION INTRAMUSCULAR; INTRAVENOUS at 19:28

## 2025-01-28 RX ADMIN — SODIUM CHLORIDE, POTASSIUM CHLORIDE, SODIUM LACTATE AND CALCIUM CHLORIDE: 600; 310; 30; 20 INJECTION, SOLUTION INTRAVENOUS at 18:32

## 2025-01-28 RX ADMIN — PHENYLEPHRINE HYDROCHLORIDE 25 MCG/MIN: 10 INJECTION INTRAVENOUS at 20:12

## 2025-01-28 RX ADMIN — FENTANYL CITRATE 50 MCG: 50 INJECTION, SOLUTION INTRAMUSCULAR; INTRAVENOUS at 19:02

## 2025-01-28 RX ADMIN — SODIUM CHLORIDE: 9 INJECTION, SOLUTION INTRAVENOUS at 18:32

## 2025-01-28 ASSESSMENT — PULMONARY FUNCTION TESTS
PIF_VALUE: 27
PIF_VALUE: 25
PIF_VALUE: 18
PIF_VALUE: 22
PIF_VALUE: 28
PIF_VALUE: 28
PIF_VALUE: 16
PIF_VALUE: 25
PIF_VALUE: 17
PIF_VALUE: 26
PIF_VALUE: 26
PIF_VALUE: 27

## 2025-01-28 NOTE — ED PROVIDER NOTES
Note Started: 5:06 PM ALANNAH         [facility]     Emergency Department     Faculty Attestation    I performed a history and physical examination of the patient and discussed management with the resident. I reviewed the resident’s note and agree with the documented findings and plan of care. Any areas of disagreement are noted on the chart. I was personally present for the key portions of any procedures. I have documented in the chart those procedures where I was not present during the key portions. I have reviewed the emergency nurses triage note. I agree with the chief complaint, past medical history, past surgical history, allergies, medications, social and family history as documented unless otherwise noted below.        For Physician Assistant/ Nurse Practitioner cases/documentation I have personally evaluated this patient and have completed at least one if not all key elements of the E/M (history, physical exam, and MDM). Additional findings are as noted.  I have personally seen and evaluated the patient.  I find the patient's history and physical exam are consistent with the NP/PA documentation.  I agree with the care provided, treatment rendered, disposition and follow-up plan.    64-year-old male brought in by flight with suspected stroke.  Patient's last known well was approximately 2:15 PM.  Home nursing came to the house after that, found him with right-sided weakness.  He was initially transported by local EMS, who met [facility] at the [facility].  When they arrived to the [facility], patient had decreased level of consciousness, was not managing his secretions, had a GCS of 6.  He was able to raise his left hand voluntarily and was trying to follow commands he was intubated with etomidate and succinylcholine by [facility].  Managed with Versed and fentanyl and route.  Blood pressure initially was 185/95 with significant bradycardia in the 40s to 50s.  Patient has a history of cardiac 
completion:  Procedure terminated electively by provider  Comments:      R radial artery cannulated and hematoma developed, obstructing line placement.    CONSULTS:  IP CONSULT TO NEUROSURGERY  IP CONSULT TO NEUROCRITICAL CARE  IP CONSULT TO ENDOVASCULAR NEUROSURGERY  IP CONSULT TO CASE MANAGEMENT      FINAL IMPRESSION      1. Hemorrhagic stroke (HCC)    2. Hypertensive emergency          DISPOSITION / PLAN     DISPOSITION Admitted 01/28/2025 04:09:12 PM           PATIENT REFERRED TO:  No follow-up provider specified.    DISCHARGE MEDICATIONS:  There are no discharge medications for this patient.      Mark Pinon, DO  Emergency Medicine Resident    (Please note that portions of this note were completed with a voice recognition program.  Efforts were made to edit the dictations but occasionally words are mis-transcribed.)

## 2025-01-28 NOTE — ED NOTES
Pt arrives via [facility] 2 as stroke with RACE of 8.   LKW 1415  Pt SO called out today for EMS due to R sided weakness.   LF arrives on scene to pt making no purposeful movement besides his left arm. Pt was drooling and choking on saliva so they intubated for airway protection with GCS of 6.   LF used etomidate and succ to intubate.   Pt was given 10mg versed and 100mcg of fent total for sedation.   Pt arrived intubate 22 at lip with 7.5 et

## 2025-01-29 ENCOUNTER — APPOINTMENT (OUTPATIENT)
Dept: GENERAL RADIOLOGY | Age: 65
DRG: 023 | End: 2025-01-29
Payer: MEDICARE

## 2025-01-29 ENCOUNTER — APPOINTMENT (OUTPATIENT)
Age: 65
DRG: 023 | End: 2025-01-29
Payer: MEDICARE

## 2025-01-29 ENCOUNTER — APPOINTMENT (OUTPATIENT)
Dept: CT IMAGING | Age: 65
DRG: 023 | End: 2025-01-29
Payer: MEDICARE

## 2025-01-29 LAB
ANION GAP SERPL CALCULATED.3IONS-SCNC: 10 MMOL/L (ref 9–16)
BASOPHILS # BLD: <0.03 K/UL (ref 0–0.2)
BASOPHILS NFR BLD: 0 % (ref 0–2)
BILIRUB UR QL STRIP: NEGATIVE
BUN SERPL-MCNC: 7 MG/DL (ref 8–23)
CALCIUM SERPL-MCNC: 9.1 MG/DL (ref 8.6–10.4)
CHLORIDE SERPL-SCNC: 105 MMOL/L (ref 98–107)
CHOLEST SERPL-MCNC: 138 MG/DL (ref 0–199)
CHOLESTEROL/HDL RATIO: 2.5
CLARITY UR: CLEAR
CO2 SERPL-SCNC: 23 MMOL/L (ref 20–31)
COLOR UR: YELLOW
CREAT SERPL-MCNC: 0.7 MG/DL (ref 0.7–1.2)
ECHO AO ROOT DIAM: 4 CM
ECHO AO ROOT INDEX: 1.9 CM/M2
ECHO AV AREA PEAK VELOCITY: 3.2 CM2
ECHO AV AREA VTI: 2.9 CM2
ECHO AV AREA/BSA PEAK VELOCITY: 1.5 CM2/M2
ECHO AV AREA/BSA VTI: 1.4 CM2/M2
ECHO AV MEAN GRADIENT: 4 MMHG
ECHO AV MEAN GRADIENT: 4 MMHG
ECHO AV MEAN VELOCITY: 0.9 M/S
ECHO AV PEAK GRADIENT: 8 MMHG
ECHO AV PEAK VELOCITY: 1.4 M/S
ECHO AV VELOCITY RATIO: 0.79
ECHO AV VTI: 23.1 CM
ECHO BSA: 2.1 M2
ECHO EST RA PRESSURE: 0 MMHG
ECHO LA AREA 2C: 13.1 CM2
ECHO LA AREA 4C: 13.8 CM2
ECHO LA DIAMETER INDEX: 1.18 CM/M2
ECHO LA DIAMETER: 2.5 CM
ECHO LA MAJOR AXIS: 4.2 CM
ECHO LA MINOR AXIS: 3.9 CM
ECHO LA TO AORTIC ROOT RATIO: 0.63
ECHO LA VOL BP: 36 ML (ref 18–58)
ECHO LA VOL MOD A2C: 36 ML (ref 18–58)
ECHO LA VOL MOD A4C: 33 ML (ref 18–58)
ECHO LA VOL/BSA BIPLANE: 17 ML/M2 (ref 16–34)
ECHO LA VOLUME INDEX MOD A2C: 17 ML/M2 (ref 16–34)
ECHO LA VOLUME INDEX MOD A4C: 16 ML/M2 (ref 16–34)
ECHO LV E' LATERAL VELOCITY: 5.22 CM/S
ECHO LV E' SEPTAL VELOCITY: 5 CM/S
ECHO LV EF PHYSICIAN: 55 %
ECHO LV FRACTIONAL SHORTENING: 44 % (ref 28–44)
ECHO LV INTERNAL DIMENSION DIASTOLE INDEX: 1.94 CM/M2
ECHO LV INTERNAL DIMENSION DIASTOLIC: 4.1 CM (ref 4.2–5.9)
ECHO LV INTERNAL DIMENSION SYSTOLIC INDEX: 1.09 CM/M2
ECHO LV INTERNAL DIMENSION SYSTOLIC: 2.3 CM
ECHO LV IVSD: 0.9 CM (ref 0.6–1)
ECHO LV MASS 2D: 114.1 G (ref 88–224)
ECHO LV MASS INDEX 2D: 54.1 G/M2 (ref 49–115)
ECHO LV POSTERIOR WALL DIASTOLIC: 0.9 CM (ref 0.6–1)
ECHO LV RELATIVE WALL THICKNESS RATIO: 0.44
ECHO LVOT AREA: 4.2 CM2
ECHO LVOT AV VTI INDEX: 0.7
ECHO LVOT DIAM: 2.3 CM
ECHO LVOT MEAN GRADIENT: 2 MMHG
ECHO LVOT PEAK GRADIENT: 5 MMHG
ECHO LVOT PEAK VELOCITY: 1.1 M/S
ECHO LVOT STROKE VOLUME INDEX: 31.7 ML/M2
ECHO LVOT SV: 66.9 ML
ECHO LVOT VTI: 16.1 CM
ECHO MV A VELOCITY: 0.71 M/S
ECHO MV AREA VTI: 2.4 CM2
ECHO MV E DECELERATION TIME (DT): 218 MS
ECHO MV E VELOCITY: 0.67 M/S
ECHO MV E/A RATIO: 0.94
ECHO MV E/E' LATERAL: 12.84
ECHO MV E/E' RATIO (AVERAGED): 13.12
ECHO MV E/E' SEPTAL: 13.4
ECHO MV LVOT VTI INDEX: 1.75
ECHO MV MAX VELOCITY: 1.1 M/S
ECHO MV MEAN GRADIENT: 1 MMHG
ECHO MV MEAN VELOCITY: 0.5 M/S
ECHO MV PEAK GRADIENT: 5 MMHG
ECHO MV VTI: 28.1 CM
ECHO PV MAX VELOCITY: 1.4 M/S
ECHO PV PEAK GRADIENT: 8 MMHG
ECHO RA AREA 4C: 8.9 CM2
ECHO RA END SYSTOLIC VOLUME APICAL 4 CHAMBER INDEX BSA: 8 ML/M2
ECHO RA VOLUME: 16 ML
ECHO RIGHT VENTRICULAR SYSTOLIC PRESSURE (RVSP): 35 MMHG
ECHO RV BASAL DIMENSION: 2.9 CM
ECHO RV FREE WALL PEAK S': 17.8 CM/S
ECHO TV REGURGITANT MAX VELOCITY: 2.94 M/S
ECHO TV REGURGITANT PEAK GRADIENT: 35 MMHG
EOSINOPHIL # BLD: 0.09 K/UL (ref 0–0.44)
EOSINOPHILS RELATIVE PERCENT: 1 % (ref 1–4)
EPI CELLS #/AREA URNS HPF: NORMAL /HPF (ref 0–5)
ERYTHROCYTE [DISTWIDTH] IN BLOOD BY AUTOMATED COUNT: 12.5 % (ref 11.8–14.4)
EST. AVERAGE GLUCOSE BLD GHB EST-MCNC: 103 MG/DL
GFR, ESTIMATED: >90 ML/MIN/1.73M2
GLUCOSE BLD-MCNC: 135 MG/DL (ref 74–100)
GLUCOSE SERPL-MCNC: 126 MG/DL (ref 74–99)
GLUCOSE UR STRIP-MCNC: NEGATIVE MG/DL
HBA1C MFR BLD: 5.2 % (ref 4–6)
HCT VFR BLD AUTO: 39.9 % (ref 40.7–50.3)
HDLC SERPL-MCNC: 56 MG/DL
HGB BLD-MCNC: 13.8 G/DL (ref 13–17)
HGB UR QL STRIP.AUTO: ABNORMAL
IMM GRANULOCYTES # BLD AUTO: 0.05 K/UL (ref 0–0.3)
IMM GRANULOCYTES NFR BLD: 0 %
KETONES UR STRIP-MCNC: NEGATIVE MG/DL
LDLC SERPL CALC-MCNC: 66 MG/DL (ref 0–100)
LEUKOCYTE ESTERASE UR QL STRIP: NEGATIVE
LYMPHOCYTES NFR BLD: 1.06 K/UL (ref 1.1–3.7)
LYMPHOCYTES RELATIVE PERCENT: 9 % (ref 24–43)
MAGNESIUM SERPL-MCNC: 1.6 MG/DL (ref 1.6–2.4)
MCH RBC QN AUTO: 31.5 PG (ref 25.2–33.5)
MCHC RBC AUTO-ENTMCNC: 34.6 G/DL (ref 28.4–34.8)
MCV RBC AUTO: 91.1 FL (ref 82.6–102.9)
MONOCYTES NFR BLD: 0.93 K/UL (ref 0.1–1.2)
MONOCYTES NFR BLD: 8 % (ref 3–12)
MRSA, DNA, NASAL: NEGATIVE
NEUTROPHILS NFR BLD: 82 % (ref 36–65)
NEUTS SEG NFR BLD: 9.46 K/UL (ref 1.5–8.1)
NITRITE UR QL STRIP: NEGATIVE
NRBC BLD-RTO: 0 PER 100 WBC
O2 DELIVERY DEVICE: ABNORMAL
PATIENT TEMP: 38.1
PH UR STRIP: 7 [PH] (ref 5–8)
PLATELET # BLD AUTO: 188 K/UL (ref 138–453)
PMV BLD AUTO: 9.7 FL (ref 8.1–13.5)
POC HCO3: 26.4 MMOL/L (ref 21–28)
POC O2 SATURATION: 98.7 % (ref 94–98)
POC PCO2 TEMP: 37.7 MM HG
POC PCO2: 36 MM HG (ref 35–48)
POC PH TEMP: 7.46
POC PH: 7.47 (ref 7.35–7.45)
POC PO2 TEMP: 118.8 MM HG
POC PO2: 111.8 MM HG (ref 83–108)
POSITIVE BASE EXCESS, ART: 2.9 MMOL/L (ref 0–3)
POTASSIUM SERPL-SCNC: 3.3 MMOL/L (ref 3.7–5.3)
PROT UR STRIP-MCNC: NEGATIVE MG/DL
RBC # BLD AUTO: 4.38 M/UL (ref 4.21–5.77)
RBC #/AREA URNS HPF: NORMAL /HPF (ref 0–2)
SODIUM SERPL-SCNC: 138 MMOL/L (ref 136–145)
SP GR UR STRIP: 1.02 (ref 1–1.03)
SPECIMEN DESCRIPTION: NORMAL
TRIGL SERPL-MCNC: 82 MG/DL
TSH SERPL DL<=0.05 MIU/L-ACNC: 0.67 UIU/ML (ref 0.27–4.2)
UROBILINOGEN UR STRIP-ACNC: NORMAL EU/DL (ref 0–1)
VLDLC SERPL CALC-MCNC: 16 MG/DL (ref 1–30)
WBC #/AREA URNS HPF: NORMAL /HPF (ref 0–5)
WBC OTHER # BLD: 11.6 K/UL (ref 3.5–11.3)

## 2025-01-29 PROCEDURE — 2580000003 HC RX 258: Performed by: NURSE PRACTITIONER

## 2025-01-29 PROCEDURE — 84443 ASSAY THYROID STIM HORMONE: CPT

## 2025-01-29 PROCEDURE — 94003 VENT MGMT INPAT SUBQ DAY: CPT

## 2025-01-29 PROCEDURE — 6360000002 HC RX W HCPCS: Performed by: NURSE PRACTITIONER

## 2025-01-29 PROCEDURE — 6370000000 HC RX 637 (ALT 250 FOR IP): Performed by: NURSE PRACTITIONER

## 2025-01-29 PROCEDURE — 94761 N-INVAS EAR/PLS OXIMETRY MLT: CPT

## 2025-01-29 PROCEDURE — 99223 1ST HOSP IP/OBS HIGH 75: CPT | Performed by: PSYCHIATRY & NEUROLOGY

## 2025-01-29 PROCEDURE — 99291 CRITICAL CARE FIRST HOUR: CPT | Performed by: STUDENT IN AN ORGANIZED HEALTH CARE EDUCATION/TRAINING PROGRAM

## 2025-01-29 PROCEDURE — 82803 BLOOD GASES ANY COMBINATION: CPT

## 2025-01-29 PROCEDURE — 2700000000 HC OXYGEN THERAPY PER DAY

## 2025-01-29 PROCEDURE — 80048 BASIC METABOLIC PNL TOTAL CA: CPT

## 2025-01-29 PROCEDURE — 74018 RADEX ABDOMEN 1 VIEW: CPT

## 2025-01-29 PROCEDURE — 81001 URINALYSIS AUTO W/SCOPE: CPT

## 2025-01-29 PROCEDURE — 2000000000 HC ICU R&B

## 2025-01-29 PROCEDURE — 85025 COMPLETE CBC W/AUTO DIFF WBC: CPT

## 2025-01-29 PROCEDURE — 70450 CT HEAD/BRAIN W/O DYE: CPT

## 2025-01-29 PROCEDURE — 83735 ASSAY OF MAGNESIUM: CPT

## 2025-01-29 PROCEDURE — 2500000003 HC RX 250 WO HCPCS: Performed by: NURSE PRACTITIONER

## 2025-01-29 PROCEDURE — 71045 X-RAY EXAM CHEST 1 VIEW: CPT

## 2025-01-29 PROCEDURE — 83036 HEMOGLOBIN GLYCOSYLATED A1C: CPT

## 2025-01-29 PROCEDURE — 82947 ASSAY GLUCOSE BLOOD QUANT: CPT

## 2025-01-29 PROCEDURE — 93306 TTE W/DOPPLER COMPLETE: CPT

## 2025-01-29 PROCEDURE — 93306 TTE W/DOPPLER COMPLETE: CPT | Performed by: STUDENT IN AN ORGANIZED HEALTH CARE EDUCATION/TRAINING PROGRAM

## 2025-01-29 PROCEDURE — 87641 MR-STAPH DNA AMP PROBE: CPT

## 2025-01-29 PROCEDURE — 80061 LIPID PANEL: CPT

## 2025-01-29 RX ORDER — CHLORHEXIDINE GLUCONATE ORAL RINSE 1.2 MG/ML
15 SOLUTION DENTAL 2 TIMES DAILY
Status: DISCONTINUED | OUTPATIENT
Start: 2025-01-29 | End: 2025-01-31

## 2025-01-29 RX ORDER — IPRATROPIUM BROMIDE AND ALBUTEROL SULFATE 2.5; .5 MG/3ML; MG/3ML
1 SOLUTION RESPIRATORY (INHALATION) EVERY 4 HOURS PRN
Status: DISCONTINUED | OUTPATIENT
Start: 2025-01-29 | End: 2025-02-18 | Stop reason: HOSPADM

## 2025-01-29 RX ORDER — AMLODIPINE BESYLATE 10 MG/1
5 TABLET ORAL DAILY
Status: DISCONTINUED | OUTPATIENT
Start: 2025-01-29 | End: 2025-01-30

## 2025-01-29 RX ORDER — FENTANYL CITRATE 50 UG/ML
25 INJECTION, SOLUTION INTRAMUSCULAR; INTRAVENOUS
Status: DISCONTINUED | OUTPATIENT
Start: 2025-01-29 | End: 2025-01-30

## 2025-01-29 RX ORDER — MAGNESIUM SULFATE IN WATER 40 MG/ML
2000 INJECTION, SOLUTION INTRAVENOUS ONCE
Status: COMPLETED | OUTPATIENT
Start: 2025-01-29 | End: 2025-01-29

## 2025-01-29 RX ADMIN — POTASSIUM BICARBONATE 30 MEQ: 782 TABLET, EFFERVESCENT ORAL at 12:00

## 2025-01-29 RX ADMIN — FENTANYL CITRATE 25 MCG: 50 INJECTION, SOLUTION INTRAMUSCULAR; INTRAVENOUS at 22:41

## 2025-01-29 RX ADMIN — MAGNESIUM SULFATE HEPTAHYDRATE 2000 MG: 40 INJECTION, SOLUTION INTRAVENOUS at 12:05

## 2025-01-29 RX ADMIN — SODIUM CHLORIDE 7.5 MG/HR: 9 INJECTION, SOLUTION INTRAVENOUS at 10:18

## 2025-01-29 RX ADMIN — CHLORHEXIDINE GLUCONATE 15 ML: 1.2 SOLUTION ORAL at 20:40

## 2025-01-29 RX ADMIN — SODIUM CHLORIDE, PRESERVATIVE FREE 10 ML: 5 INJECTION INTRAVENOUS at 20:40

## 2025-01-29 RX ADMIN — SODIUM CHLORIDE 15 MG/HR: 9 INJECTION, SOLUTION INTRAVENOUS at 07:43

## 2025-01-29 RX ADMIN — FENTANYL CITRATE 25 MCG: 50 INJECTION, SOLUTION INTRAMUSCULAR; INTRAVENOUS at 19:46

## 2025-01-29 RX ADMIN — Medication 10 MG: at 21:36

## 2025-01-29 RX ADMIN — ACETAMINOPHEN 650 MG: 325 TABLET ORAL at 12:43

## 2025-01-29 RX ADMIN — HYDRALAZINE HYDROCHLORIDE 10 MG: 20 INJECTION INTRAMUSCULAR; INTRAVENOUS at 22:23

## 2025-01-29 RX ADMIN — SODIUM CHLORIDE 10 MG/HR: 9 INJECTION, SOLUTION INTRAVENOUS at 02:48

## 2025-01-29 RX ADMIN — FENTANYL CITRATE 25 MCG: 50 INJECTION, SOLUTION INTRAMUSCULAR; INTRAVENOUS at 17:55

## 2025-01-29 RX ADMIN — SODIUM CHLORIDE 15 MG/HR: 9 INJECTION, SOLUTION INTRAVENOUS at 05:50

## 2025-01-29 RX ADMIN — Medication 10 MG: at 02:21

## 2025-01-29 RX ADMIN — AMLODIPINE BESYLATE 5 MG: 10 TABLET ORAL at 17:39

## 2025-01-29 RX ADMIN — CHLORHEXIDINE GLUCONATE 15 ML: 1.2 SOLUTION ORAL at 08:04

## 2025-01-29 RX ADMIN — POTASSIUM BICARBONATE 30 MEQ: 782 TABLET, EFFERVESCENT ORAL at 08:03

## 2025-01-29 ASSESSMENT — PULMONARY FUNCTION TESTS
PIF_VALUE: 29
PIF_VALUE: 28
PIF_VALUE: 25
PIF_VALUE: 19
PIF_VALUE: 29
PIF_VALUE: 16
PIF_VALUE: 26
PIF_VALUE: 25
PIF_VALUE: 27
PIF_VALUE: 28
PIF_VALUE: 27
PIF_VALUE: 25
PIF_VALUE: 33
PIF_VALUE: 27
PIF_VALUE: 25
PIF_VALUE: 26
PIF_VALUE: 25
PIF_VALUE: 28
PIF_VALUE: 27
PIF_VALUE: 25
PIF_VALUE: 21
PIF_VALUE: 29
PIF_VALUE: 30
PIF_VALUE: 30
PIF_VALUE: 26
PIF_VALUE: 28
PIF_VALUE: 23
PIF_VALUE: 25
PIF_VALUE: 28
PIF_VALUE: 25
PIF_VALUE: 26
PIF_VALUE: 26
PIF_VALUE: 24
PIF_VALUE: 26
PIF_VALUE: 25
PIF_VALUE: 32
PIF_VALUE: 28
PIF_VALUE: 28
PIF_VALUE: 29

## 2025-01-29 NOTE — OP NOTE
Operative Note      Patient: Leland Eduardo II  YOB: 1960  MRN: [id number]    Date of Procedure: 1/28/2025    Pre-Op Diagnosis Codes:      * H/O intracranial hemorrhage [Z86.79]    Post-Op Diagnosis: Same       Procedure(s):  E0 CRANIOTOMY HEMATOMA EVACUATION    Surgeon(s):  Ronal Harrell MD    Assistant:   * No surgical staff found *    Anesthesia: General    Estimated Blood Loss (mL): less than 50     Complications: None    Specimens:   * No specimens in log *    Implants:  Implant Name Type Inv. Item Serial No.  Lot No. LRB No. Used Action   GRAFT DURA [id number] ULTRAPURE DURAGN + EA - [id number]  GRAFT DURA [id number] ULTRAPURE DURAGN + EA  [employer] [id number] Left 1 Implanted   SCREW BNE L4MM DIA1.5MM CRSS PIN SELF DRL 5/EA - [id number]  SCREW BNE L4MM DIA1.5MM CRSS PIN SELF DRL 5/EA  [employer]  Left 9 Implanted   COVER BUR H 14MM W TAB - [id number]  COVER BUR H 14MM W TAB  [employer]WD  Left 2 Implanted         Drains:   NG/OG/NJ/NE Tube Center mouth (Active)   Surrounding Skin Clean, dry & intact 01/28/25 1730   Securement device Tape 01/28/25 1730   Status Clamped 01/28/25 1730   Placement Verified X-Ray (Initial) 01/28/25 1730       Urinary Catheter 01/28/25 (Active)   Catheter Indications Need for fluid volume management of the critically ill patient in a critical care setting 01/28/25 1730   Site Assessment No urethral drainage 01/28/25 1730   Urine Color Yellow 01/28/25 1730   Urine Appearance Clear 01/28/25 1730   Collection Container Standard 01/28/25 1730   Securement Method Leg strap 01/28/25 1730   Catheter Care  Soap and water 01/28/25 1730   Catheter Best Practices  Drainage tube clipped to bed;Catheter secured to thigh;Tamper seal intact;Bag below bladder;Bag not on floor 01/28/25 1730   Status Draining 01/28/25 1730   Output (mL) 200 mL 01/28/25 1800       Findings:  Infection Present At Time Of Surgery (PATOS)

## 2025-01-30 ENCOUNTER — APPOINTMENT (OUTPATIENT)
Dept: GENERAL RADIOLOGY | Age: 65
DRG: 023 | End: 2025-01-30
Payer: MEDICARE

## 2025-01-30 LAB
ANION GAP SERPL CALCULATED.3IONS-SCNC: 10 MMOL/L (ref 9–16)
BASOPHILS # BLD: <0.03 K/UL (ref 0–0.2)
BASOPHILS NFR BLD: 0 % (ref 0–2)
BUN SERPL-MCNC: 9 MG/DL (ref 8–23)
CALCIUM SERPL-MCNC: 9.2 MG/DL (ref 8.6–10.4)
CHLORIDE SERPL-SCNC: 104 MMOL/L (ref 98–107)
CO2 SERPL-SCNC: 22 MMOL/L (ref 20–31)
CREAT SERPL-MCNC: 0.6 MG/DL (ref 0.7–1.2)
EOSINOPHIL # BLD: <0.03 K/UL (ref 0–0.44)
EOSINOPHILS RELATIVE PERCENT: 0 % (ref 1–4)
ERYTHROCYTE [DISTWIDTH] IN BLOOD BY AUTOMATED COUNT: 12.3 % (ref 11.8–14.4)
FIO2: 30
GFR, ESTIMATED: >90 ML/MIN/1.73M2
GLUCOSE BLD-MCNC: 172 MG/DL (ref 74–100)
GLUCOSE SERPL-MCNC: 168 MG/DL (ref 74–99)
HCT VFR BLD AUTO: 39.9 % (ref 40.7–50.3)
HGB BLD-MCNC: 13.6 G/DL (ref 13–17)
IMM GRANULOCYTES # BLD AUTO: 0.08 K/UL (ref 0–0.3)
IMM GRANULOCYTES NFR BLD: 1 %
LYMPHOCYTES NFR BLD: 0.74 K/UL (ref 1.1–3.7)
LYMPHOCYTES RELATIVE PERCENT: 5 % (ref 24–43)
MAGNESIUM SERPL-MCNC: 1.8 MG/DL (ref 1.6–2.4)
MCH RBC QN AUTO: 31 PG (ref 25.2–33.5)
MCHC RBC AUTO-ENTMCNC: 34.1 G/DL (ref 28.4–34.8)
MCV RBC AUTO: 90.9 FL (ref 82.6–102.9)
MODE: ABNORMAL
MONOCYTES NFR BLD: 1.1 K/UL (ref 0.1–1.2)
MONOCYTES NFR BLD: 7 % (ref 3–12)
NEUTROPHILS NFR BLD: 87 % (ref 36–65)
NEUTS SEG NFR BLD: 13.09 K/UL (ref 1.5–8.1)
NRBC BLD-RTO: 0 PER 100 WBC
PATIENT TEMP: 37.4
PLATELET # BLD AUTO: 180 K/UL (ref 138–453)
PMV BLD AUTO: 10.1 FL (ref 8.1–13.5)
POC HCO3: 24.5 MMOL/L (ref 21–28)
POC O2 SATURATION: 99.1 % (ref 94–98)
POC PCO2 TEMP: 35.2 MM HG
POC PCO2: 34.6 MM HG (ref 35–48)
POC PH TEMP: 7.45
POC PH: 7.46 (ref 7.35–7.45)
POC PO2 TEMP: 129.5 MM HG
POC PO2: 126.9 MM HG (ref 83–108)
POSITIVE BASE EXCESS, ART: 1.1 MMOL/L (ref 0–3)
POTASSIUM SERPL-SCNC: 3.3 MMOL/L (ref 3.7–5.3)
RBC # BLD AUTO: 4.39 M/UL (ref 4.21–5.77)
SAMPLE SITE: ABNORMAL
SODIUM SERPL-SCNC: 136 MMOL/L (ref 136–145)
WBC OTHER # BLD: 15 K/UL (ref 3.5–11.3)

## 2025-01-30 PROCEDURE — 74018 RADEX ABDOMEN 1 VIEW: CPT

## 2025-01-30 PROCEDURE — 2580000003 HC RX 258: Performed by: NURSE PRACTITIONER

## 2025-01-30 PROCEDURE — 6370000000 HC RX 637 (ALT 250 FOR IP)

## 2025-01-30 PROCEDURE — 6370000000 HC RX 637 (ALT 250 FOR IP): Performed by: NURSE PRACTITIONER

## 2025-01-30 PROCEDURE — 6360000002 HC RX W HCPCS

## 2025-01-30 PROCEDURE — 82803 BLOOD GASES ANY COMBINATION: CPT

## 2025-01-30 PROCEDURE — 83735 ASSAY OF MAGNESIUM: CPT

## 2025-01-30 PROCEDURE — 2500000003 HC RX 250 WO HCPCS: Performed by: NURSE PRACTITIONER

## 2025-01-30 PROCEDURE — 6360000002 HC RX W HCPCS: Performed by: NURSE PRACTITIONER

## 2025-01-30 PROCEDURE — 80048 BASIC METABOLIC PNL TOTAL CA: CPT

## 2025-01-30 PROCEDURE — 85025 COMPLETE CBC W/AUTO DIFF WBC: CPT

## 2025-01-30 PROCEDURE — 99291 CRITICAL CARE FIRST HOUR: CPT | Performed by: STUDENT IN AN ORGANIZED HEALTH CARE EDUCATION/TRAINING PROGRAM

## 2025-01-30 PROCEDURE — 37799 UNLISTED PX VASCULAR SURGERY: CPT

## 2025-01-30 PROCEDURE — 94761 N-INVAS EAR/PLS OXIMETRY MLT: CPT

## 2025-01-30 PROCEDURE — 2700000000 HC OXYGEN THERAPY PER DAY

## 2025-01-30 PROCEDURE — 2000000000 HC ICU R&B

## 2025-01-30 PROCEDURE — 99233 SBSQ HOSP IP/OBS HIGH 50: CPT | Performed by: PSYCHIATRY & NEUROLOGY

## 2025-01-30 PROCEDURE — 94003 VENT MGMT INPAT SUBQ DAY: CPT

## 2025-01-30 PROCEDURE — 71045 X-RAY EXAM CHEST 1 VIEW: CPT

## 2025-01-30 PROCEDURE — 82947 ASSAY GLUCOSE BLOOD QUANT: CPT

## 2025-01-30 RX ORDER — AMLODIPINE BESYLATE 10 MG/1
10 TABLET ORAL DAILY
Status: DISCONTINUED | OUTPATIENT
Start: 2025-01-31 | End: 2025-01-31

## 2025-01-30 RX ORDER — MAGNESIUM SULFATE IN WATER 40 MG/ML
2000 INJECTION, SOLUTION INTRAVENOUS ONCE
Status: COMPLETED | OUTPATIENT
Start: 2025-01-30 | End: 2025-01-30

## 2025-01-30 RX ADMIN — SODIUM CHLORIDE: 9 INJECTION, SOLUTION INTRAVENOUS at 08:48

## 2025-01-30 RX ADMIN — SODIUM CHLORIDE 10 MG/HR: 9 INJECTION, SOLUTION INTRAVENOUS at 09:35

## 2025-01-30 RX ADMIN — CHLORHEXIDINE GLUCONATE 15 ML: 1.2 SOLUTION ORAL at 20:42

## 2025-01-30 RX ADMIN — SODIUM CHLORIDE 5 MG/HR: 9 INJECTION, SOLUTION INTRAVENOUS at 00:41

## 2025-01-30 RX ADMIN — SODIUM CHLORIDE, PRESERVATIVE FREE 5 ML: 5 INJECTION INTRAVENOUS at 20:42

## 2025-01-30 RX ADMIN — SODIUM CHLORIDE, PRESERVATIVE FREE 10 ML: 5 INJECTION INTRAVENOUS at 09:17

## 2025-01-30 RX ADMIN — MAGNESIUM SULFATE HEPTAHYDRATE 2000 MG: 40 INJECTION, SOLUTION INTRAVENOUS at 08:52

## 2025-01-30 RX ADMIN — AMLODIPINE BESYLATE 5 MG: 10 TABLET ORAL at 08:45

## 2025-01-30 RX ADMIN — SODIUM CHLORIDE 7.5 MG/HR: 9 INJECTION, SOLUTION INTRAVENOUS at 21:52

## 2025-01-30 RX ADMIN — FENTANYL CITRATE 25 MCG: 50 INJECTION, SOLUTION INTRAMUSCULAR; INTRAVENOUS at 03:35

## 2025-01-30 RX ADMIN — SODIUM CHLORIDE 7 MG/HR: 9 INJECTION, SOLUTION INTRAVENOUS at 18:23

## 2025-01-30 RX ADMIN — POTASSIUM BICARBONATE 40 MEQ: 782 TABLET, EFFERVESCENT ORAL at 09:37

## 2025-01-30 RX ADMIN — ACETAMINOPHEN 650 MG: 650 SUPPOSITORY RECTAL at 18:05

## 2025-01-30 RX ADMIN — Medication 10 MG: at 00:17

## 2025-01-30 RX ADMIN — SODIUM CHLORIDE 6 MG/HR: 9 INJECTION, SOLUTION INTRAVENOUS at 15:00

## 2025-01-30 RX ADMIN — CHLORHEXIDINE GLUCONATE 15 ML: 1.2 SOLUTION ORAL at 08:46

## 2025-01-30 RX ADMIN — SODIUM CHLORIDE 10 MG/HR: 9 INJECTION, SOLUTION INTRAVENOUS at 11:57

## 2025-01-30 ASSESSMENT — PAIN SCALES - GENERAL
PAINLEVEL_OUTOF10: 0
PAINLEVEL_OUTOF10: 6
PAINLEVEL_OUTOF10: 0

## 2025-01-30 ASSESSMENT — PULMONARY FUNCTION TESTS: PIF_VALUE: 26

## 2025-01-30 NOTE — CARE COORDINATION
Case Management Assessment  Initial Evaluation    Date/Time of Evaluation: 1/30/2025 9:30 AM  Assessment Completed by: Scarlet Cordero    If patient is discharged prior to next notation, then this note serves as note for discharge by case management.    Patient Name: Leland Eduardo II                   YOB: 1960  Diagnosis: Intracranial hemorrhage (HCC) [I62.9]  Hemorrhagic stroke (HCC) [I61.9]  Hypertensive emergency [I16.1]                   Date / Time: 1/28/2025  3:33 PM    Patient Admission Status: Inpatient   Readmission Risk (Low < 19, Mod (19-27), High > 27): Readmission Risk Score: 9.2    Current PCP: No primary care provider on file.  PCP verified by CM? (P) Yes    Chart Reviewed: Yes      History Provided by: (P) Significant Other  Patient Orientation: (P) Unable to Assess (Pt intubated/ventilated)    Patient Cognition: (P) Other (see comment)    Hospitalization in the last 30 days (Readmission):  No    If yes, Readmission Assessment in CM Navigator will be completed.    Advance Directives:      Code Status: Full Code   Patient's Primary Decision Maker is:        Discharge Planning:    Patient lives with:   Type of Home: (P) Long-Term Care, Acute Rehab (TBD after extubation and able to do therapies, will need to follow progress)  Primary Care Giver: (P) Self  Patient Support Systems include: (P) Spouse/Significant Other   Current Financial resources: (P) Medicare  Current community resources:    Current services prior to admission: (P) None            Current DME:              Type of Home Care services:       ADLS  Prior functional level: (P) Independent in ADLs/IADLs  Current functional level: (P) Assistance with the following:    PT AM-PAC:   /24  OT AM-PAC:   /24    Family can provide assistance at DC: (P) Yes  Would you like Case Management to discuss the discharge plan with any other family members/significant others, and if so, who? (P) Yes (spouse)  Plans to Return to Present

## 2025-01-30 NOTE — CARE COORDINATION
Pts spouse at bedtime, has questions regarding LTAC vs Rehab choices, all questions answered and choices provided, [facility] and [facility] for [facility] choice, referrals sent

## 2025-01-31 ENCOUNTER — APPOINTMENT (OUTPATIENT)
Dept: GENERAL RADIOLOGY | Age: 65
DRG: 023 | End: 2025-01-31
Payer: MEDICARE

## 2025-01-31 ENCOUNTER — APPOINTMENT (OUTPATIENT)
Dept: VASCULAR LAB | Age: 65
DRG: 023 | End: 2025-01-31
Payer: MEDICARE

## 2025-01-31 LAB
ANION GAP SERPL CALCULATED.3IONS-SCNC: 11 MMOL/L (ref 9–16)
BACTERIA URNS QL MICRO: ABNORMAL
BASOPHILS # BLD: <0.03 K/UL (ref 0–0.2)
BASOPHILS NFR BLD: 0 % (ref 0–2)
BILIRUB UR QL STRIP: NEGATIVE
BUN SERPL-MCNC: 11 MG/DL (ref 8–23)
CALCIUM SERPL-MCNC: 9.1 MG/DL (ref 8.6–10.4)
CHLORIDE SERPL-SCNC: 108 MMOL/L (ref 98–107)
CLARITY UR: CLEAR
CO2 SERPL-SCNC: 22 MMOL/L (ref 20–31)
COLOR UR: YELLOW
CREAT SERPL-MCNC: 0.6 MG/DL (ref 0.7–1.2)
DATE, STOOL #1: NORMAL
EOSINOPHIL # BLD: <0.03 K/UL (ref 0–0.44)
EOSINOPHILS RELATIVE PERCENT: 0 % (ref 1–4)
ERYTHROCYTE [DISTWIDTH] IN BLOOD BY AUTOMATED COUNT: 12.4 % (ref 11.8–14.4)
GFR, ESTIMATED: >90 ML/MIN/1.73M2
GLUCOSE SERPL-MCNC: 119 MG/DL (ref 74–99)
GLUCOSE UR STRIP-MCNC: NEGATIVE MG/DL
HCT VFR BLD AUTO: 41.2 % (ref 40.7–50.3)
HCT VFR BLD AUTO: 45.9 % (ref 40.7–50.3)
HEMOCCULT SP1 STL QL: NEGATIVE
HGB BLD-MCNC: 14.1 G/DL (ref 13–17)
HGB BLD-MCNC: 15.3 G/DL (ref 13–17)
HGB UR QL STRIP.AUTO: ABNORMAL
IMM GRANULOCYTES # BLD AUTO: 0.12 K/UL (ref 0–0.3)
IMM GRANULOCYTES NFR BLD: 1 %
KETONES UR STRIP-MCNC: NEGATIVE MG/DL
LEUKOCYTE ESTERASE UR QL STRIP: ABNORMAL
LYMPHOCYTES NFR BLD: 1.16 K/UL (ref 1.1–3.7)
LYMPHOCYTES RELATIVE PERCENT: 7 % (ref 24–43)
MAGNESIUM SERPL-MCNC: 1.8 MG/DL (ref 1.6–2.4)
MCH RBC QN AUTO: 31.2 PG (ref 25.2–33.5)
MCHC RBC AUTO-ENTMCNC: 34.2 G/DL (ref 28.4–34.8)
MCV RBC AUTO: 91.2 FL (ref 82.6–102.9)
MONOCYTES NFR BLD: 1.34 K/UL (ref 0.1–1.2)
MONOCYTES NFR BLD: 8 % (ref 3–12)
NEUTROPHILS NFR BLD: 84 % (ref 36–65)
NEUTS SEG NFR BLD: 13.79 K/UL (ref 1.5–8.1)
NITRITE UR QL STRIP: NEGATIVE
NRBC BLD-RTO: 0 PER 100 WBC
PH UR STRIP: 8.5 [PH] (ref 5–8)
PLATELET # BLD AUTO: 187 K/UL (ref 138–453)
PMV BLD AUTO: 9.8 FL (ref 8.1–13.5)
POTASSIUM SERPL-SCNC: 3.2 MMOL/L (ref 3.7–5.3)
PROCALCITONIN SERPL-MCNC: 0.07 NG/ML (ref 0–0.09)
PROT UR STRIP-MCNC: ABNORMAL MG/DL
RBC # BLD AUTO: 4.52 M/UL (ref 4.21–5.77)
RBC #/AREA URNS HPF: ABNORMAL /HPF (ref 0–4)
SODIUM SERPL-SCNC: 141 MMOL/L (ref 136–145)
SP GR UR STRIP: 1.01 (ref 1–1.03)
TIME, STOOL #1: 1009
UROBILINOGEN UR STRIP-ACNC: NORMAL EU/DL (ref 0–1)
WBC #/AREA URNS HPF: ABNORMAL /HPF (ref 0–5)
WBC OTHER # BLD: 16.4 K/UL (ref 3.5–11.3)

## 2025-01-31 PROCEDURE — 92610 EVALUATE SWALLOWING FUNCTION: CPT

## 2025-01-31 PROCEDURE — 2700000000 HC OXYGEN THERAPY PER DAY

## 2025-01-31 PROCEDURE — 83735 ASSAY OF MAGNESIUM: CPT

## 2025-01-31 PROCEDURE — 6360000002 HC RX W HCPCS: Performed by: NURSE PRACTITIONER

## 2025-01-31 PROCEDURE — 87040 BLOOD CULTURE FOR BACTERIA: CPT

## 2025-01-31 PROCEDURE — 97167 OT EVAL HIGH COMPLEX 60 MIN: CPT

## 2025-01-31 PROCEDURE — 2500000003 HC RX 250 WO HCPCS: Performed by: NURSE PRACTITIONER

## 2025-01-31 PROCEDURE — 97112 NEUROMUSCULAR REEDUCATION: CPT

## 2025-01-31 PROCEDURE — 82270 OCCULT BLOOD FECES: CPT

## 2025-01-31 PROCEDURE — 2000000000 HC ICU R&B

## 2025-01-31 PROCEDURE — 85025 COMPLETE CBC W/AUTO DIFF WBC: CPT

## 2025-01-31 PROCEDURE — 85014 HEMATOCRIT: CPT

## 2025-01-31 PROCEDURE — 80048 BASIC METABOLIC PNL TOTAL CA: CPT

## 2025-01-31 PROCEDURE — 94761 N-INVAS EAR/PLS OXIMETRY MLT: CPT

## 2025-01-31 PROCEDURE — 85018 HEMOGLOBIN: CPT

## 2025-01-31 PROCEDURE — 71045 X-RAY EXAM CHEST 1 VIEW: CPT

## 2025-01-31 PROCEDURE — 84145 PROCALCITONIN (PCT): CPT

## 2025-01-31 PROCEDURE — 97162 PT EVAL MOD COMPLEX 30 MIN: CPT

## 2025-01-31 PROCEDURE — 97530 THERAPEUTIC ACTIVITIES: CPT

## 2025-01-31 PROCEDURE — 2580000003 HC RX 258

## 2025-01-31 PROCEDURE — 81001 URINALYSIS AUTO W/SCOPE: CPT

## 2025-01-31 PROCEDURE — 36415 COLL VENOUS BLD VENIPUNCTURE: CPT

## 2025-01-31 PROCEDURE — 2580000003 HC RX 258: Performed by: NURSE PRACTITIONER

## 2025-01-31 PROCEDURE — 99291 CRITICAL CARE FIRST HOUR: CPT | Performed by: STUDENT IN AN ORGANIZED HEALTH CARE EDUCATION/TRAINING PROGRAM

## 2025-01-31 PROCEDURE — 6370000000 HC RX 637 (ALT 250 FOR IP): Performed by: NURSE PRACTITIONER

## 2025-01-31 PROCEDURE — 99233 SBSQ HOSP IP/OBS HIGH 50: CPT | Performed by: PSYCHIATRY & NEUROLOGY

## 2025-01-31 PROCEDURE — 93970 EXTREMITY STUDY: CPT

## 2025-01-31 PROCEDURE — 97535 SELF CARE MNGMENT TRAINING: CPT

## 2025-01-31 RX ORDER — ENOXAPARIN SODIUM 100 MG/ML
40 INJECTION SUBCUTANEOUS DAILY
Status: DISCONTINUED | OUTPATIENT
Start: 2025-01-31 | End: 2025-02-18 | Stop reason: HOSPADM

## 2025-01-31 RX ORDER — MAGNESIUM SULFATE IN WATER 40 MG/ML
2000 INJECTION, SOLUTION INTRAVENOUS ONCE
Status: COMPLETED | OUTPATIENT
Start: 2025-01-31 | End: 2025-02-01

## 2025-01-31 RX ORDER — LISINOPRIL 10 MG/1
10 TABLET ORAL DAILY
Status: DISCONTINUED | OUTPATIENT
Start: 2025-01-31 | End: 2025-02-03

## 2025-01-31 RX ORDER — AMLODIPINE BESYLATE 10 MG/1
10 TABLET ORAL DAILY
Status: DISCONTINUED | OUTPATIENT
Start: 2025-01-31 | End: 2025-02-18 | Stop reason: HOSPADM

## 2025-01-31 RX ADMIN — POTASSIUM BICARBONATE 40 MEQ: 782 TABLET, EFFERVESCENT ORAL at 12:48

## 2025-01-31 RX ADMIN — ACETAMINOPHEN 650 MG: 325 TABLET ORAL at 21:14

## 2025-01-31 RX ADMIN — HYDRALAZINE HYDROCHLORIDE 10 MG: 20 INJECTION INTRAMUSCULAR; INTRAVENOUS at 21:07

## 2025-01-31 RX ADMIN — POTASSIUM BICARBONATE 40 MEQ: 782 TABLET, EFFERVESCENT ORAL at 07:17

## 2025-01-31 RX ADMIN — LISINOPRIL 10 MG: 20 TABLET ORAL at 07:17

## 2025-01-31 RX ADMIN — ACETAMINOPHEN 650 MG: 325 TABLET ORAL at 03:11

## 2025-01-31 RX ADMIN — HYDRALAZINE HYDROCHLORIDE 10 MG: 20 INJECTION INTRAMUSCULAR; INTRAVENOUS at 23:08

## 2025-01-31 RX ADMIN — ENOXAPARIN SODIUM 40 MG: 100 INJECTION SUBCUTANEOUS at 12:47

## 2025-01-31 RX ADMIN — SODIUM CHLORIDE 7.5 MG/HR: 9 INJECTION, SOLUTION INTRAVENOUS at 01:47

## 2025-01-31 RX ADMIN — SODIUM CHLORIDE: 9 INJECTION, SOLUTION INTRAVENOUS at 12:44

## 2025-01-31 RX ADMIN — AMLODIPINE BESYLATE 10 MG: 10 TABLET ORAL at 07:17

## 2025-01-31 RX ADMIN — MAGNESIUM SULFATE HEPTAHYDRATE 2000 MG: 40 INJECTION, SOLUTION INTRAVENOUS at 12:47

## 2025-01-31 RX ADMIN — SODIUM CHLORIDE, PRESERVATIVE FREE 5 ML: 5 INJECTION INTRAVENOUS at 09:09

## 2025-01-31 RX ADMIN — SODIUM CHLORIDE 5 MG/HR: 9 INJECTION, SOLUTION INTRAVENOUS at 05:52

## 2025-01-31 RX ADMIN — SODIUM CHLORIDE, PRESERVATIVE FREE 10 ML: 5 INJECTION INTRAVENOUS at 22:00

## 2025-01-31 RX ADMIN — CHLORHEXIDINE GLUCONATE 15 ML: 1.2 SOLUTION ORAL at 09:09

## 2025-01-31 NOTE — SIGNIFICANT EVENT
Patient had a dark brown to black appearing bowel movement this morning.  Hgb 14.1.  Stool occult negative.  However, patient had 2 more bowel movements this afternoon which nurse states were black and tarry and had the smell of a GI bleed.  Patient was already cleaned up when I arrived at bedside so I did not see the stool myself.  Stool occult blood #2 ordered.  Recheck H&H.  Start Protonix 40mg IV QD empirically.      ADOLPH Momin - CNP  Neuro Critical Care  01/31/25 3:24 PM

## 2025-02-01 ENCOUNTER — APPOINTMENT (OUTPATIENT)
Dept: GENERAL RADIOLOGY | Age: 65
DRG: 023 | End: 2025-02-01
Payer: MEDICARE

## 2025-02-01 PROBLEM — J69.0 ASPIRATION PNEUMONIA (HCC): Status: ACTIVE | Noted: 2025-02-01

## 2025-02-01 LAB
ANION GAP SERPL CALCULATED.3IONS-SCNC: 16 MMOL/L (ref 9–16)
BASOPHILS # BLD: 0 K/UL (ref 0–0.2)
BASOPHILS NFR BLD: 0 % (ref 0–2)
BUN SERPL-MCNC: 14 MG/DL (ref 8–23)
CALCIUM SERPL-MCNC: 9.3 MG/DL (ref 8.6–10.4)
CHLORIDE SERPL-SCNC: 108 MMOL/L (ref 98–107)
CO2 SERPL-SCNC: 18 MMOL/L (ref 20–31)
CREAT SERPL-MCNC: 0.7 MG/DL (ref 0.7–1.2)
ECHO BSA: 2.1 M2
EOSINOPHIL # BLD: 0 K/UL (ref 0–0.4)
EOSINOPHILS RELATIVE PERCENT: 0 % (ref 1–4)
ERYTHROCYTE [DISTWIDTH] IN BLOOD BY AUTOMATED COUNT: 12.4 % (ref 11.8–14.4)
GFR, ESTIMATED: >90 ML/MIN/1.73M2
GLUCOSE SERPL-MCNC: 112 MG/DL (ref 74–99)
HCT VFR BLD AUTO: 47.5 % (ref 40.7–50.3)
HGB BLD-MCNC: 15.6 G/DL (ref 13–17)
IMM GRANULOCYTES # BLD AUTO: 0.22 K/UL (ref 0–0.3)
IMM GRANULOCYTES NFR BLD: 1 %
LYMPHOCYTES NFR BLD: 2.18 K/UL (ref 1–4.8)
LYMPHOCYTES RELATIVE PERCENT: 10 % (ref 24–44)
MAGNESIUM SERPL-MCNC: 2.1 MG/DL (ref 1.6–2.4)
MCH RBC QN AUTO: 30.8 PG (ref 25.2–33.5)
MCHC RBC AUTO-ENTMCNC: 32.8 G/DL (ref 28.4–34.8)
MCV RBC AUTO: 93.9 FL (ref 82.6–102.9)
MONOCYTES NFR BLD: 10 % (ref 1–7)
MONOCYTES NFR BLD: 2.18 K/UL (ref 0.1–0.8)
MORPHOLOGY: NORMAL
NEUTROPHILS NFR BLD: 79 % (ref 36–66)
NEUTS SEG NFR BLD: 17.22 K/UL (ref 1.8–7.7)
NRBC BLD-RTO: 0 PER 100 WBC
PLATELET # BLD AUTO: 245 K/UL (ref 138–453)
PMV BLD AUTO: 9.8 FL (ref 8.1–13.5)
POTASSIUM SERPL-SCNC: 3.6 MMOL/L (ref 3.7–5.3)
RBC # BLD AUTO: 5.06 M/UL (ref 4.21–5.77)
SODIUM SERPL-SCNC: 142 MMOL/L (ref 136–145)
WBC OTHER # BLD: 21.8 K/UL (ref 3.5–11.3)

## 2025-02-01 PROCEDURE — 83735 ASSAY OF MAGNESIUM: CPT

## 2025-02-01 PROCEDURE — APPSS45 APP SPLIT SHARED TIME 31-45 MINUTES: Performed by: REGISTERED NURSE

## 2025-02-01 PROCEDURE — 6360000002 HC RX W HCPCS

## 2025-02-01 PROCEDURE — 85025 COMPLETE CBC W/AUTO DIFF WBC: CPT

## 2025-02-01 PROCEDURE — 2580000003 HC RX 258: Performed by: NURSE PRACTITIONER

## 2025-02-01 PROCEDURE — 93970 EXTREMITY STUDY: CPT | Performed by: SURGERY

## 2025-02-01 PROCEDURE — 99233 SBSQ HOSP IP/OBS HIGH 50: CPT | Performed by: PSYCHIATRY & NEUROLOGY

## 2025-02-01 PROCEDURE — 71045 X-RAY EXAM CHEST 1 VIEW: CPT

## 2025-02-01 PROCEDURE — 99291 CRITICAL CARE FIRST HOUR: CPT | Performed by: STUDENT IN AN ORGANIZED HEALTH CARE EDUCATION/TRAINING PROGRAM

## 2025-02-01 PROCEDURE — 36415 COLL VENOUS BLD VENIPUNCTURE: CPT

## 2025-02-01 PROCEDURE — 2500000003 HC RX 250 WO HCPCS: Performed by: NURSE PRACTITIONER

## 2025-02-01 PROCEDURE — 6370000000 HC RX 637 (ALT 250 FOR IP): Performed by: NURSE PRACTITIONER

## 2025-02-01 PROCEDURE — 2580000003 HC RX 258

## 2025-02-01 PROCEDURE — 6370000000 HC RX 637 (ALT 250 FOR IP)

## 2025-02-01 PROCEDURE — 2000000000 HC ICU R&B

## 2025-02-01 PROCEDURE — 80048 BASIC METABOLIC PNL TOTAL CA: CPT

## 2025-02-01 PROCEDURE — 6360000002 HC RX W HCPCS: Performed by: NURSE PRACTITIONER

## 2025-02-01 RX ORDER — POTASSIUM CHLORIDE 7.45 MG/ML
10 INJECTION INTRAVENOUS
Status: DISCONTINUED | OUTPATIENT
Start: 2025-02-01 | End: 2025-02-01

## 2025-02-01 RX ADMIN — PIPERACILLIN AND TAZOBACTAM 3375 MG: 3; .375 INJECTION, POWDER, LYOPHILIZED, FOR SOLUTION INTRAVENOUS at 23:27

## 2025-02-01 RX ADMIN — SODIUM CHLORIDE, PRESERVATIVE FREE 10 ML: 5 INJECTION INTRAVENOUS at 08:46

## 2025-02-01 RX ADMIN — ACETAMINOPHEN 650 MG: 325 TABLET ORAL at 21:05

## 2025-02-01 RX ADMIN — SODIUM CHLORIDE: 9 INJECTION, SOLUTION INTRAVENOUS at 04:38

## 2025-02-01 RX ADMIN — PIPERACILLIN AND TAZOBACTAM 3375 MG: 3; .375 INJECTION, POWDER, LYOPHILIZED, FOR SOLUTION INTRAVENOUS at 08:13

## 2025-02-01 RX ADMIN — AMLODIPINE BESYLATE 10 MG: 10 TABLET ORAL at 08:46

## 2025-02-01 RX ADMIN — ACETAMINOPHEN 650 MG: 325 TABLET ORAL at 02:17

## 2025-02-01 RX ADMIN — SODIUM CHLORIDE, PRESERVATIVE FREE 10 ML: 5 INJECTION INTRAVENOUS at 21:09

## 2025-02-01 RX ADMIN — POTASSIUM BICARBONATE 40 MEQ: 782 TABLET, EFFERVESCENT ORAL at 08:46

## 2025-02-01 RX ADMIN — PIPERACILLIN AND TAZOBACTAM 3375 MG: 3; .375 INJECTION, POWDER, LYOPHILIZED, FOR SOLUTION INTRAVENOUS at 16:11

## 2025-02-01 RX ADMIN — ACETAMINOPHEN 650 MG: 325 TABLET ORAL at 08:45

## 2025-02-01 RX ADMIN — LISINOPRIL 10 MG: 20 TABLET ORAL at 08:46

## 2025-02-01 RX ADMIN — SODIUM CHLORIDE, PRESERVATIVE FREE 40 MG: 5 INJECTION INTRAVENOUS at 08:45

## 2025-02-01 RX ADMIN — ENOXAPARIN SODIUM 40 MG: 100 INJECTION SUBCUTANEOUS at 08:45

## 2025-02-01 NOTE — DISCHARGE INSTRUCTIONS
should be removed about 2 weeks after surgery. If they are not removed please call the clinic to have them removed.  It is OK to shower 3-4 days after surgery. Let water run over the incision. Gently pat the incision dry with a clean towel, do not rub. Leave incision site open to air.   Do not apply ointments or lotions to the incision site.    Pain Management:   Do not take NSAID medications (Ibuprofen, Naprosyn, etc.) or Parra-2 inhibitors (Celebrex, etc.) for 2 weeks following surgery.   You will be given a prescription for pain medication. Our hope is that you will eventually be weaned off all pain medications.   Try not to take the pain medicine unless you need to. If you feel that you do not need something that strong, you may use regular or extra-strength Tylenol instead.   DO NOT drink alcohol, drive or operate heavy machinery while taking your pain medications.   Notify the office if your pain is not controlled or you need a medication refill before your appointment.     YOU SHOULD CALL THE OFFICE [phone] IF YOU HAVE ANY OF THE FOLLOWING:   Severe or worsening headaches.   Ongoing nausea and/or vomiting.   If you notice any signs of infection such as bleeding, redness, swelling, tenderness, odor, drainage or opening of the incision. Please check your incisions twice daily.   Fevers greater than 101.5 degrees   Clear fluid leaking from the incision.   Flu-like symptoms, chills, shakes, chest pain, shortness of breath, nausea, vomiting, diarrhea   Changes in mental status such as confusion, slurred speech, increased sleepiness.   Any new neurologic sensory or motor deficits (weakness, numbness)   Seizures   Leg swelling or calf tenderness     *If you are unable to contact someone at the office and your symptoms persist or increase, call 911 or go to the emergency department.

## 2025-02-01 NOTE — CARE COORDINATION
Met w/ wife and discussed need for SNF choices as back up plan. Has the list and left before giving me choices and did not leave list in room as requested.  Wants [facility] 1st choice.

## 2025-02-02 LAB
ANION GAP SERPL CALCULATED.3IONS-SCNC: 12 MMOL/L (ref 9–16)
BASOPHILS # BLD: <0.03 K/UL (ref 0–0.2)
BASOPHILS NFR BLD: 0 % (ref 0–2)
BUN SERPL-MCNC: 15 MG/DL (ref 8–23)
CALCIUM SERPL-MCNC: 9.7 MG/DL (ref 8.6–10.4)
CHLORIDE SERPL-SCNC: 111 MMOL/L (ref 98–107)
CO2 SERPL-SCNC: 24 MMOL/L (ref 20–31)
CREAT SERPL-MCNC: 0.8 MG/DL (ref 0.7–1.2)
EOSINOPHIL # BLD: 0.07 K/UL (ref 0–0.44)
EOSINOPHILS RELATIVE PERCENT: 1 % (ref 1–4)
ERYTHROCYTE [DISTWIDTH] IN BLOOD BY AUTOMATED COUNT: 12.4 % (ref 11.8–14.4)
GFR, ESTIMATED: >90 ML/MIN/1.73M2
GLUCOSE SERPL-MCNC: 114 MG/DL (ref 74–99)
HCT VFR BLD AUTO: 46.9 % (ref 40.7–50.3)
HGB BLD-MCNC: 15.2 G/DL (ref 13–17)
IMM GRANULOCYTES # BLD AUTO: 0.04 K/UL (ref 0–0.3)
IMM GRANULOCYTES NFR BLD: 0 %
LYMPHOCYTES NFR BLD: 1.57 K/UL (ref 1.1–3.7)
LYMPHOCYTES RELATIVE PERCENT: 11 % (ref 24–43)
MCH RBC QN AUTO: 30.6 PG (ref 25.2–33.5)
MCHC RBC AUTO-ENTMCNC: 32.4 G/DL (ref 28.4–34.8)
MCV RBC AUTO: 94.4 FL (ref 82.6–102.9)
MONOCYTES NFR BLD: 1.21 K/UL (ref 0.1–1.2)
MONOCYTES NFR BLD: 8 % (ref 3–12)
NEUTROPHILS NFR BLD: 80 % (ref 36–65)
NEUTS SEG NFR BLD: 11.55 K/UL (ref 1.5–8.1)
NRBC BLD-RTO: 0 PER 100 WBC
PLATELET # BLD AUTO: 261 K/UL (ref 138–453)
PMV BLD AUTO: 9.6 FL (ref 8.1–13.5)
POTASSIUM SERPL-SCNC: 3.1 MMOL/L (ref 3.7–5.3)
RBC # BLD AUTO: 4.97 M/UL (ref 4.21–5.77)
SODIUM SERPL-SCNC: 147 MMOL/L (ref 136–145)
WBC OTHER # BLD: 14.5 K/UL (ref 3.5–11.3)

## 2025-02-02 PROCEDURE — 80048 BASIC METABOLIC PNL TOTAL CA: CPT

## 2025-02-02 PROCEDURE — 6370000000 HC RX 637 (ALT 250 FOR IP): Performed by: NURSE PRACTITIONER

## 2025-02-02 PROCEDURE — 87040 BLOOD CULTURE FOR BACTERIA: CPT

## 2025-02-02 PROCEDURE — 6360000002 HC RX W HCPCS

## 2025-02-02 PROCEDURE — 97535 SELF CARE MNGMENT TRAINING: CPT

## 2025-02-02 PROCEDURE — 85025 COMPLETE CBC W/AUTO DIFF WBC: CPT

## 2025-02-02 PROCEDURE — 2580000003 HC RX 258

## 2025-02-02 PROCEDURE — 94761 N-INVAS EAR/PLS OXIMETRY MLT: CPT

## 2025-02-02 PROCEDURE — 97110 THERAPEUTIC EXERCISES: CPT

## 2025-02-02 PROCEDURE — 97530 THERAPEUTIC ACTIVITIES: CPT

## 2025-02-02 PROCEDURE — 2500000003 HC RX 250 WO HCPCS: Performed by: NURSE PRACTITIONER

## 2025-02-02 PROCEDURE — 6360000002 HC RX W HCPCS: Performed by: NURSE PRACTITIONER

## 2025-02-02 PROCEDURE — 2580000003 HC RX 258: Performed by: NURSE PRACTITIONER

## 2025-02-02 PROCEDURE — 99232 SBSQ HOSP IP/OBS MODERATE 35: CPT | Performed by: STUDENT IN AN ORGANIZED HEALTH CARE EDUCATION/TRAINING PROGRAM

## 2025-02-02 PROCEDURE — 99233 SBSQ HOSP IP/OBS HIGH 50: CPT | Performed by: PSYCHIATRY & NEUROLOGY

## 2025-02-02 PROCEDURE — 36415 COLL VENOUS BLD VENIPUNCTURE: CPT

## 2025-02-02 PROCEDURE — 2060000000 HC ICU INTERMEDIATE R&B

## 2025-02-02 PROCEDURE — 97112 NEUROMUSCULAR REEDUCATION: CPT

## 2025-02-02 PROCEDURE — 6370000000 HC RX 637 (ALT 250 FOR IP)

## 2025-02-02 PROCEDURE — 99222 1ST HOSP IP/OBS MODERATE 55: CPT | Performed by: PSYCHIATRY & NEUROLOGY

## 2025-02-02 RX ORDER — MAGNESIUM SULFATE IN WATER 40 MG/ML
2000 INJECTION, SOLUTION INTRAVENOUS PRN
Status: DISCONTINUED | OUTPATIENT
Start: 2025-02-02 | End: 2025-02-18 | Stop reason: HOSPADM

## 2025-02-02 RX ORDER — POTASSIUM CHLORIDE 7.45 MG/ML
10 INJECTION INTRAVENOUS PRN
Status: DISCONTINUED | OUTPATIENT
Start: 2025-02-02 | End: 2025-02-18 | Stop reason: HOSPADM

## 2025-02-02 RX ORDER — POTASSIUM CHLORIDE 1500 MG/1
40 TABLET, EXTENDED RELEASE ORAL PRN
Status: DISCONTINUED | OUTPATIENT
Start: 2025-02-02 | End: 2025-02-18 | Stop reason: HOSPADM

## 2025-02-02 RX ADMIN — PIPERACILLIN AND TAZOBACTAM 3375 MG: 3; .375 INJECTION, POWDER, LYOPHILIZED, FOR SOLUTION INTRAVENOUS at 23:38

## 2025-02-02 RX ADMIN — PIPERACILLIN AND TAZOBACTAM 3375 MG: 3; .375 INJECTION, POWDER, LYOPHILIZED, FOR SOLUTION INTRAVENOUS at 07:44

## 2025-02-02 RX ADMIN — AMLODIPINE BESYLATE 10 MG: 10 TABLET ORAL at 08:52

## 2025-02-02 RX ADMIN — LISINOPRIL 10 MG: 20 TABLET ORAL at 08:52

## 2025-02-02 RX ADMIN — PIPERACILLIN AND TAZOBACTAM 3375 MG: 3; .375 INJECTION, POWDER, LYOPHILIZED, FOR SOLUTION INTRAVENOUS at 15:30

## 2025-02-02 RX ADMIN — POTASSIUM BICARBONATE 40 MEQ: 782 TABLET, EFFERVESCENT ORAL at 08:52

## 2025-02-02 RX ADMIN — SODIUM CHLORIDE, PRESERVATIVE FREE 10 ML: 5 INJECTION INTRAVENOUS at 08:53

## 2025-02-02 RX ADMIN — HYDRALAZINE HYDROCHLORIDE 10 MG: 20 INJECTION INTRAMUSCULAR; INTRAVENOUS at 05:31

## 2025-02-02 RX ADMIN — SODIUM CHLORIDE, PRESERVATIVE FREE 40 MG: 5 INJECTION INTRAVENOUS at 08:53

## 2025-02-02 RX ADMIN — ENOXAPARIN SODIUM 40 MG: 100 INJECTION SUBCUTANEOUS at 08:52

## 2025-02-02 ASSESSMENT — PAIN SCALES - GENERAL: PAINLEVEL_OUTOF10: 0

## 2025-02-02 NOTE — H&P
[facility] Neurology   IN-PATIENT SERVICE   [facility]    HISTORY AND PHYSICAL EXAMINATION            Date:   2/2/2025  Patient name:  Leland Eduardo II  Date of admission:  1/28/2025  3:33 PM  MRN:   [id number]  Account:  [id number]  YOB: 1960  PCP:    No primary care provider on file.  Room:   [address] [place] [address]  Code Status:    Full Code    Chief Complaint:     Chief Complaint   Patient presents with    Cerebrovascular Accident     History Obtained From:     electronic medical record    History of Present Illness:     Leland Eduardo II is a 64-year-old male with a history of HTN, HLD, CAD (s/p stent), CHF, COPD, and thrombocytopenia who was admitted to the Neuro ICU on 1/28/2025 following new-onset right-sided weakness and decreased alertness. Last known well was at 1415 on 1/28, when he was found by his wife slumped over. EMS found him hypertensive () and he was intubated for airway protection, receiving propofol, fentanyl, and Versed during transport. Upon ED arrival, initial imaging showed a large left basal ganglia/thalamic hemorrhage with significant mass effect (1.1 cm midline shift), IVH, and a 90% stenosis at the left ICA. He was started on a Cardene infusion for BP control (goal SBP <160), and 250cc 3% saline bolus was administered. Neurosurgery performed a craniotomy with hematoma evacuation. Post-op, the patient remained intubated and sedated, with some improvement in imaging and clinical status. He was extubated on 1/30, but developed fever and leukocytosis. Empiric antibiotics (Zosyn) were started on 2/1 with immediate improvement in WBC count. By the morning of 2/2, the patient remained stable, with no acute events, but continues to have dense global aphasia, not following commands. Patient has localized movement in the left upper extremity and bilateral lower extremities, but no movement in the right upper extremity. BP remains within goal range with 
Neurosurgery consulted.  Admitted to the neuro ICU for further management.    Patient care will be discussed with attending, will reevaluate patient along with attending.     PLAN/MEDICAL DECISION MAKING:      NEUROLOGIC:  Large left basal ganglia/thalamus intraparenchymal hemorrhage with intraventricular extension, surrounding vasogenic edema and midline shift (brain compression)  Mild obstructive hydrocephalus  - Etiology likely secondary to hypertension  - CT head without contrast showed large acute left basal ganglia/thalamus intraparenchymal hemorrhage extending into the left frontal lobe with associated vasogenic edema and mass effect (1.1 cm midline shift), intraventricular extension into the lateral, third and fourth ventricles with mild dilation of the left greater than right lateral ventricles  - CTA head/neck with contrast showed 90% stenosis at the origin of the left ICA, vessels at the superior and inferior aspects of the known parenchymal hematoma of the left basal ganglia/thalamus related to the venous structures with associated contrast blushing up to 3 mm  - Possible Neuro Endovascular consult; spoke with Dr. Matos to review imaging   - Given 250cc 3% bolus, no plan to continue hyperosmolar at this time  - Repeat CT head without contrast at 2000  - Goal SBP<160  - Neuro checks per protocol    CARDIOVASCULAR:  Hypertensive emergency  Essential hypertension  History of hyperlipidemia  History of CAD (s/p stent), CHF  - Goal SBP <160  - As needed labetalol/hydralazine  - Cardene infusion  - Clarify home medications  - Troponin<6, F/U EKG  - Echocardiogram ordered   - Continue telemetry    PULMONARY:  Acute hypoxic respiratory failure secondary to above  COPD without acute exacerbation  - Vent Settings: 30/14/22/600  - Check ABG  - CXR with no acute process  - Will ensure ETT is advanced 4cm  - Daily CXR  - Mya PRN     RENAL/FLUID/ELECTROLYTE:  Normal renal functioning  Mild hyponatremia

## 2025-02-03 ENCOUNTER — APPOINTMENT (OUTPATIENT)
Dept: GENERAL RADIOLOGY | Age: 65
DRG: 023 | End: 2025-02-03
Payer: MEDICARE

## 2025-02-03 LAB
ANION GAP SERPL CALCULATED.3IONS-SCNC: 13 MMOL/L (ref 9–16)
BASOPHILS # BLD: 0.04 K/UL (ref 0–0.2)
BASOPHILS NFR BLD: 0 % (ref 0–2)
BUN SERPL-MCNC: 20 MG/DL (ref 8–23)
CALCIUM SERPL-MCNC: 9.5 MG/DL (ref 8.6–10.4)
CHLORIDE SERPL-SCNC: 116 MMOL/L (ref 98–107)
CO2 SERPL-SCNC: 24 MMOL/L (ref 20–31)
CREAT SERPL-MCNC: 0.8 MG/DL (ref 0.7–1.2)
EOSINOPHIL # BLD: 0.14 K/UL (ref 0–0.44)
EOSINOPHILS RELATIVE PERCENT: 1 % (ref 1–4)
ERYTHROCYTE [DISTWIDTH] IN BLOOD BY AUTOMATED COUNT: 12.5 % (ref 11.8–14.4)
GFR, ESTIMATED: >90 ML/MIN/1.73M2
GLUCOSE SERPL-MCNC: 154 MG/DL (ref 74–99)
HCT VFR BLD AUTO: 47.9 % (ref 40.7–50.3)
HGB BLD-MCNC: 15.6 G/DL (ref 13–17)
IMM GRANULOCYTES # BLD AUTO: 0.04 K/UL (ref 0–0.3)
IMM GRANULOCYTES NFR BLD: 0 %
LYMPHOCYTES NFR BLD: 1.93 K/UL (ref 1.1–3.7)
LYMPHOCYTES RELATIVE PERCENT: 15 % (ref 24–43)
MCH RBC QN AUTO: 30.7 PG (ref 25.2–33.5)
MCHC RBC AUTO-ENTMCNC: 32.6 G/DL (ref 28.4–34.8)
MCV RBC AUTO: 94.3 FL (ref 82.6–102.9)
MONOCYTES NFR BLD: 1.17 K/UL (ref 0.1–1.2)
MONOCYTES NFR BLD: 9 % (ref 3–12)
NEUTROPHILS NFR BLD: 75 % (ref 36–65)
NEUTS SEG NFR BLD: 9.58 K/UL (ref 1.5–8.1)
NRBC BLD-RTO: 0 PER 100 WBC
PLATELET # BLD AUTO: 258 K/UL (ref 138–453)
PMV BLD AUTO: 9.9 FL (ref 8.1–13.5)
POTASSIUM SERPL-SCNC: 3.2 MMOL/L (ref 3.7–5.3)
RBC # BLD AUTO: 5.08 M/UL (ref 4.21–5.77)
SODIUM SERPL-SCNC: 152 MMOL/L (ref 136–145)
SODIUM SERPL-SCNC: 153 MMOL/L (ref 136–145)
WBC OTHER # BLD: 12.9 K/UL (ref 3.5–11.3)

## 2025-02-03 PROCEDURE — 6360000002 HC RX W HCPCS: Performed by: NURSE PRACTITIONER

## 2025-02-03 PROCEDURE — 2500000003 HC RX 250 WO HCPCS: Performed by: NURSE PRACTITIONER

## 2025-02-03 PROCEDURE — 2060000000 HC ICU INTERMEDIATE R&B

## 2025-02-03 PROCEDURE — 92611 MOTION FLUOROSCOPY/SWALLOW: CPT

## 2025-02-03 PROCEDURE — 85025 COMPLETE CBC W/AUTO DIFF WBC: CPT

## 2025-02-03 PROCEDURE — 6370000000 HC RX 637 (ALT 250 FOR IP): Performed by: NURSE PRACTITIONER

## 2025-02-03 PROCEDURE — 92526 ORAL FUNCTION THERAPY: CPT

## 2025-02-03 PROCEDURE — 6360000002 HC RX W HCPCS

## 2025-02-03 PROCEDURE — 99232 SBSQ HOSP IP/OBS MODERATE 35: CPT | Performed by: PSYCHIATRY & NEUROLOGY

## 2025-02-03 PROCEDURE — 84295 ASSAY OF SERUM SODIUM: CPT

## 2025-02-03 PROCEDURE — 2580000003 HC RX 258: Performed by: NURSE PRACTITIONER

## 2025-02-03 PROCEDURE — 2580000003 HC RX 258

## 2025-02-03 PROCEDURE — 36415 COLL VENOUS BLD VENIPUNCTURE: CPT

## 2025-02-03 PROCEDURE — 74230 X-RAY XM SWLNG FUNCJ C+: CPT

## 2025-02-03 PROCEDURE — 92507 TX SP LANG VOICE COMM INDIV: CPT

## 2025-02-03 PROCEDURE — 80048 BASIC METABOLIC PNL TOTAL CA: CPT

## 2025-02-03 RX ORDER — LISINOPRIL 20 MG/1
20 TABLET ORAL DAILY
Status: DISCONTINUED | OUTPATIENT
Start: 2025-02-04 | End: 2025-02-18 | Stop reason: HOSPADM

## 2025-02-03 RX ORDER — POTASSIUM CHLORIDE 7.45 MG/ML
10 INJECTION INTRAVENOUS
Status: COMPLETED | OUTPATIENT
Start: 2025-02-03 | End: 2025-02-03

## 2025-02-03 RX ADMIN — ENOXAPARIN SODIUM 40 MG: 100 INJECTION SUBCUTANEOUS at 09:04

## 2025-02-03 RX ADMIN — PIPERACILLIN AND TAZOBACTAM 3375 MG: 3; .375 INJECTION, POWDER, LYOPHILIZED, FOR SOLUTION INTRAVENOUS at 08:05

## 2025-02-03 RX ADMIN — SODIUM CHLORIDE, PRESERVATIVE FREE 10 ML: 5 INJECTION INTRAVENOUS at 08:08

## 2025-02-03 RX ADMIN — POTASSIUM CHLORIDE 10 MEQ: 10 INJECTION, SOLUTION INTRAVENOUS at 12:31

## 2025-02-03 RX ADMIN — POTASSIUM CHLORIDE 10 MEQ: 10 INJECTION, SOLUTION INTRAVENOUS at 15:35

## 2025-02-03 RX ADMIN — AMLODIPINE BESYLATE 10 MG: 10 TABLET ORAL at 08:07

## 2025-02-03 RX ADMIN — LISINOPRIL 10 MG: 20 TABLET ORAL at 08:07

## 2025-02-03 RX ADMIN — PIPERACILLIN AND TAZOBACTAM 3375 MG: 3; .375 INJECTION, POWDER, LYOPHILIZED, FOR SOLUTION INTRAVENOUS at 15:34

## 2025-02-03 RX ADMIN — POTASSIUM CHLORIDE 10 MEQ: 10 INJECTION, SOLUTION INTRAVENOUS at 17:49

## 2025-02-03 RX ADMIN — PIPERACILLIN AND TAZOBACTAM 3375 MG: 3; .375 INJECTION, POWDER, LYOPHILIZED, FOR SOLUTION INTRAVENOUS at 22:24

## 2025-02-03 RX ADMIN — SODIUM CHLORIDE, PRESERVATIVE FREE 40 MG: 5 INJECTION INTRAVENOUS at 08:06

## 2025-02-03 RX ADMIN — POTASSIUM CHLORIDE 10 MEQ: 10 INJECTION, SOLUTION INTRAVENOUS at 11:27

## 2025-02-03 ASSESSMENT — PAIN SCALES - GENERAL: PAINLEVEL_OUTOF10: 0

## 2025-02-03 NOTE — ANESTHESIA PRE PROCEDURE
22.4 01/28/2025 07:00 PM    [id number] 99.6 01/28/2025 07:00 PM        Type & Screen (If Applicable):  Lab Results   Component Value Date    ABORH A POSITIVE 01/28/2025    LABANTI NEGATIVE 01/28/2025       Drug/Infectious Status (If Applicable):  No results found for: \"HIV\", \"HEPCAB\"    COVID-19 Screening (If Applicable): No results found for: \"COVID19\"        Anesthesia Evaluation    Airway: Mallampati: Unable to assess / NA         Intubated Dental:          Pulmonary:normal exam    (+) pneumonia:     sleep apnea:                                  Cardiovascular:    (+) hypertension: moderate         Beta Blocker:  Not on Beta Blocker         Neuro/Psych:   Negative Neuro/Psych ROS              GI/Hepatic/Renal: Neg GI/Hepatic/Renal ROS            Endo/Other: Negative Endo/Other ROS                    Abdominal: normal exam            Vascular: negative vascular ROS.         Other Findings: Emergency. Intubated. stroke            Anesthesia Plan      general     ASA 4 - emergent           MIPS: Postoperative opioids intended.  Anesthetic plan and risks discussed with Unable to obtain due to emergent nature.      Plan discussed with CRNA.                    Kelli Camacho MD   2/3/2025

## 2025-02-03 NOTE — ANESTHESIA POSTPROCEDURE EVALUATION
Department of Anesthesiology  Postprocedure Note    Patient: Kalen Machado II  MRN: [id number]  YOB: 1960  Date of evaluation: 2/3/2025    Procedure Summary       Date: 01/28/25 Room / Location: [address]    Anesthesia Start: 1832 Anesthesia Stop: 2132    Procedure: E0 CRANIOTOMY HEMATOMA EVACUATION (Left) Diagnosis:       H/O intracranial hemorrhage      (H/O intracranial hemorrhage [Z86.79])    Surgeons: Meng Rogers MD Responsible Provider: Kelli Camacho MD    Anesthesia Type: general ASA Status: 4 - Emergent            Anesthesia Type: No value filed.    Brian Phase I:      Brian Phase II:      Anesthesia Post Evaluation    Patient location during evaluation: ICU  Patient participation: complete - patient cannot participate  Level of consciousness: sedated and ventilated  Airway patency: patent  Cardiovascular status: blood pressure returned to baseline  Respiratory status: acceptable  Hydration status: euvolemic  Pain management: adequate    No notable events documented.

## 2025-02-03 NOTE — PROCEDURES
Ongoing speech therapy is recommended at next level of care;Ongoing speech therapy is recommended during this hospitalization     Recommended Exercises:    Therapeutic Interventions: Laryngeal exercises;Pharyngeal exercises    Education: Images and recommendations were reviewed with pt following this exam.   Patient Education: yes  Patient Education Response: No evidence of learning    Goals:    Long Term:       To Maximize safety with intake, optimize nutrition/hydration and minimize risk for aspiration.      Short Term:     Goal 1: Trial O/M treatment program for dysphagia    Oral Preparation / Oral Phase: Impaired     Pt presents with severe oral phase dysphagia, unable to transport Puree bolus A-P, nectar was added and bolus was moved A-P via gravity.  Bolus spilled to pyriform and laryngeal vestibule.     Pharyngeal Phase: Impaired     Puree/Nectar mixed bolus:  Bolus spilled to pyriform and laryngeal vestibule. + gross aspiration with weak barely audible throat clear with moderate vallec and pyriform residual.      Esophageal Phase  Esophageal Screen: Impaired  Upper Esophageal Screen- Major Contributing Deficits  Major Contributing Deficits: Reduced Cricopharyngeal Opening        Pain      Pain Level: 0/10      Therapy Time:   Individual Concurrent Group Co-treatment   Time In  1330         Time Out  1345         Minutes  15                   Leana Brady, SLP, 2/3/2025, 3:06 PM    PROCEDURE NOTE  Date: 2/3/2025   Name: Leland Rosario [facility] II  YOB: 1960    Procedures

## 2025-02-04 ENCOUNTER — APPOINTMENT (OUTPATIENT)
Dept: GENERAL RADIOLOGY | Age: 65
DRG: 023 | End: 2025-02-04
Payer: MEDICARE

## 2025-02-04 PROBLEM — E43 SEVERE MALNUTRITION (HCC): Status: ACTIVE | Noted: 2025-02-04

## 2025-02-04 PROBLEM — E43 SEVERE MALNUTRITION: Status: ACTIVE | Noted: 2025-02-04

## 2025-02-04 LAB
ANION GAP SERPL CALCULATED.3IONS-SCNC: 11 MMOL/L (ref 9–16)
BASOPHILS # BLD: 0.04 K/UL (ref 0–0.2)
BASOPHILS NFR BLD: 0 % (ref 0–2)
BUN SERPL-MCNC: 20 MG/DL (ref 8–23)
CALCIUM SERPL-MCNC: 9.4 MG/DL (ref 8.6–10.4)
CHLORIDE SERPL-SCNC: 118 MMOL/L (ref 98–107)
CO2 SERPL-SCNC: 25 MMOL/L (ref 20–31)
CREAT SERPL-MCNC: 0.8 MG/DL (ref 0.7–1.2)
EOSINOPHIL # BLD: 0.35 K/UL (ref 0–0.44)
EOSINOPHILS RELATIVE PERCENT: 3 % (ref 1–4)
ERYTHROCYTE [DISTWIDTH] IN BLOOD BY AUTOMATED COUNT: 12.6 % (ref 11.8–14.4)
GFR, ESTIMATED: >90 ML/MIN/1.73M2
GLUCOSE SERPL-MCNC: 134 MG/DL (ref 74–99)
HCT VFR BLD AUTO: 47.8 % (ref 40.7–50.3)
HGB BLD-MCNC: 15.4 G/DL (ref 13–17)
IMM GRANULOCYTES # BLD AUTO: 0.04 K/UL (ref 0–0.3)
IMM GRANULOCYTES NFR BLD: 0 %
LYMPHOCYTES NFR BLD: 2.04 K/UL (ref 1.1–3.7)
LYMPHOCYTES RELATIVE PERCENT: 15 % (ref 24–43)
MAGNESIUM SERPL-MCNC: 2.1 MG/DL (ref 1.6–2.4)
MCH RBC QN AUTO: 31 PG (ref 25.2–33.5)
MCHC RBC AUTO-ENTMCNC: 32.2 G/DL (ref 28.4–34.8)
MCV RBC AUTO: 96.4 FL (ref 82.6–102.9)
MONOCYTES NFR BLD: 0.93 K/UL (ref 0.1–1.2)
MONOCYTES NFR BLD: 7 % (ref 3–12)
NEUTROPHILS NFR BLD: 75 % (ref 36–65)
NEUTS SEG NFR BLD: 10.29 K/UL (ref 1.5–8.1)
NRBC BLD-RTO: 0 PER 100 WBC
PLATELET # BLD AUTO: 251 K/UL (ref 138–453)
PMV BLD AUTO: 10 FL (ref 8.1–13.5)
POTASSIUM SERPL-SCNC: 3.9 MMOL/L (ref 3.7–5.3)
RBC # BLD AUTO: 4.96 M/UL (ref 4.21–5.77)
SODIUM SERPL-SCNC: 154 MMOL/L (ref 136–145)
WBC OTHER # BLD: 13.7 K/UL (ref 3.5–11.3)

## 2025-02-04 PROCEDURE — 6370000000 HC RX 637 (ALT 250 FOR IP): Performed by: NURSE PRACTITIONER

## 2025-02-04 PROCEDURE — 2580000003 HC RX 258

## 2025-02-04 PROCEDURE — 6370000000 HC RX 637 (ALT 250 FOR IP): Performed by: STUDENT IN AN ORGANIZED HEALTH CARE EDUCATION/TRAINING PROGRAM

## 2025-02-04 PROCEDURE — 2060000000 HC ICU INTERMEDIATE R&B

## 2025-02-04 PROCEDURE — 2580000003 HC RX 258: Performed by: NURSE PRACTITIONER

## 2025-02-04 PROCEDURE — 99232 SBSQ HOSP IP/OBS MODERATE 35: CPT | Performed by: PSYCHIATRY & NEUROLOGY

## 2025-02-04 PROCEDURE — 74018 RADEX ABDOMEN 1 VIEW: CPT

## 2025-02-04 PROCEDURE — 92507 TX SP LANG VOICE COMM INDIV: CPT

## 2025-02-04 PROCEDURE — 97110 THERAPEUTIC EXERCISES: CPT

## 2025-02-04 PROCEDURE — 85025 COMPLETE CBC W/AUTO DIFF WBC: CPT

## 2025-02-04 PROCEDURE — 2500000003 HC RX 250 WO HCPCS: Performed by: NURSE PRACTITIONER

## 2025-02-04 PROCEDURE — 6370000000 HC RX 637 (ALT 250 FOR IP)

## 2025-02-04 PROCEDURE — 97530 THERAPEUTIC ACTIVITIES: CPT

## 2025-02-04 PROCEDURE — 71045 X-RAY EXAM CHEST 1 VIEW: CPT

## 2025-02-04 PROCEDURE — 9900000072 HC NEUROMUSCULAR RE-EDUCATION (SELF-PAY)

## 2025-02-04 PROCEDURE — 99222 1ST HOSP IP/OBS MODERATE 55: CPT | Performed by: PHYSICAL MEDICINE & REHABILITATION

## 2025-02-04 PROCEDURE — 83735 ASSAY OF MAGNESIUM: CPT

## 2025-02-04 PROCEDURE — 80048 BASIC METABOLIC PNL TOTAL CA: CPT

## 2025-02-04 PROCEDURE — 36415 COLL VENOUS BLD VENIPUNCTURE: CPT

## 2025-02-04 PROCEDURE — 6360000002 HC RX W HCPCS: Performed by: NURSE PRACTITIONER

## 2025-02-04 RX ORDER — SENNA AND DOCUSATE SODIUM 50; 8.6 MG/1; MG/1
2 TABLET, FILM COATED ORAL DAILY PRN
Status: DISCONTINUED | OUTPATIENT
Start: 2025-02-04 | End: 2025-02-06

## 2025-02-04 RX ORDER — LANOLIN ALCOHOL/MO/W.PET/CERES
400 CREAM (GRAM) TOPICAL ONCE
Status: COMPLETED | OUTPATIENT
Start: 2025-02-04 | End: 2025-02-04

## 2025-02-04 RX ORDER — SODIUM CHLORIDE 450 MG/100ML
INJECTION, SOLUTION INTRAVENOUS CONTINUOUS
Status: DISCONTINUED | OUTPATIENT
Start: 2025-02-04 | End: 2025-02-06

## 2025-02-04 RX ORDER — POLYETHYLENE GLYCOL 3350 17 G/17G
17 POWDER, FOR SOLUTION ORAL DAILY
Status: DISCONTINUED | OUTPATIENT
Start: 2025-02-04 | End: 2025-02-18 | Stop reason: HOSPADM

## 2025-02-04 RX ORDER — HYDROCHLOROTHIAZIDE 25 MG/1
12.5 TABLET ORAL DAILY
Status: DISCONTINUED | OUTPATIENT
Start: 2025-02-04 | End: 2025-02-18 | Stop reason: HOSPADM

## 2025-02-04 RX ADMIN — ENOXAPARIN SODIUM 40 MG: 100 INJECTION SUBCUTANEOUS at 08:11

## 2025-02-04 RX ADMIN — Medication 400 MG: at 16:08

## 2025-02-04 RX ADMIN — SODIUM CHLORIDE, PRESERVATIVE FREE 10 ML: 5 INJECTION INTRAVENOUS at 08:11

## 2025-02-04 RX ADMIN — SODIUM CHLORIDE: 4.5 INJECTION, SOLUTION INTRAVENOUS at 13:09

## 2025-02-04 RX ADMIN — AMLODIPINE BESYLATE 10 MG: 10 TABLET ORAL at 08:11

## 2025-02-04 RX ADMIN — SODIUM CHLORIDE, PRESERVATIVE FREE 40 MG: 5 INJECTION INTRAVENOUS at 08:11

## 2025-02-04 RX ADMIN — LISINOPRIL 20 MG: 20 TABLET ORAL at 08:11

## 2025-02-04 RX ADMIN — HYDRALAZINE HYDROCHLORIDE 10 MG: 20 INJECTION INTRAMUSCULAR; INTRAVENOUS at 23:50

## 2025-02-04 RX ADMIN — HYDROCHLOROTHIAZIDE 12.5 MG: 25 TABLET ORAL at 13:09

## 2025-02-04 RX ADMIN — POLYETHYLENE GLYCOL 3350 17 G: 17 POWDER, FOR SOLUTION ORAL at 16:08

## 2025-02-04 ASSESSMENT — PAIN SCALES - GENERAL: PAINLEVEL_OUTOF10: 0

## 2025-02-04 NOTE — CARE COORDINATION
[facility] Quality Flow/Interdisciplinary Rounds Progress Note    Quality Flow Rounds held on February 4, 2025 at 0930    Disciplines Attending:  Bedside Nurse, , and Nursing Unit Leadership    Barriers to Discharge: NG, medical complications    Anticipated Discharge Date:  unknown     Anticipated Discharge Disposition: ARU    [facility] RISK OF UNPLANNED READMISSION 2.0             9.2 Total Score        Discussed patient goal for the day, patient clinical progression, and barriers to discharge.  The following Goal(s) of the Day/Commitment(s) have been identified:   Discussed obtaining a PM&R consult, pt has failed swallow and has NG, PEG placement will be discussed in rounds.    1010 PC to Ginny at [facility], left VM regarding referral.      MARTHA LAI  February 4, 2025

## 2025-02-05 ENCOUNTER — APPOINTMENT (OUTPATIENT)
Dept: GENERAL RADIOLOGY | Age: 65
DRG: 023 | End: 2025-02-05
Payer: MEDICARE

## 2025-02-05 ENCOUNTER — ANESTHESIA EVENT (OUTPATIENT)
Dept: OPERATING ROOM | Age: 65
DRG: 023 | End: 2025-02-05
Payer: MEDICARE

## 2025-02-05 PROBLEM — R13.12 OROPHARYNGEAL DYSPHAGIA: Status: ACTIVE | Noted: 2025-02-05

## 2025-02-05 LAB
ANION GAP SERPL CALCULATED.3IONS-SCNC: 13 MMOL/L (ref 9–16)
BACTERIA URNS QL MICRO: ABNORMAL
BASOPHILS # BLD: 0.06 K/UL (ref 0–0.2)
BASOPHILS NFR BLD: 0 % (ref 0–2)
BILIRUB UR QL STRIP: NEGATIVE
BUN SERPL-MCNC: 19 MG/DL (ref 8–23)
CALCIUM SERPL-MCNC: 9.9 MG/DL (ref 8.6–10.4)
CASTS #/AREA URNS LPF: ABNORMAL /LPF (ref 0–8)
CHLORIDE SERPL-SCNC: 111 MMOL/L (ref 98–107)
CLARITY UR: ABNORMAL
CO2 SERPL-SCNC: 23 MMOL/L (ref 20–31)
COLOR UR: YELLOW
CREAT SERPL-MCNC: 0.7 MG/DL (ref 0.7–1.2)
EOSINOPHIL # BLD: 0.51 K/UL (ref 0–0.44)
EOSINOPHILS RELATIVE PERCENT: 3 % (ref 1–4)
EPI CELLS #/AREA URNS HPF: ABNORMAL /HPF (ref 0–5)
ERYTHROCYTE [DISTWIDTH] IN BLOOD BY AUTOMATED COUNT: 12.4 % (ref 11.8–14.4)
GFR, ESTIMATED: >90 ML/MIN/1.73M2
GLUCOSE SERPL-MCNC: 150 MG/DL (ref 74–99)
GLUCOSE UR STRIP-MCNC: NEGATIVE MG/DL
HCT VFR BLD AUTO: 49.8 % (ref 40.7–50.3)
HGB BLD-MCNC: 16.2 G/DL (ref 13–17)
HGB UR QL STRIP.AUTO: NEGATIVE
IMM GRANULOCYTES # BLD AUTO: 0.07 K/UL (ref 0–0.3)
IMM GRANULOCYTES NFR BLD: 0 %
KETONES UR STRIP-MCNC: NEGATIVE MG/DL
LEUKOCYTE ESTERASE UR QL STRIP: NEGATIVE
LYMPHOCYTES NFR BLD: 2.34 K/UL (ref 1.1–3.7)
LYMPHOCYTES RELATIVE PERCENT: 15 % (ref 24–43)
MCH RBC QN AUTO: 31.2 PG (ref 25.2–33.5)
MCHC RBC AUTO-ENTMCNC: 32.5 G/DL (ref 28.4–34.8)
MCV RBC AUTO: 95.8 FL (ref 82.6–102.9)
MICROORGANISM SPEC CULT: NORMAL
MICROORGANISM SPEC CULT: NORMAL
MONOCYTES NFR BLD: 1.06 K/UL (ref 0.1–1.2)
MONOCYTES NFR BLD: 7 % (ref 3–12)
NEUTROPHILS NFR BLD: 75 % (ref 36–65)
NEUTS SEG NFR BLD: 11.98 K/UL (ref 1.5–8.1)
NITRITE UR QL STRIP: NEGATIVE
NRBC BLD-RTO: 0 PER 100 WBC
PH UR STRIP: 8 [PH] (ref 5–8)
PLATELET # BLD AUTO: 255 K/UL (ref 138–453)
PMV BLD AUTO: 10.4 FL (ref 8.1–13.5)
POTASSIUM SERPL-SCNC: 3.6 MMOL/L (ref 3.7–5.3)
PROT UR STRIP-MCNC: ABNORMAL MG/DL
RBC # BLD AUTO: 5.2 M/UL (ref 4.21–5.77)
RBC #/AREA URNS HPF: ABNORMAL /HPF (ref 0–4)
SERVICE CMNT-IMP: NORMAL
SERVICE CMNT-IMP: NORMAL
SODIUM SERPL-SCNC: 147 MMOL/L (ref 136–145)
SODIUM SERPL-SCNC: 147 MMOL/L (ref 136–145)
SP GR UR STRIP: 1.02 (ref 1–1.03)
SPECIMEN DESCRIPTION: NORMAL
SPECIMEN DESCRIPTION: NORMAL
UROBILINOGEN UR STRIP-ACNC: NORMAL EU/DL (ref 0–1)
WBC #/AREA URNS HPF: ABNORMAL /HPF (ref 0–5)
WBC OTHER # BLD: 16 K/UL (ref 3.5–11.3)

## 2025-02-05 PROCEDURE — 92507 TX SP LANG VOICE COMM INDIV: CPT

## 2025-02-05 PROCEDURE — 97530 THERAPEUTIC ACTIVITIES: CPT

## 2025-02-05 PROCEDURE — 2580000003 HC RX 258: Performed by: NURSE PRACTITIONER

## 2025-02-05 PROCEDURE — 74018 RADEX ABDOMEN 1 VIEW: CPT

## 2025-02-05 PROCEDURE — 85025 COMPLETE CBC W/AUTO DIFF WBC: CPT

## 2025-02-05 PROCEDURE — 36415 COLL VENOUS BLD VENIPUNCTURE: CPT

## 2025-02-05 PROCEDURE — 97535 SELF CARE MNGMENT TRAINING: CPT

## 2025-02-05 PROCEDURE — 2500000003 HC RX 250 WO HCPCS: Performed by: NURSE PRACTITIONER

## 2025-02-05 PROCEDURE — 6360000002 HC RX W HCPCS: Performed by: NURSE PRACTITIONER

## 2025-02-05 PROCEDURE — 99222 1ST HOSP IP/OBS MODERATE 55: CPT | Performed by: STUDENT IN AN ORGANIZED HEALTH CARE EDUCATION/TRAINING PROGRAM

## 2025-02-05 PROCEDURE — 81001 URINALYSIS AUTO W/SCOPE: CPT

## 2025-02-05 PROCEDURE — 6370000000 HC RX 637 (ALT 250 FOR IP)

## 2025-02-05 PROCEDURE — 99232 SBSQ HOSP IP/OBS MODERATE 35: CPT | Performed by: PSYCHIATRY & NEUROLOGY

## 2025-02-05 PROCEDURE — 6370000000 HC RX 637 (ALT 250 FOR IP): Performed by: NURSE PRACTITIONER

## 2025-02-05 PROCEDURE — 99233 SBSQ HOSP IP/OBS HIGH 50: CPT | Performed by: PSYCHIATRY & NEUROLOGY

## 2025-02-05 PROCEDURE — 2060000000 HC ICU INTERMEDIATE R&B

## 2025-02-05 PROCEDURE — 97112 NEUROMUSCULAR REEDUCATION: CPT

## 2025-02-05 PROCEDURE — 84295 ASSAY OF SERUM SODIUM: CPT

## 2025-02-05 PROCEDURE — 80048 BASIC METABOLIC PNL TOTAL CA: CPT

## 2025-02-05 RX ADMIN — SODIUM CHLORIDE, PRESERVATIVE FREE 10 ML: 5 INJECTION INTRAVENOUS at 20:43

## 2025-02-05 RX ADMIN — LISINOPRIL 20 MG: 20 TABLET ORAL at 09:25

## 2025-02-05 RX ADMIN — ENOXAPARIN SODIUM 40 MG: 100 INJECTION SUBCUTANEOUS at 09:25

## 2025-02-05 RX ADMIN — SODIUM CHLORIDE, PRESERVATIVE FREE 10 ML: 5 INJECTION INTRAVENOUS at 09:30

## 2025-02-05 RX ADMIN — AMLODIPINE BESYLATE 10 MG: 10 TABLET ORAL at 09:25

## 2025-02-05 RX ADMIN — ACETAMINOPHEN 650 MG: 325 TABLET ORAL at 09:29

## 2025-02-05 RX ADMIN — POLYETHYLENE GLYCOL 3350 17 G: 17 POWDER, FOR SOLUTION ORAL at 09:25

## 2025-02-05 RX ADMIN — HYDRALAZINE HYDROCHLORIDE 10 MG: 20 INJECTION INTRAMUSCULAR; INTRAVENOUS at 02:05

## 2025-02-05 RX ADMIN — HYDROCHLOROTHIAZIDE 12.5 MG: 25 TABLET ORAL at 09:26

## 2025-02-05 RX ADMIN — ACETAMINOPHEN 650 MG: 325 TABLET ORAL at 02:05

## 2025-02-05 RX ADMIN — SODIUM CHLORIDE, PRESERVATIVE FREE 40 MG: 5 INJECTION INTRAVENOUS at 09:25

## 2025-02-05 ASSESSMENT — PAIN SCALES - GENERAL
PAINLEVEL_OUTOF10: 3
PAINLEVEL_OUTOF10: 0

## 2025-02-05 NOTE — CARE COORDINATION
[place] Quality Flow/Interdisciplinary Rounds Progress Note    Quality Flow Rounds held on February 5, 2025 at 0930    Disciplines Attending:  Bedside Nurse, , and Nursing Unit Leadership    Barriers to Discharge: medical complications     Anticipated Discharge Date:   unknown     Anticipated Discharge Disposition: snf    [facility] RISK OF UNPLANNED READMISSION 2.0             10.1 Total Score        Discussed patient goal for the day, patient clinical progression, and barriers to discharge.  The following Goal(s) of the Day/Commitment(s) have been identified:   Plan is for PEG this week, has NG.   PM&R- not appropriate for ARU.  Will need snf choices.       MARTHA LAI  February 5, 2025

## 2025-02-06 ENCOUNTER — APPOINTMENT (OUTPATIENT)
Dept: CT IMAGING | Age: 65
DRG: 023 | End: 2025-02-06
Payer: MEDICARE

## 2025-02-06 ENCOUNTER — APPOINTMENT (OUTPATIENT)
Dept: GENERAL RADIOLOGY | Age: 65
DRG: 023 | End: 2025-02-06
Payer: MEDICARE

## 2025-02-06 ENCOUNTER — ANESTHESIA (OUTPATIENT)
Dept: OPERATING ROOM | Age: 65
DRG: 023 | End: 2025-02-06
Payer: MEDICARE

## 2025-02-06 PROBLEM — D72.825 BANDEMIA: Status: ACTIVE | Noted: 2025-02-06

## 2025-02-06 PROBLEM — R50.9 FEVER: Status: ACTIVE | Noted: 2025-02-06

## 2025-02-06 LAB
ANION GAP SERPL CALCULATED.3IONS-SCNC: 11 MMOL/L (ref 9–16)
B PARAP IS1001 DNA NPH QL NAA+NON-PROBE: NOT DETECTED
B PERT DNA SPEC QL NAA+PROBE: NOT DETECTED
BASOPHILS # BLD: 0.07 K/UL (ref 0–0.2)
BASOPHILS NFR BLD: 0 % (ref 0–2)
BODY TEMPERATURE: 37
BUN SERPL-MCNC: 23 MG/DL (ref 8–23)
C PNEUM DNA NPH QL NAA+NON-PROBE: NOT DETECTED
CALCIUM SERPL-MCNC: 9.8 MG/DL (ref 8.6–10.4)
CHLORIDE SERPL-SCNC: 109 MMOL/L (ref 98–107)
CO2 SERPL-SCNC: 26 MMOL/L (ref 20–31)
COHGB MFR BLD: 1.5 % (ref 0–5)
CREAT SERPL-MCNC: 0.8 MG/DL (ref 0.7–1.2)
EOSINOPHIL # BLD: 0.59 K/UL (ref 0–0.44)
EOSINOPHILS RELATIVE PERCENT: 3 % (ref 1–4)
ERYTHROCYTE [DISTWIDTH] IN BLOOD BY AUTOMATED COUNT: 12.5 % (ref 11.8–14.4)
FIO2 ON VENT: ABNORMAL %
FLUAV RNA NPH QL NAA+NON-PROBE: NOT DETECTED
FLUBV RNA NPH QL NAA+NON-PROBE: NOT DETECTED
GFR, ESTIMATED: >90 ML/MIN/1.73M2
GLUCOSE SERPL-MCNC: 136 MG/DL (ref 74–99)
HADV DNA NPH QL NAA+NON-PROBE: NOT DETECTED
HCO3 VENOUS: 27.4 MMOL/L (ref 24–30)
HCOV 229E RNA NPH QL NAA+NON-PROBE: NOT DETECTED
HCOV HKU1 RNA NPH QL NAA+NON-PROBE: NOT DETECTED
HCOV NL63 RNA NPH QL NAA+NON-PROBE: NOT DETECTED
HCOV OC43 RNA NPH QL NAA+NON-PROBE: NOT DETECTED
HCT VFR BLD AUTO: 48.9 % (ref 40.7–50.3)
HGB BLD-MCNC: 16 G/DL (ref 13–17)
HMPV RNA NPH QL NAA+NON-PROBE: NOT DETECTED
HPIV1 RNA NPH QL NAA+NON-PROBE: NOT DETECTED
HPIV2 RNA NPH QL NAA+NON-PROBE: NOT DETECTED
HPIV3 RNA NPH QL NAA+NON-PROBE: NOT DETECTED
HPIV4 RNA NPH QL NAA+NON-PROBE: NOT DETECTED
IMM GRANULOCYTES # BLD AUTO: 0.07 K/UL (ref 0–0.3)
IMM GRANULOCYTES NFR BLD: 0 %
LYMPHOCYTES NFR BLD: 2.82 K/UL (ref 1.1–3.7)
LYMPHOCYTES RELATIVE PERCENT: 16 % (ref 24–43)
M PNEUMO DNA NPH QL NAA+NON-PROBE: NOT DETECTED
MCH RBC QN AUTO: 30.8 PG (ref 25.2–33.5)
MCHC RBC AUTO-ENTMCNC: 32.7 G/DL (ref 28.4–34.8)
MCV RBC AUTO: 94 FL (ref 82.6–102.9)
MONOCYTES NFR BLD: 1.35 K/UL (ref 0.1–1.2)
MONOCYTES NFR BLD: 7 % (ref 3–12)
NEUTROPHILS NFR BLD: 74 % (ref 36–65)
NEUTS SEG NFR BLD: 13.28 K/UL (ref 1.5–8.1)
NRBC BLD-RTO: 0 PER 100 WBC
O2 SAT, VEN: 69.4 % (ref 60–85)
PCO2 VENOUS: 41.4 MM HG (ref 39–55)
PH VENOUS: 7.44 (ref 7.32–7.42)
PLATELET # BLD AUTO: 284 K/UL (ref 138–453)
PMV BLD AUTO: 10.7 FL (ref 8.1–13.5)
PO2 VENOUS: 35.1 MM HG (ref 30–50)
POSITIVE BASE EXCESS, VEN: 2.9 MMOL/L (ref 0–2)
POTASSIUM SERPL-SCNC: 4.1 MMOL/L (ref 3.7–5.3)
RBC # BLD AUTO: 5.2 M/UL (ref 4.21–5.77)
RSV RNA NPH QL NAA+NON-PROBE: NOT DETECTED
RV+EV RNA NPH QL NAA+NON-PROBE: NOT DETECTED
SARS-COV-2 RNA NPH QL NAA+NON-PROBE: NOT DETECTED
SODIUM SERPL-SCNC: 146 MMOL/L (ref 136–145)
SPECIMEN DESCRIPTION: NORMAL
WBC OTHER # BLD: 18.2 K/UL (ref 3.5–11.3)

## 2025-02-06 PROCEDURE — 0202U NFCT DS 22 TRGT SARS-COV-2: CPT

## 2025-02-06 PROCEDURE — 6370000000 HC RX 637 (ALT 250 FOR IP)

## 2025-02-06 PROCEDURE — 99232 SBSQ HOSP IP/OBS MODERATE 35: CPT | Performed by: PSYCHIATRY & NEUROLOGY

## 2025-02-06 PROCEDURE — 85025 COMPLETE CBC W/AUTO DIFF WBC: CPT

## 2025-02-06 PROCEDURE — 2500000003 HC RX 250 WO HCPCS: Performed by: STUDENT IN AN ORGANIZED HEALTH CARE EDUCATION/TRAINING PROGRAM

## 2025-02-06 PROCEDURE — 80048 BASIC METABOLIC PNL TOTAL CA: CPT

## 2025-02-06 PROCEDURE — 6360000004 HC RX CONTRAST MEDICATION: Performed by: STUDENT IN AN ORGANIZED HEALTH CARE EDUCATION/TRAINING PROGRAM

## 2025-02-06 PROCEDURE — 97530 THERAPEUTIC ACTIVITIES: CPT

## 2025-02-06 PROCEDURE — 36415 COLL VENOUS BLD VENIPUNCTURE: CPT

## 2025-02-06 PROCEDURE — 2060000000 HC ICU INTERMEDIATE R&B

## 2025-02-06 PROCEDURE — 6370000000 HC RX 637 (ALT 250 FOR IP): Performed by: STUDENT IN AN ORGANIZED HEALTH CARE EDUCATION/TRAINING PROGRAM

## 2025-02-06 PROCEDURE — 71045 X-RAY EXAM CHEST 1 VIEW: CPT

## 2025-02-06 PROCEDURE — G0545 PR INHERENT VISIT TO INPT: HCPCS | Performed by: INTERNAL MEDICINE

## 2025-02-06 PROCEDURE — 82805 BLOOD GASES W/O2 SATURATION: CPT

## 2025-02-06 PROCEDURE — 97112 NEUROMUSCULAR REEDUCATION: CPT

## 2025-02-06 PROCEDURE — 99223 1ST HOSP IP/OBS HIGH 75: CPT | Performed by: INTERNAL MEDICINE

## 2025-02-06 PROCEDURE — 99233 SBSQ HOSP IP/OBS HIGH 50: CPT | Performed by: PSYCHIATRY & NEUROLOGY

## 2025-02-06 PROCEDURE — 97110 THERAPEUTIC EXERCISES: CPT

## 2025-02-06 PROCEDURE — 74177 CT ABD & PELVIS W/CONTRAST: CPT

## 2025-02-06 PROCEDURE — 99232 SBSQ HOSP IP/OBS MODERATE 35: CPT | Performed by: PHYSICAL MEDICINE & REHABILITATION

## 2025-02-06 RX ORDER — SODIUM CHLORIDE 0.9 % (FLUSH) 0.9 %
5-40 SYRINGE (ML) INJECTION EVERY 12 HOURS SCHEDULED
Status: CANCELLED | OUTPATIENT
Start: 2025-02-06

## 2025-02-06 RX ORDER — NALOXONE HYDROCHLORIDE 0.4 MG/ML
INJECTION, SOLUTION INTRAMUSCULAR; INTRAVENOUS; SUBCUTANEOUS PRN
Status: CANCELLED | OUTPATIENT
Start: 2025-02-06

## 2025-02-06 RX ORDER — IPRATROPIUM BROMIDE AND ALBUTEROL SULFATE 2.5; .5 MG/3ML; MG/3ML
1 SOLUTION RESPIRATORY (INHALATION)
Status: CANCELLED | OUTPATIENT
Start: 2025-02-06 | End: 2025-02-07

## 2025-02-06 RX ORDER — BISACODYL 10 MG
10 SUPPOSITORY, RECTAL RECTAL DAILY
Status: DISCONTINUED | OUTPATIENT
Start: 2025-02-06 | End: 2025-02-18 | Stop reason: HOSPADM

## 2025-02-06 RX ORDER — HYDRALAZINE HYDROCHLORIDE 20 MG/ML
10 INJECTION INTRAMUSCULAR; INTRAVENOUS
Status: CANCELLED | OUTPATIENT
Start: 2025-02-06

## 2025-02-06 RX ORDER — FENTANYL CITRATE 50 UG/ML
50 INJECTION, SOLUTION INTRAMUSCULAR; INTRAVENOUS EVERY 5 MIN PRN
Status: CANCELLED | OUTPATIENT
Start: 2025-02-06

## 2025-02-06 RX ORDER — SENNA AND DOCUSATE SODIUM 50; 8.6 MG/1; MG/1
2 TABLET, FILM COATED ORAL DAILY
Status: DISCONTINUED | OUTPATIENT
Start: 2025-02-06 | End: 2025-02-18 | Stop reason: HOSPADM

## 2025-02-06 RX ORDER — FENTANYL CITRATE 50 UG/ML
25 INJECTION, SOLUTION INTRAMUSCULAR; INTRAVENOUS EVERY 5 MIN PRN
Status: CANCELLED | OUTPATIENT
Start: 2025-02-06

## 2025-02-06 RX ORDER — SODIUM CHLORIDE 9 MG/ML
INJECTION, SOLUTION INTRAVENOUS PRN
Status: CANCELLED | OUTPATIENT
Start: 2025-02-06

## 2025-02-06 RX ORDER — SODIUM CHLORIDE 0.9 % (FLUSH) 0.9 %
5-40 SYRINGE (ML) INJECTION PRN
Status: CANCELLED | OUTPATIENT
Start: 2025-02-06

## 2025-02-06 RX ORDER — DIPHENHYDRAMINE HYDROCHLORIDE 50 MG/ML
12.5 INJECTION INTRAMUSCULAR; INTRAVENOUS
Status: CANCELLED | OUTPATIENT
Start: 2025-02-06 | End: 2025-02-07

## 2025-02-06 RX ORDER — LABETALOL HYDROCHLORIDE 5 MG/ML
10 INJECTION, SOLUTION INTRAVENOUS
Status: CANCELLED | OUTPATIENT
Start: 2025-02-06

## 2025-02-06 RX ORDER — IOPAMIDOL 755 MG/ML
75 INJECTION, SOLUTION INTRAVASCULAR
Status: COMPLETED | OUTPATIENT
Start: 2025-02-06 | End: 2025-02-06

## 2025-02-06 RX ORDER — PROCHLORPERAZINE EDISYLATE 5 MG/ML
5 INJECTION INTRAMUSCULAR; INTRAVENOUS
Status: CANCELLED | OUTPATIENT
Start: 2025-02-06 | End: 2025-02-07

## 2025-02-06 RX ORDER — NYSTATIN 100000 [USP'U]/ML
5 SUSPENSION ORAL 4 TIMES DAILY
Status: DISCONTINUED | OUTPATIENT
Start: 2025-02-06 | End: 2025-02-18 | Stop reason: HOSPADM

## 2025-02-06 RX ADMIN — Medication 500000 UNITS: at 20:23

## 2025-02-06 RX ADMIN — IOPAMIDOL 75 ML: 755 INJECTION, SOLUTION INTRAVENOUS at 11:24

## 2025-02-06 RX ADMIN — SODIUM CHLORIDE, PRESERVATIVE FREE 10 ML: 5 INJECTION INTRAVENOUS at 20:23

## 2025-02-06 RX ADMIN — BISACODYL 10 MG: 10 SUPPOSITORY RECTAL at 12:52

## 2025-02-06 RX ADMIN — LIDOCAINE HYDROCHLORIDE 5 ML: 20 SOLUTION ORAL; TOPICAL at 15:49

## 2025-02-06 RX ADMIN — ACETAMINOPHEN 650 MG: 325 TABLET ORAL at 20:17

## 2025-02-06 ASSESSMENT — PAIN - FUNCTIONAL ASSESSMENT: PAIN_FUNCTIONAL_ASSESSMENT: WONG-BAKER FACES

## 2025-02-06 NOTE — ANESTHESIA PRE PROCEDURE
Department of Anesthesiology  Preprocedure Note       Name:  Leland Eduardo II   Age:  64 y.o.  :  1960                                          MRN:  [id number]         Date:  2025      Surgeon: Surgeon(s):  Eduard Elise MD    Procedure: Procedure(s):  ESOPHAGOGASTRODUODENOSCOPY PERCUTANEOUS ENDOSCOPIC GASTROSTOMY TUBE PLACEMENT    Medications prior to admission:   Prior to Admission medications    Not on File       Current medications:    Current Facility-Administered Medications   Medication Dose Route Frequency Provider Last Rate Last Admin   • hydroCHLOROthiazide (HYDRODIURIL) tablet 12.5 mg  12.5 mg Oral Daily Sangeeta Lockhart MD   12.5 mg at 25   • 0.45 % sodium chloride infusion   IntraVENous Continuous Zeke Sebastian MD 50 mL/hr at 25 Restarted at 25   • polyethylene glycol (GLYCOLAX) packet 17 g  17 g Oral Daily Sangeeta Lockhart MD   17 g at 25   • sennosides-docusate sodium (SENOKOT-S) 8.6-50 MG tablet 2 tablet  2 tablet Oral Daily PRN Sangeeta Lockhart MD       • lisinopril (PRINIVIL;ZESTRIL) tablet 20 mg  20 mg Per NG tube Daily Kyree Silva MD   20 mg at 25   • potassium chloride (KLOR-CON M) extended release tablet 40 mEq  40 mEq Oral PRN Manuelito Ingram MD        Or   • potassium bicarb-citric acid (EFFER-K) effervescent tablet 40 mEq  40 mEq Oral PRN Manuelito Ingram MD        Or   • potassium chloride 10 mEq/100 mL IVPB (Peripheral Line)  10 mEq IntraVENous PRN Manuelito Ingram MD       • magnesium sulfate 2000 mg in 50 mL IVPB premix  2,000 mg IntraVENous PRN Manuelito Ingram MD       • amLODIPine (NORVASC) tablet 10 mg  10 mg Per NG tube Daily Kanwal Siegel APRN - CNP   10 mg at 25   • enoxaparin (LOVENOX) injection 40 mg  40 mg SubCUTAneous Daily Kanwal Siegel APRN - CNP   40 mg at 25   • pantoprazole (PROTONIX) 40 mg in sodium chloride (PF) 0.9 % 10 mL injection  40 mg IntraVENous Daily Kanwal Siegel,

## 2025-02-07 ENCOUNTER — APPOINTMENT (OUTPATIENT)
Dept: CT IMAGING | Age: 65
DRG: 023 | End: 2025-02-07
Payer: MEDICARE

## 2025-02-07 ENCOUNTER — APPOINTMENT (OUTPATIENT)
Dept: GENERAL RADIOLOGY | Age: 65
DRG: 023 | End: 2025-02-07
Payer: MEDICARE

## 2025-02-07 ENCOUNTER — APPOINTMENT (OUTPATIENT)
Dept: MRI IMAGING | Age: 65
DRG: 023 | End: 2025-02-07
Payer: MEDICARE

## 2025-02-07 LAB
ANION GAP SERPL CALCULATED.3IONS-SCNC: 12 MMOL/L (ref 9–16)
BASOPHILS # BLD: 0.18 K/UL (ref 0–0.2)
BASOPHILS NFR BLD: 1 % (ref 0–2)
BUN SERPL-MCNC: 33 MG/DL (ref 8–23)
CALCIUM SERPL-MCNC: 9.9 MG/DL (ref 8.6–10.4)
CHLORIDE SERPL-SCNC: 110 MMOL/L (ref 98–107)
CO2 SERPL-SCNC: 24 MMOL/L (ref 20–31)
CREAT SERPL-MCNC: 0.9 MG/DL (ref 0.7–1.2)
EOSINOPHIL # BLD: 0.36 K/UL (ref 0–0.4)
EOSINOPHILS RELATIVE PERCENT: 2 % (ref 1–4)
ERYTHROCYTE [DISTWIDTH] IN BLOOD BY AUTOMATED COUNT: 12.3 % (ref 11.8–14.4)
GFR, ESTIMATED: >90 ML/MIN/1.73M2
GLUCOSE SERPL-MCNC: 122 MG/DL (ref 74–99)
HCT VFR BLD AUTO: 52 % (ref 40.7–50.3)
HGB BLD-MCNC: 16.8 G/DL (ref 13–17)
IMM GRANULOCYTES # BLD AUTO: 0 K/UL (ref 0–0.3)
IMM GRANULOCYTES NFR BLD: 0 %
LYMPHOCYTES NFR BLD: 4.5 K/UL (ref 1–4.8)
LYMPHOCYTES RELATIVE PERCENT: 25 % (ref 24–44)
MCH RBC QN AUTO: 30.9 PG (ref 25.2–33.5)
MCHC RBC AUTO-ENTMCNC: 32.3 G/DL (ref 28.4–34.8)
MCV RBC AUTO: 95.6 FL (ref 82.6–102.9)
MICROORGANISM SPEC CULT: NORMAL
MICROORGANISM SPEC CULT: NORMAL
MONOCYTES NFR BLD: 12 % (ref 1–7)
MONOCYTES NFR BLD: 2.16 K/UL (ref 0.1–0.8)
MORPHOLOGY: NORMAL
NEUTROPHILS NFR BLD: 60 % (ref 36–66)
NEUTS SEG NFR BLD: 10.8 K/UL (ref 1.8–7.7)
NRBC BLD-RTO: 0 PER 100 WBC
PLATELET # BLD AUTO: 304 K/UL (ref 138–453)
PMV BLD AUTO: 11.4 FL (ref 8.1–13.5)
POTASSIUM SERPL-SCNC: 4.4 MMOL/L (ref 3.7–5.3)
RBC # BLD AUTO: 5.44 M/UL (ref 4.21–5.77)
SERVICE CMNT-IMP: NORMAL
SERVICE CMNT-IMP: NORMAL
SODIUM SERPL-SCNC: 146 MMOL/L (ref 136–145)
SPECIMEN DESCRIPTION: NORMAL
SPECIMEN DESCRIPTION: NORMAL
T4 SERPL-MCNC: 9.1 UG/DL (ref 4.5–11.7)
WBC OTHER # BLD: 18 K/UL (ref 3.5–11.3)

## 2025-02-07 PROCEDURE — 6370000000 HC RX 637 (ALT 250 FOR IP)

## 2025-02-07 PROCEDURE — 2700000000 HC OXYGEN THERAPY PER DAY

## 2025-02-07 PROCEDURE — 2500000003 HC RX 250 WO HCPCS: Performed by: STUDENT IN AN ORGANIZED HEALTH CARE EDUCATION/TRAINING PROGRAM

## 2025-02-07 PROCEDURE — 99233 SBSQ HOSP IP/OBS HIGH 50: CPT | Performed by: PSYCHIATRY & NEUROLOGY

## 2025-02-07 PROCEDURE — 6370000000 HC RX 637 (ALT 250 FOR IP): Performed by: STUDENT IN AN ORGANIZED HEALTH CARE EDUCATION/TRAINING PROGRAM

## 2025-02-07 PROCEDURE — 85025 COMPLETE CBC W/AUTO DIFF WBC: CPT

## 2025-02-07 PROCEDURE — 2580000003 HC RX 258

## 2025-02-07 PROCEDURE — 2580000003 HC RX 258: Performed by: STUDENT IN AN ORGANIZED HEALTH CARE EDUCATION/TRAINING PROGRAM

## 2025-02-07 PROCEDURE — 70450 CT HEAD/BRAIN W/O DYE: CPT

## 2025-02-07 PROCEDURE — G0545 PR INHERENT VISIT TO INPT: HCPCS | Performed by: INTERNAL MEDICINE

## 2025-02-07 PROCEDURE — 70486 CT MAXILLOFACIAL W/O DYE: CPT

## 2025-02-07 PROCEDURE — 94761 N-INVAS EAR/PLS OXIMETRY MLT: CPT

## 2025-02-07 PROCEDURE — 6360000004 HC RX CONTRAST MEDICATION

## 2025-02-07 PROCEDURE — 80048 BASIC METABOLIC PNL TOTAL CA: CPT

## 2025-02-07 PROCEDURE — 36415 COLL VENOUS BLD VENIPUNCTURE: CPT

## 2025-02-07 PROCEDURE — 99232 SBSQ HOSP IP/OBS MODERATE 35: CPT | Performed by: PHYSICAL MEDICINE & REHABILITATION

## 2025-02-07 PROCEDURE — 6360000002 HC RX W HCPCS: Performed by: STUDENT IN AN ORGANIZED HEALTH CARE EDUCATION/TRAINING PROGRAM

## 2025-02-07 PROCEDURE — 99232 SBSQ HOSP IP/OBS MODERATE 35: CPT | Performed by: PSYCHIATRY & NEUROLOGY

## 2025-02-07 PROCEDURE — 71045 X-RAY EXAM CHEST 1 VIEW: CPT

## 2025-02-07 PROCEDURE — 2060000000 HC ICU INTERMEDIATE R&B

## 2025-02-07 PROCEDURE — 70551 MRI BRAIN STEM W/O DYE: CPT

## 2025-02-07 PROCEDURE — 94640 AIRWAY INHALATION TREATMENT: CPT

## 2025-02-07 PROCEDURE — 99232 SBSQ HOSP IP/OBS MODERATE 35: CPT | Performed by: INTERNAL MEDICINE

## 2025-02-07 PROCEDURE — 6360000002 HC RX W HCPCS

## 2025-02-07 PROCEDURE — 84436 ASSAY OF TOTAL THYROXINE: CPT

## 2025-02-07 PROCEDURE — 70498 CT ANGIOGRAPHY NECK: CPT

## 2025-02-07 RX ORDER — IOPAMIDOL 755 MG/ML
75 INJECTION, SOLUTION INTRAVASCULAR
Status: COMPLETED | OUTPATIENT
Start: 2025-02-07 | End: 2025-02-07

## 2025-02-07 RX ORDER — ALBUTEROL SULFATE 0.83 MG/ML
2.5 SOLUTION RESPIRATORY (INHALATION)
Status: DISCONTINUED | OUTPATIENT
Start: 2025-02-07 | End: 2025-02-13

## 2025-02-07 RX ORDER — ALBUTEROL SULFATE 90 UG/1
2 INHALANT RESPIRATORY (INHALATION) EVERY 4 HOURS PRN
Status: DISCONTINUED | OUTPATIENT
Start: 2025-02-07 | End: 2025-02-18 | Stop reason: HOSPADM

## 2025-02-07 RX ADMIN — SODIUM CHLORIDE, PRESERVATIVE FREE 40 MG: 5 INJECTION INTRAVENOUS at 08:58

## 2025-02-07 RX ADMIN — ENOXAPARIN SODIUM 40 MG: 100 INJECTION SUBCUTANEOUS at 16:34

## 2025-02-07 RX ADMIN — Medication 500000 UNITS: at 08:58

## 2025-02-07 RX ADMIN — IOPAMIDOL 75 ML: 755 INJECTION, SOLUTION INTRAVENOUS at 17:34

## 2025-02-07 RX ADMIN — AMLODIPINE BESYLATE 10 MG: 10 TABLET ORAL at 08:58

## 2025-02-07 RX ADMIN — ALBUTEROL SULFATE 2.5 MG: 2.5 SOLUTION RESPIRATORY (INHALATION) at 12:29

## 2025-02-07 RX ADMIN — SODIUM CHLORIDE 500 ML: 0.45 INJECTION, SOLUTION INTRAVENOUS at 09:25

## 2025-02-07 RX ADMIN — LISINOPRIL 20 MG: 20 TABLET ORAL at 08:58

## 2025-02-07 RX ADMIN — BISACODYL 10 MG: 10 SUPPOSITORY RECTAL at 16:32

## 2025-02-07 RX ADMIN — Medication 500000 UNITS: at 16:32

## 2025-02-07 RX ADMIN — ALBUTEROL SULFATE 2.5 MG: 2.5 SOLUTION RESPIRATORY (INHALATION) at 16:01

## 2025-02-07 RX ADMIN — SENNOSIDES AND DOCUSATE SODIUM 2 TABLET: 50; 8.6 TABLET ORAL at 16:32

## 2025-02-07 RX ADMIN — SODIUM CHLORIDE, PRESERVATIVE FREE 10 ML: 5 INJECTION INTRAVENOUS at 08:59

## 2025-02-07 RX ADMIN — SODIUM CHLORIDE, PRESERVATIVE FREE 5 ML: 5 INJECTION INTRAVENOUS at 22:06

## 2025-02-07 RX ADMIN — POLYETHYLENE GLYCOL 3350 17 G: 17 POWDER, FOR SOLUTION ORAL at 16:32

## 2025-02-07 NOTE — CARE COORDINATION
Dx: Intracranial hemorrhage (HCC) [I62.9]  Hemorrhagic stroke (HCC) [I61.9]  Hypertensive emergency [I16.1]    Admit date: 1/28/2025  Hospital day:10      ______________________________________      Patients goal/ Transitional Planning:   Spoke with patients wife, plan is for SNF, choices are- #1 [facility], #2 [facility], #3 [facility].  Referrals sent    PT/OT recommendations: SNF        Continued needs/services: Facility, hugo, HENS, transport      Barriers to discharge: Facilty        Anticipated Discharge Date:         [facility] RISK OF UNPLANNED READMISSION 2.0             13.4 Total Score      1511 Spoke with Timoteo from [facility], ne beds available

## 2025-02-08 ENCOUNTER — APPOINTMENT (OUTPATIENT)
Dept: CT IMAGING | Age: 65
DRG: 023 | End: 2025-02-08
Payer: MEDICARE

## 2025-02-08 PROBLEM — I61.0 THALAMIC HEMORRHAGE (HCC): Status: ACTIVE | Noted: 2025-02-08

## 2025-02-08 PROBLEM — I61.5 IVH (INTRAVENTRICULAR HEMORRHAGE) (HCC): Status: ACTIVE | Noted: 2025-02-08

## 2025-02-08 PROBLEM — R90.89 MIDLINE SHIFT OF BRAIN: Status: ACTIVE | Noted: 2025-02-08

## 2025-02-08 LAB
ANION GAP SERPL CALCULATED.3IONS-SCNC: 14 MMOL/L (ref 9–16)
BASOPHILS # BLD: 0.08 K/UL (ref 0–0.2)
BASOPHILS NFR BLD: 0 % (ref 0–2)
BUN SERPL-MCNC: 36 MG/DL (ref 8–23)
CALCIUM SERPL-MCNC: 10.3 MG/DL (ref 8.6–10.4)
CHLORIDE SERPL-SCNC: 109 MMOL/L (ref 98–107)
CO2 SERPL-SCNC: 25 MMOL/L (ref 20–31)
CREAT SERPL-MCNC: 1 MG/DL (ref 0.7–1.2)
EOSINOPHIL # BLD: 0.31 K/UL (ref 0–0.44)
EOSINOPHILS RELATIVE PERCENT: 2 % (ref 1–4)
ERYTHROCYTE [DISTWIDTH] IN BLOOD BY AUTOMATED COUNT: 12.2 % (ref 11.8–14.4)
GFR, ESTIMATED: 84 ML/MIN/1.73M2
GLUCOSE SERPL-MCNC: 150 MG/DL (ref 74–99)
HCT VFR BLD AUTO: 53.3 % (ref 40.7–50.3)
HGB BLD-MCNC: 17.2 G/DL (ref 13–17)
IMM GRANULOCYTES # BLD AUTO: 0.11 K/UL (ref 0–0.3)
IMM GRANULOCYTES NFR BLD: 1 %
LYMPHOCYTES NFR BLD: 2.41 K/UL (ref 1.1–3.7)
LYMPHOCYTES RELATIVE PERCENT: 12 % (ref 24–43)
MCH RBC QN AUTO: 31.4 PG (ref 25.2–33.5)
MCHC RBC AUTO-ENTMCNC: 32.3 G/DL (ref 28.4–34.8)
MCV RBC AUTO: 97.3 FL (ref 82.6–102.9)
MONOCYTES NFR BLD: 1.44 K/UL (ref 0.1–1.2)
MONOCYTES NFR BLD: 7 % (ref 3–12)
NEUTROPHILS NFR BLD: 78 % (ref 36–65)
NEUTS SEG NFR BLD: 15.72 K/UL (ref 1.5–8.1)
NRBC BLD-RTO: 0 PER 100 WBC
PLATELET # BLD AUTO: 277 K/UL (ref 138–453)
PMV BLD AUTO: 11.8 FL (ref 8.1–13.5)
POTASSIUM SERPL-SCNC: 4.2 MMOL/L (ref 3.7–5.3)
RBC # BLD AUTO: 5.48 M/UL (ref 4.21–5.77)
SODIUM SERPL-SCNC: 148 MMOL/L (ref 136–145)
WBC OTHER # BLD: 20.1 K/UL (ref 3.5–11.3)

## 2025-02-08 PROCEDURE — 2580000003 HC RX 258: Performed by: STUDENT IN AN ORGANIZED HEALTH CARE EDUCATION/TRAINING PROGRAM

## 2025-02-08 PROCEDURE — 36415 COLL VENOUS BLD VENIPUNCTURE: CPT

## 2025-02-08 PROCEDURE — 94761 N-INVAS EAR/PLS OXIMETRY MLT: CPT

## 2025-02-08 PROCEDURE — 99232 SBSQ HOSP IP/OBS MODERATE 35: CPT | Performed by: INTERNAL MEDICINE

## 2025-02-08 PROCEDURE — 2500000003 HC RX 250 WO HCPCS: Performed by: STUDENT IN AN ORGANIZED HEALTH CARE EDUCATION/TRAINING PROGRAM

## 2025-02-08 PROCEDURE — G0545 PR INHERENT VISIT TO INPT: HCPCS | Performed by: INTERNAL MEDICINE

## 2025-02-08 PROCEDURE — 85025 COMPLETE CBC W/AUTO DIFF WBC: CPT

## 2025-02-08 PROCEDURE — 71250 CT THORAX DX C-: CPT

## 2025-02-08 PROCEDURE — 6360000002 HC RX W HCPCS: Performed by: STUDENT IN AN ORGANIZED HEALTH CARE EDUCATION/TRAINING PROGRAM

## 2025-02-08 PROCEDURE — 99233 SBSQ HOSP IP/OBS HIGH 50: CPT | Performed by: PSYCHIATRY & NEUROLOGY

## 2025-02-08 PROCEDURE — APPSS15 APP SPLIT SHARED TIME 0-15 MINUTES: Performed by: NURSE PRACTITIONER

## 2025-02-08 PROCEDURE — 80048 BASIC METABOLIC PNL TOTAL CA: CPT

## 2025-02-08 PROCEDURE — 2580000003 HC RX 258

## 2025-02-08 PROCEDURE — 6370000000 HC RX 637 (ALT 250 FOR IP): Performed by: STUDENT IN AN ORGANIZED HEALTH CARE EDUCATION/TRAINING PROGRAM

## 2025-02-08 PROCEDURE — 2700000000 HC OXYGEN THERAPY PER DAY

## 2025-02-08 PROCEDURE — 70450 CT HEAD/BRAIN W/O DYE: CPT

## 2025-02-08 PROCEDURE — 6370000000 HC RX 637 (ALT 250 FOR IP)

## 2025-02-08 PROCEDURE — 94640 AIRWAY INHALATION TREATMENT: CPT

## 2025-02-08 PROCEDURE — 99232 SBSQ HOSP IP/OBS MODERATE 35: CPT | Performed by: PSYCHIATRY & NEUROLOGY

## 2025-02-08 PROCEDURE — 2060000000 HC ICU INTERMEDIATE R&B

## 2025-02-08 PROCEDURE — 6360000002 HC RX W HCPCS

## 2025-02-08 RX ORDER — SODIUM CHLORIDE 450 MG/100ML
INJECTION, SOLUTION INTRAVENOUS CONTINUOUS
Status: DISCONTINUED | OUTPATIENT
Start: 2025-02-08 | End: 2025-02-08

## 2025-02-08 RX ADMIN — SODIUM CHLORIDE: 0.45 INJECTION, SOLUTION INTRAVENOUS at 12:29

## 2025-02-08 RX ADMIN — LISINOPRIL 20 MG: 20 TABLET ORAL at 07:52

## 2025-02-08 RX ADMIN — ALBUTEROL SULFATE 2.5 MG: 2.5 SOLUTION RESPIRATORY (INHALATION) at 20:05

## 2025-02-08 RX ADMIN — ALBUTEROL SULFATE 2.5 MG: 2.5 SOLUTION RESPIRATORY (INHALATION) at 07:42

## 2025-02-08 RX ADMIN — ENOXAPARIN SODIUM 40 MG: 100 INJECTION SUBCUTANEOUS at 07:52

## 2025-02-08 RX ADMIN — BISACODYL 10 MG: 10 SUPPOSITORY RECTAL at 07:52

## 2025-02-08 RX ADMIN — SODIUM CHLORIDE, PRESERVATIVE FREE 10 ML: 5 INJECTION INTRAVENOUS at 07:53

## 2025-02-08 RX ADMIN — SENNOSIDES AND DOCUSATE SODIUM 2 TABLET: 50; 8.6 TABLET ORAL at 07:51

## 2025-02-08 RX ADMIN — SODIUM CHLORIDE, PRESERVATIVE FREE 40 MG: 5 INJECTION INTRAVENOUS at 07:52

## 2025-02-08 RX ADMIN — ALBUTEROL SULFATE 2.5 MG: 2.5 SOLUTION RESPIRATORY (INHALATION) at 15:26

## 2025-02-08 RX ADMIN — Medication 500000 UNITS: at 19:40

## 2025-02-08 RX ADMIN — POLYETHYLENE GLYCOL 3350 17 G: 17 POWDER, FOR SOLUTION ORAL at 07:51

## 2025-02-08 RX ADMIN — Medication 500000 UNITS: at 17:33

## 2025-02-08 RX ADMIN — ALBUTEROL SULFATE 2.5 MG: 2.5 SOLUTION RESPIRATORY (INHALATION) at 11:02

## 2025-02-08 RX ADMIN — Medication 500000 UNITS: at 07:51

## 2025-02-08 RX ADMIN — AMLODIPINE BESYLATE 10 MG: 10 TABLET ORAL at 07:53

## 2025-02-08 RX ADMIN — SODIUM CHLORIDE, PRESERVATIVE FREE 10 ML: 5 INJECTION INTRAVENOUS at 19:40

## 2025-02-08 RX ADMIN — HYDROCHLOROTHIAZIDE 12.5 MG: 25 TABLET ORAL at 07:51

## 2025-02-09 LAB
ANION GAP SERPL CALCULATED.3IONS-SCNC: 13 MMOL/L (ref 9–16)
BASOPHILS # BLD: 0.05 K/UL (ref 0–0.2)
BASOPHILS NFR BLD: 0 % (ref 0–2)
BUN SERPL-MCNC: 42 MG/DL (ref 8–23)
CALCIUM SERPL-MCNC: 10.1 MG/DL (ref 8.6–10.4)
CHLORIDE SERPL-SCNC: 114 MMOL/L (ref 98–107)
CO2 SERPL-SCNC: 23 MMOL/L (ref 20–31)
CREAT SERPL-MCNC: 1.1 MG/DL (ref 0.7–1.2)
EOSINOPHIL # BLD: 0.2 K/UL (ref 0–0.44)
EOSINOPHILS RELATIVE PERCENT: 1 % (ref 1–4)
ERYTHROCYTE [DISTWIDTH] IN BLOOD BY AUTOMATED COUNT: 12.1 % (ref 11.8–14.4)
GFR, ESTIMATED: 74 ML/MIN/1.73M2
GLUCOSE SERPL-MCNC: 134 MG/DL (ref 74–99)
HCT VFR BLD AUTO: 51.2 % (ref 40.7–50.3)
HGB BLD-MCNC: 16.4 G/DL (ref 13–17)
IMM GRANULOCYTES # BLD AUTO: 0.1 K/UL (ref 0–0.3)
IMM GRANULOCYTES NFR BLD: 1 %
LYMPHOCYTES NFR BLD: 2.59 K/UL (ref 1.1–3.7)
LYMPHOCYTES RELATIVE PERCENT: 14 % (ref 24–43)
MCH RBC QN AUTO: 30.6 PG (ref 25.2–33.5)
MCHC RBC AUTO-ENTMCNC: 32 G/DL (ref 28.4–34.8)
MCV RBC AUTO: 95.5 FL (ref 82.6–102.9)
MONOCYTES NFR BLD: 1.42 K/UL (ref 0.1–1.2)
MONOCYTES NFR BLD: 8 % (ref 3–12)
NEUTROPHILS NFR BLD: 76 % (ref 36–65)
NEUTS SEG NFR BLD: 14.11 K/UL (ref 1.5–8.1)
NRBC BLD-RTO: 0 PER 100 WBC
PLATELET # BLD AUTO: 322 K/UL (ref 138–453)
PMV BLD AUTO: 11.5 FL (ref 8.1–13.5)
POTASSIUM SERPL-SCNC: 3.9 MMOL/L (ref 3.7–5.3)
RBC # BLD AUTO: 5.36 M/UL (ref 4.21–5.77)
SODIUM SERPL-SCNC: 150 MMOL/L (ref 136–145)
SODIUM SERPL-SCNC: 152 MMOL/L (ref 136–145)
WBC OTHER # BLD: 18.5 K/UL (ref 3.5–11.3)

## 2025-02-09 PROCEDURE — 6370000000 HC RX 637 (ALT 250 FOR IP): Performed by: STUDENT IN AN ORGANIZED HEALTH CARE EDUCATION/TRAINING PROGRAM

## 2025-02-09 PROCEDURE — 99232 SBSQ HOSP IP/OBS MODERATE 35: CPT | Performed by: PSYCHIATRY & NEUROLOGY

## 2025-02-09 PROCEDURE — 6370000000 HC RX 637 (ALT 250 FOR IP)

## 2025-02-09 PROCEDURE — 80048 BASIC METABOLIC PNL TOTAL CA: CPT

## 2025-02-09 PROCEDURE — 97110 THERAPEUTIC EXERCISES: CPT

## 2025-02-09 PROCEDURE — 2060000000 HC ICU INTERMEDIATE R&B

## 2025-02-09 PROCEDURE — 2500000003 HC RX 250 WO HCPCS: Performed by: STUDENT IN AN ORGANIZED HEALTH CARE EDUCATION/TRAINING PROGRAM

## 2025-02-09 PROCEDURE — 6360000002 HC RX W HCPCS

## 2025-02-09 PROCEDURE — G0545 PR INHERENT VISIT TO INPT: HCPCS | Performed by: INTERNAL MEDICINE

## 2025-02-09 PROCEDURE — 31720 CLEARANCE OF AIRWAYS: CPT

## 2025-02-09 PROCEDURE — 94761 N-INVAS EAR/PLS OXIMETRY MLT: CPT

## 2025-02-09 PROCEDURE — APPSS15 APP SPLIT SHARED TIME 0-15 MINUTES: Performed by: NURSE PRACTITIONER

## 2025-02-09 PROCEDURE — 84295 ASSAY OF SERUM SODIUM: CPT

## 2025-02-09 PROCEDURE — 2580000003 HC RX 258: Performed by: STUDENT IN AN ORGANIZED HEALTH CARE EDUCATION/TRAINING PROGRAM

## 2025-02-09 PROCEDURE — 99232 SBSQ HOSP IP/OBS MODERATE 35: CPT | Performed by: INTERNAL MEDICINE

## 2025-02-09 PROCEDURE — 97530 THERAPEUTIC ACTIVITIES: CPT

## 2025-02-09 PROCEDURE — 97112 NEUROMUSCULAR REEDUCATION: CPT

## 2025-02-09 PROCEDURE — 92507 TX SP LANG VOICE COMM INDIV: CPT

## 2025-02-09 PROCEDURE — 6360000002 HC RX W HCPCS: Performed by: STUDENT IN AN ORGANIZED HEALTH CARE EDUCATION/TRAINING PROGRAM

## 2025-02-09 PROCEDURE — 97535 SELF CARE MNGMENT TRAINING: CPT

## 2025-02-09 PROCEDURE — 94640 AIRWAY INHALATION TREATMENT: CPT

## 2025-02-09 PROCEDURE — 85025 COMPLETE CBC W/AUTO DIFF WBC: CPT

## 2025-02-09 PROCEDURE — 84145 PROCALCITONIN (PCT): CPT

## 2025-02-09 PROCEDURE — 36415 COLL VENOUS BLD VENIPUNCTURE: CPT

## 2025-02-09 RX ADMIN — Medication 500000 UNITS: at 08:45

## 2025-02-09 RX ADMIN — IPRATROPIUM BROMIDE AND ALBUTEROL SULFATE 1 DOSE: .5; 2.5 SOLUTION RESPIRATORY (INHALATION) at 17:43

## 2025-02-09 RX ADMIN — SENNOSIDES AND DOCUSATE SODIUM 2 TABLET: 50; 8.6 TABLET ORAL at 08:46

## 2025-02-09 RX ADMIN — SODIUM CHLORIDE, PRESERVATIVE FREE 10 ML: 5 INJECTION INTRAVENOUS at 20:25

## 2025-02-09 RX ADMIN — ALBUTEROL SULFATE 2.5 MG: 2.5 SOLUTION RESPIRATORY (INHALATION) at 08:17

## 2025-02-09 RX ADMIN — ENOXAPARIN SODIUM 40 MG: 100 INJECTION SUBCUTANEOUS at 08:45

## 2025-02-09 RX ADMIN — ACETAMINOPHEN 650 MG: 325 TABLET ORAL at 11:09

## 2025-02-09 RX ADMIN — ALBUTEROL SULFATE 2.5 MG: 2.5 SOLUTION RESPIRATORY (INHALATION) at 11:56

## 2025-02-09 RX ADMIN — ACETAMINOPHEN 650 MG: 325 TABLET ORAL at 20:12

## 2025-02-09 RX ADMIN — BISACODYL 10 MG: 10 SUPPOSITORY RECTAL at 08:48

## 2025-02-09 RX ADMIN — Medication 500000 UNITS: at 11:14

## 2025-02-09 RX ADMIN — SODIUM CHLORIDE, PRESERVATIVE FREE 10 ML: 5 INJECTION INTRAVENOUS at 08:50

## 2025-02-09 RX ADMIN — SODIUM CHLORIDE, PRESERVATIVE FREE 40 MG: 5 INJECTION INTRAVENOUS at 08:49

## 2025-02-09 RX ADMIN — Medication 500000 UNITS: at 20:23

## 2025-02-09 RX ADMIN — POLYETHYLENE GLYCOL 3350 17 G: 17 POWDER, FOR SOLUTION ORAL at 08:45

## 2025-02-09 RX ADMIN — ALBUTEROL SULFATE 2.5 MG: 2.5 SOLUTION RESPIRATORY (INHALATION) at 20:13

## 2025-02-09 ASSESSMENT — PAIN DESCRIPTION - LOCATION: LOCATION: HEAD

## 2025-02-09 ASSESSMENT — PAIN SCALES - WONG BAKER: WONGBAKER_NUMERICALRESPONSE: NO HURT

## 2025-02-09 ASSESSMENT — PAIN DESCRIPTION - ORIENTATION: ORIENTATION: LEFT

## 2025-02-09 ASSESSMENT — PAIN - FUNCTIONAL ASSESSMENT: PAIN_FUNCTIONAL_ASSESSMENT: PREVENTS OR INTERFERES WITH ALL ACTIVE AND SOME PASSIVE ACTIVITIES

## 2025-02-09 ASSESSMENT — PAIN SCALES - GENERAL: PAINLEVEL_OUTOF10: 0

## 2025-02-09 ASSESSMENT — PAIN DESCRIPTION - DESCRIPTORS: DESCRIPTORS: ACHING

## 2025-02-10 ENCOUNTER — APPOINTMENT (OUTPATIENT)
Dept: GENERAL RADIOLOGY | Age: 65
DRG: 023 | End: 2025-02-10
Payer: MEDICARE

## 2025-02-10 LAB
ANION GAP SERPL CALCULATED.3IONS-SCNC: 12 MMOL/L (ref 9–16)
BASOPHILS # BLD: 0 K/UL (ref 0–0.2)
BASOPHILS NFR BLD: 0 % (ref 0–2)
BUN SERPL-MCNC: 45 MG/DL (ref 8–23)
CALCIUM SERPL-MCNC: 10.1 MG/DL (ref 8.6–10.4)
CHLORIDE SERPL-SCNC: 114 MMOL/L (ref 98–107)
CO2 SERPL-SCNC: 26 MMOL/L (ref 20–31)
CREAT SERPL-MCNC: 1.1 MG/DL (ref 0.7–1.2)
CRP SERPL HS-MCNC: 43.8 MG/L (ref 0–5)
EOSINOPHIL # BLD: 0.7 K/UL (ref 0–0.4)
EOSINOPHILS RELATIVE PERCENT: 3 % (ref 1–4)
ERYTHROCYTE [DISTWIDTH] IN BLOOD BY AUTOMATED COUNT: 12.1 % (ref 11.8–14.4)
GFR, ESTIMATED: 74 ML/MIN/1.73M2
GLUCOSE SERPL-MCNC: 138 MG/DL (ref 74–99)
HCT VFR BLD AUTO: 53 % (ref 40.7–50.3)
HGB BLD-MCNC: 16.6 G/DL (ref 13–17)
IMM GRANULOCYTES # BLD AUTO: 0 K/UL (ref 0–0.3)
IMM GRANULOCYTES NFR BLD: 0 %
LYMPHOCYTES NFR BLD: 3.48 K/UL (ref 1–4.8)
LYMPHOCYTES RELATIVE PERCENT: 15 % (ref 24–44)
MCH RBC QN AUTO: 30.7 PG (ref 25.2–33.5)
MCHC RBC AUTO-ENTMCNC: 31.3 G/DL (ref 28.4–34.8)
MCV RBC AUTO: 98 FL (ref 82.6–102.9)
MONOCYTES NFR BLD: 11 % (ref 1–7)
MONOCYTES NFR BLD: 2.55 K/UL (ref 0.1–0.8)
MORPHOLOGY: NORMAL
NEUTROPHILS NFR BLD: 71 % (ref 36–66)
NEUTS SEG NFR BLD: 16.47 K/UL (ref 1.8–7.7)
NRBC BLD-RTO: 0 PER 100 WBC
PLATELET # BLD AUTO: 299 K/UL (ref 138–453)
PMV BLD AUTO: 11.8 FL (ref 8.1–13.5)
POTASSIUM SERPL-SCNC: 3.7 MMOL/L (ref 3.7–5.3)
PROCALCITONIN SERPL-MCNC: 0.09 NG/ML (ref 0–0.09)
RBC # BLD AUTO: 5.41 M/UL (ref 4.21–5.77)
SODIUM SERPL-SCNC: 152 MMOL/L (ref 136–145)
SODIUM SERPL-SCNC: 152 MMOL/L (ref 136–145)
SODIUM SERPL-SCNC: 153 MMOL/L (ref 136–145)
SODIUM SERPL-SCNC: 154 MMOL/L (ref 136–145)
WBC OTHER # BLD: 23.2 K/UL (ref 3.5–11.3)

## 2025-02-10 PROCEDURE — 94640 AIRWAY INHALATION TREATMENT: CPT

## 2025-02-10 PROCEDURE — 86140 C-REACTIVE PROTEIN: CPT

## 2025-02-10 PROCEDURE — 6360000002 HC RX W HCPCS: Performed by: STUDENT IN AN ORGANIZED HEALTH CARE EDUCATION/TRAINING PROGRAM

## 2025-02-10 PROCEDURE — 2500000003 HC RX 250 WO HCPCS: Performed by: STUDENT IN AN ORGANIZED HEALTH CARE EDUCATION/TRAINING PROGRAM

## 2025-02-10 PROCEDURE — 92507 TX SP LANG VOICE COMM INDIV: CPT

## 2025-02-10 PROCEDURE — 85025 COMPLETE CBC W/AUTO DIFF WBC: CPT

## 2025-02-10 PROCEDURE — 99233 SBSQ HOSP IP/OBS HIGH 50: CPT | Performed by: PSYCHIATRY & NEUROLOGY

## 2025-02-10 PROCEDURE — 36415 COLL VENOUS BLD VENIPUNCTURE: CPT

## 2025-02-10 PROCEDURE — 6370000000 HC RX 637 (ALT 250 FOR IP): Performed by: STUDENT IN AN ORGANIZED HEALTH CARE EDUCATION/TRAINING PROGRAM

## 2025-02-10 PROCEDURE — 71045 X-RAY EXAM CHEST 1 VIEW: CPT

## 2025-02-10 PROCEDURE — 2580000003 HC RX 258: Performed by: STUDENT IN AN ORGANIZED HEALTH CARE EDUCATION/TRAINING PROGRAM

## 2025-02-10 PROCEDURE — 80048 BASIC METABOLIC PNL TOTAL CA: CPT

## 2025-02-10 PROCEDURE — 6370000000 HC RX 637 (ALT 250 FOR IP)

## 2025-02-10 PROCEDURE — 2060000000 HC ICU INTERMEDIATE R&B

## 2025-02-10 PROCEDURE — 6360000002 HC RX W HCPCS

## 2025-02-10 PROCEDURE — 84295 ASSAY OF SERUM SODIUM: CPT

## 2025-02-10 PROCEDURE — G0545 PR INHERENT VISIT TO INPT: HCPCS | Performed by: INTERNAL MEDICINE

## 2025-02-10 PROCEDURE — 99232 SBSQ HOSP IP/OBS MODERATE 35: CPT | Performed by: INTERNAL MEDICINE

## 2025-02-10 PROCEDURE — 94761 N-INVAS EAR/PLS OXIMETRY MLT: CPT

## 2025-02-10 RX ORDER — SODIUM CHLORIDE 9 MG/ML
INJECTION, SOLUTION INTRAVENOUS CONTINUOUS
Status: DISCONTINUED | OUTPATIENT
Start: 2025-02-10 | End: 2025-02-12

## 2025-02-10 RX ADMIN — AMLODIPINE BESYLATE 10 MG: 10 TABLET ORAL at 09:33

## 2025-02-10 RX ADMIN — ALBUTEROL SULFATE 2.5 MG: 2.5 SOLUTION RESPIRATORY (INHALATION) at 11:45

## 2025-02-10 RX ADMIN — POLYETHYLENE GLYCOL 3350 17 G: 17 POWDER, FOR SOLUTION ORAL at 09:33

## 2025-02-10 RX ADMIN — ALBUTEROL SULFATE 2.5 MG: 2.5 SOLUTION RESPIRATORY (INHALATION) at 16:28

## 2025-02-10 RX ADMIN — SENNOSIDES AND DOCUSATE SODIUM 2 TABLET: 50; 8.6 TABLET ORAL at 09:33

## 2025-02-10 RX ADMIN — SODIUM CHLORIDE, PRESERVATIVE FREE 40 MG: 5 INJECTION INTRAVENOUS at 09:33

## 2025-02-10 RX ADMIN — BISACODYL 10 MG: 10 SUPPOSITORY RECTAL at 09:33

## 2025-02-10 RX ADMIN — Medication 500000 UNITS: at 09:33

## 2025-02-10 RX ADMIN — HYDROCHLOROTHIAZIDE 12.5 MG: 25 TABLET ORAL at 09:38

## 2025-02-10 RX ADMIN — ALBUTEROL SULFATE 2.5 MG: 2.5 SOLUTION RESPIRATORY (INHALATION) at 20:01

## 2025-02-10 RX ADMIN — SODIUM CHLORIDE, PRESERVATIVE FREE 10 ML: 5 INJECTION INTRAVENOUS at 09:33

## 2025-02-10 RX ADMIN — ALBUTEROL SULFATE 2.5 MG: 2.5 SOLUTION RESPIRATORY (INHALATION) at 08:18

## 2025-02-10 RX ADMIN — LISINOPRIL 20 MG: 20 TABLET ORAL at 09:33

## 2025-02-10 NOTE — CARE COORDINATION
Dx: Intracranial hemorrhage (HCC) [I62.9]  Hemorrhagic stroke (HCC) [I61.9]  Hypertensive emergency [I16.1]    Admit date: 1/28/2025  GMLOS :  LOS :    [facility] RISK OF UNPLANNED READMISSION 2.0             13.8 Total Score        ______________________________________      Patients goal/ Transitional Planning :   Called and left a VM with [facility] regarding the referral status, requesting a callback.       PT/OT recommendations : SNF      Barriers to discharge : Need accepting facility.       Conversations : N/A

## 2025-02-11 ENCOUNTER — ANESTHESIA (OUTPATIENT)
Dept: OPERATING ROOM | Age: 65
End: 2025-02-11
Payer: MEDICARE

## 2025-02-11 ENCOUNTER — ANESTHESIA EVENT (OUTPATIENT)
Dept: OPERATING ROOM | Age: 65
End: 2025-02-11
Payer: MEDICARE

## 2025-02-11 LAB
ANION GAP SERPL CALCULATED.3IONS-SCNC: 12 MMOL/L (ref 9–16)
BASOPHILS # BLD: 0.09 K/UL (ref 0–0.2)
BASOPHILS NFR BLD: 1 % (ref 0–2)
BUN SERPL-MCNC: 43 MG/DL (ref 8–23)
CALCIUM SERPL-MCNC: 10 MG/DL (ref 8.6–10.4)
CHLORIDE SERPL-SCNC: 119 MMOL/L (ref 98–107)
CO2 SERPL-SCNC: 24 MMOL/L (ref 20–31)
CREAT SERPL-MCNC: 1 MG/DL (ref 0.7–1.2)
EOSINOPHIL # BLD: 0.27 K/UL (ref 0–0.44)
EOSINOPHILS RELATIVE PERCENT: 2 % (ref 1–4)
ERYTHROCYTE [DISTWIDTH] IN BLOOD BY AUTOMATED COUNT: 12.1 % (ref 11.8–14.4)
GFR, ESTIMATED: 84 ML/MIN/1.73M2
GLUCOSE SERPL-MCNC: 104 MG/DL (ref 74–99)
HCT VFR BLD AUTO: 52.4 % (ref 40.7–50.3)
HGB BLD-MCNC: 15.9 G/DL (ref 13–17)
IMM GRANULOCYTES # BLD AUTO: 0.07 K/UL (ref 0–0.3)
IMM GRANULOCYTES NFR BLD: 1 %
LYMPHOCYTES NFR BLD: 2.49 K/UL (ref 1.1–3.7)
LYMPHOCYTES RELATIVE PERCENT: 18 % (ref 24–43)
MCH RBC QN AUTO: 30.9 PG (ref 25.2–33.5)
MCHC RBC AUTO-ENTMCNC: 30.3 G/DL (ref 28.4–34.8)
MCV RBC AUTO: 101.9 FL (ref 82.6–102.9)
MONOCYTES NFR BLD: 1.08 K/UL (ref 0.1–1.2)
MONOCYTES NFR BLD: 8 % (ref 3–12)
NEUTROPHILS NFR BLD: 72 % (ref 36–65)
NEUTS SEG NFR BLD: 10.18 K/UL (ref 1.5–8.1)
NRBC BLD-RTO: 0 PER 100 WBC
PLATELET # BLD AUTO: 266 K/UL (ref 138–453)
PMV BLD AUTO: 12 FL (ref 8.1–13.5)
POTASSIUM SERPL-SCNC: 4.1 MMOL/L (ref 3.7–5.3)
RBC # BLD AUTO: 5.14 M/UL (ref 4.21–5.77)
SODIUM SERPL-SCNC: 152 MMOL/L (ref 136–145)
SODIUM SERPL-SCNC: 155 MMOL/L (ref 136–145)
WBC OTHER # BLD: 14.2 K/UL (ref 3.5–11.3)

## 2025-02-11 PROCEDURE — 6370000000 HC RX 637 (ALT 250 FOR IP): Performed by: INTERNAL MEDICINE

## 2025-02-11 PROCEDURE — 3700000001 HC ADD 15 MINUTES (ANESTHESIA): Performed by: INTERNAL MEDICINE

## 2025-02-11 PROCEDURE — 36415 COLL VENOUS BLD VENIPUNCTURE: CPT

## 2025-02-11 PROCEDURE — 94761 N-INVAS EAR/PLS OXIMETRY MLT: CPT

## 2025-02-11 PROCEDURE — 2500000003 HC RX 250 WO HCPCS: Performed by: INTERNAL MEDICINE

## 2025-02-11 PROCEDURE — G0545 PR INHERENT VISIT TO INPT: HCPCS | Performed by: INTERNAL MEDICINE

## 2025-02-11 PROCEDURE — 0DH63UZ INSERTION OF FEEDING DEVICE INTO STOMACH, PERCUTANEOUS APPROACH: ICD-10-PCS | Performed by: INTERNAL MEDICINE

## 2025-02-11 PROCEDURE — 1200000000 HC SEMI PRIVATE

## 2025-02-11 PROCEDURE — 3700000000 HC ANESTHESIA ATTENDED CARE: Performed by: INTERNAL MEDICINE

## 2025-02-11 PROCEDURE — 99232 SBSQ HOSP IP/OBS MODERATE 35: CPT | Performed by: INTERNAL MEDICINE

## 2025-02-11 PROCEDURE — 6360000002 HC RX W HCPCS: Performed by: INTERNAL MEDICINE

## 2025-02-11 PROCEDURE — 6360000002 HC RX W HCPCS: Performed by: NURSE ANESTHETIST, CERTIFIED REGISTERED

## 2025-02-11 PROCEDURE — 85025 COMPLETE CBC W/AUTO DIFF WBC: CPT

## 2025-02-11 PROCEDURE — 2500000003 HC RX 250 WO HCPCS: Performed by: NURSE ANESTHETIST, CERTIFIED REGISTERED

## 2025-02-11 PROCEDURE — 2580000003 HC RX 258: Performed by: ANESTHESIOLOGY

## 2025-02-11 PROCEDURE — 3E0G76Z INTRODUCTION OF NUTRITIONAL SUBSTANCE INTO UPPER GI, VIA NATURAL OR ARTIFICIAL OPENING: ICD-10-PCS | Performed by: PSYCHIATRY & NEUROLOGY

## 2025-02-11 PROCEDURE — 2709999900 HC NON-CHARGEABLE SUPPLY: Performed by: INTERNAL MEDICINE

## 2025-02-11 PROCEDURE — 94640 AIRWAY INHALATION TREATMENT: CPT

## 2025-02-11 PROCEDURE — 80048 BASIC METABOLIC PNL TOTAL CA: CPT

## 2025-02-11 PROCEDURE — 99232 SBSQ HOSP IP/OBS MODERATE 35: CPT | Performed by: PSYCHIATRY & NEUROLOGY

## 2025-02-11 PROCEDURE — 7100000001 HC PACU RECOVERY - ADDTL 15 MIN: Performed by: INTERNAL MEDICINE

## 2025-02-11 PROCEDURE — 7100000000 HC PACU RECOVERY - FIRST 15 MIN: Performed by: INTERNAL MEDICINE

## 2025-02-11 PROCEDURE — 2580000003 HC RX 258: Performed by: INTERNAL MEDICINE

## 2025-02-11 PROCEDURE — 84295 ASSAY OF SERUM SODIUM: CPT

## 2025-02-11 PROCEDURE — 2720000010 HC SURG SUPPLY STERILE: Performed by: INTERNAL MEDICINE

## 2025-02-11 PROCEDURE — 43246 EGD PLACE GASTROSTOMY TUBE: CPT | Performed by: INTERNAL MEDICINE

## 2025-02-11 PROCEDURE — 3609013300 HC EGD TUBE PLACEMENT: Performed by: INTERNAL MEDICINE

## 2025-02-11 PROCEDURE — 2580000003 HC RX 258

## 2025-02-11 RX ORDER — PROPOFOL 10 MG/ML
INJECTION, EMULSION INTRAVENOUS
Status: DISCONTINUED | OUTPATIENT
Start: 2025-02-11 | End: 2025-02-11 | Stop reason: SDUPTHER

## 2025-02-11 RX ORDER — LIDOCAINE HYDROCHLORIDE 10 MG/ML
INJECTION, SOLUTION INFILTRATION; PERINEURAL PRN
Status: DISCONTINUED | OUTPATIENT
Start: 2025-02-11 | End: 2025-02-11 | Stop reason: HOSPADM

## 2025-02-11 RX ORDER — IPRATROPIUM BROMIDE AND ALBUTEROL SULFATE 2.5; .5 MG/3ML; MG/3ML
1 SOLUTION RESPIRATORY (INHALATION)
Status: DISCONTINUED | OUTPATIENT
Start: 2025-02-11 | End: 2025-02-11 | Stop reason: HOSPADM

## 2025-02-11 RX ORDER — SODIUM CHLORIDE 450 MG/100ML
INJECTION, SOLUTION INTRAVENOUS CONTINUOUS
Status: ACTIVE | OUTPATIENT
Start: 2025-02-11 | End: 2025-02-11

## 2025-02-11 RX ORDER — DIPHENHYDRAMINE HYDROCHLORIDE 50 MG/ML
12.5 INJECTION INTRAMUSCULAR; INTRAVENOUS
Status: DISCONTINUED | OUTPATIENT
Start: 2025-02-11 | End: 2025-02-11 | Stop reason: HOSPADM

## 2025-02-11 RX ORDER — PROCHLORPERAZINE EDISYLATE 5 MG/ML
5 INJECTION INTRAMUSCULAR; INTRAVENOUS
Status: DISCONTINUED | OUTPATIENT
Start: 2025-02-11 | End: 2025-02-11 | Stop reason: HOSPADM

## 2025-02-11 RX ORDER — SODIUM CHLORIDE 0.9 % (FLUSH) 0.9 %
5-40 SYRINGE (ML) INJECTION PRN
Status: DISCONTINUED | OUTPATIENT
Start: 2025-02-11 | End: 2025-02-11 | Stop reason: HOSPADM

## 2025-02-11 RX ORDER — LIDOCAINE HYDROCHLORIDE 10 MG/ML
INJECTION, SOLUTION EPIDURAL; INFILTRATION; INTRACAUDAL; PERINEURAL
Status: DISCONTINUED | OUTPATIENT
Start: 2025-02-11 | End: 2025-02-11 | Stop reason: SDUPTHER

## 2025-02-11 RX ORDER — EPHEDRINE SULFATE/0.9% NACL/PF 25 MG/5 ML
SYRINGE (ML) INTRAVENOUS
Status: DISCONTINUED | OUTPATIENT
Start: 2025-02-11 | End: 2025-02-11 | Stop reason: SDUPTHER

## 2025-02-11 RX ORDER — FENTANYL CITRATE 50 UG/ML
50 INJECTION, SOLUTION INTRAMUSCULAR; INTRAVENOUS EVERY 5 MIN PRN
Status: DISCONTINUED | OUTPATIENT
Start: 2025-02-11 | End: 2025-02-11 | Stop reason: HOSPADM

## 2025-02-11 RX ORDER — FENTANYL CITRATE 50 UG/ML
INJECTION, SOLUTION INTRAMUSCULAR; INTRAVENOUS
Status: DISCONTINUED | OUTPATIENT
Start: 2025-02-11 | End: 2025-02-11 | Stop reason: SDUPTHER

## 2025-02-11 RX ORDER — SODIUM CHLORIDE 9 MG/ML
INJECTION, SOLUTION INTRAVENOUS PRN
Status: DISCONTINUED | OUTPATIENT
Start: 2025-02-11 | End: 2025-02-11 | Stop reason: HOSPADM

## 2025-02-11 RX ORDER — NALOXONE HYDROCHLORIDE 0.4 MG/ML
INJECTION, SOLUTION INTRAMUSCULAR; INTRAVENOUS; SUBCUTANEOUS PRN
Status: DISCONTINUED | OUTPATIENT
Start: 2025-02-11 | End: 2025-02-11 | Stop reason: HOSPADM

## 2025-02-11 RX ORDER — SODIUM CHLORIDE 0.9 % (FLUSH) 0.9 %
5-40 SYRINGE (ML) INJECTION EVERY 12 HOURS SCHEDULED
Status: DISCONTINUED | OUTPATIENT
Start: 2025-02-11 | End: 2025-02-11 | Stop reason: HOSPADM

## 2025-02-11 RX ORDER — LABETALOL HYDROCHLORIDE 5 MG/ML
10 INJECTION, SOLUTION INTRAVENOUS
Status: DISCONTINUED | OUTPATIENT
Start: 2025-02-11 | End: 2025-02-11 | Stop reason: HOSPADM

## 2025-02-11 RX ORDER — HYDRALAZINE HYDROCHLORIDE 20 MG/ML
10 INJECTION INTRAMUSCULAR; INTRAVENOUS
Status: DISCONTINUED | OUTPATIENT
Start: 2025-02-11 | End: 2025-02-11 | Stop reason: HOSPADM

## 2025-02-11 RX ORDER — FENTANYL CITRATE 50 UG/ML
25 INJECTION, SOLUTION INTRAMUSCULAR; INTRAVENOUS EVERY 5 MIN PRN
Status: DISCONTINUED | OUTPATIENT
Start: 2025-02-11 | End: 2025-02-11 | Stop reason: HOSPADM

## 2025-02-11 RX ADMIN — ALBUTEROL SULFATE 2.5 MG: 2.5 SOLUTION RESPIRATORY (INHALATION) at 11:41

## 2025-02-11 RX ADMIN — SODIUM CHLORIDE: 0.45 INJECTION, SOLUTION INTRAVENOUS at 10:31

## 2025-02-11 RX ADMIN — SODIUM CHLORIDE: 9 INJECTION, SOLUTION INTRAVENOUS at 07:30

## 2025-02-11 RX ADMIN — ALBUTEROL SULFATE 2.5 MG: 2.5 SOLUTION RESPIRATORY (INHALATION) at 20:03

## 2025-02-11 RX ADMIN — ACETAMINOPHEN 650 MG: 325 TABLET ORAL at 21:09

## 2025-02-11 RX ADMIN — SODIUM CHLORIDE, PRESERVATIVE FREE 10 ML: 5 INJECTION INTRAVENOUS at 21:31

## 2025-02-11 RX ADMIN — Medication 500000 UNITS: at 15:51

## 2025-02-11 RX ADMIN — ALBUTEROL SULFATE 2.5 MG: 2.5 SOLUTION RESPIRATORY (INHALATION) at 15:32

## 2025-02-11 RX ADMIN — Medication 500000 UNITS: at 12:23

## 2025-02-11 RX ADMIN — PROPOFOL 40 MG: 10 INJECTION, EMULSION INTRAVENOUS at 07:45

## 2025-02-11 RX ADMIN — PROPOFOL 60 MG: 10 INJECTION, EMULSION INTRAVENOUS at 07:40

## 2025-02-11 RX ADMIN — FENTANYL CITRATE 25 MCG: 50 INJECTION, SOLUTION INTRAMUSCULAR; INTRAVENOUS at 07:37

## 2025-02-11 RX ADMIN — PROPOFOL 40 MG: 10 INJECTION, EMULSION INTRAVENOUS at 07:51

## 2025-02-11 RX ADMIN — Medication 500000 UNITS: at 21:09

## 2025-02-11 RX ADMIN — ACETAMINOPHEN 650 MG: 325 TABLET ORAL at 15:51

## 2025-02-11 RX ADMIN — LIDOCAINE HYDROCHLORIDE 50 MG: 10 INJECTION, SOLUTION EPIDURAL; INFILTRATION; INTRACAUDAL; PERINEURAL at 07:38

## 2025-02-11 RX ADMIN — SODIUM CHLORIDE: 9 INJECTION, SOLUTION INTRAVENOUS at 09:44

## 2025-02-11 RX ADMIN — EPHEDRINE SULFATE 10 MG: 5 INJECTION INTRAVENOUS at 07:55

## 2025-02-11 RX ADMIN — SODIUM CHLORIDE: 9 INJECTION, SOLUTION INTRAVENOUS at 06:59

## 2025-02-11 ASSESSMENT — PAIN DESCRIPTION - DESCRIPTORS
DESCRIPTORS: ACHING
DESCRIPTORS: OTHER (COMMENT)

## 2025-02-11 ASSESSMENT — PAIN DESCRIPTION - LOCATION
LOCATION: ABDOMEN
LOCATION: ABDOMEN
LOCATION: HEAD

## 2025-02-11 ASSESSMENT — PAIN - FUNCTIONAL ASSESSMENT
PAIN_FUNCTIONAL_ASSESSMENT: ACTIVITIES ARE NOT PREVENTED
PAIN_FUNCTIONAL_ASSESSMENT: ADULT NONVERBAL PAIN SCALE (NPVS)

## 2025-02-11 ASSESSMENT — PAIN SCALES - GENERAL
PAINLEVEL_OUTOF10: 6
PAINLEVEL_OUTOF10: 5
PAINLEVEL_OUTOF10: 5

## 2025-02-11 ASSESSMENT — PAIN DESCRIPTION - ORIENTATION: ORIENTATION: MID

## 2025-02-11 ASSESSMENT — PAIN SCALES - WONG BAKER: WONGBAKER_NUMERICALRESPONSE: NO HURT

## 2025-02-11 NOTE — CARE COORDINATION
Dx: Intracranial hemorrhage (HCC) [I62.9]  Hemorrhagic stroke (HCC) [I61.9]  Hypertensive emergency [I16.1]    Admit date: 1/28/2025  GMLOS :  LOS :    [facility] RISK OF UNPLANNED READMISSION 2.0             13.8 Total Score        ______________________________________      Patients goal/ Transitional Planning : Spoke with Ginny at [facility] and she informed they are still following for possible acceptance pending functional improvement.     Unable to reach [facility]. Called and notified [facility] and informed haven't heard back from [facility]  (affiliated facility). They stated she will reach out to administration and inform the hospital is having a difficult time regarding referrals.     Spoke to Chris at [facility] and he informed that he did not receive the referral sent on 2/7. He requested the referral be resent, which it was. Also, faxed clinical documentation for review regarding possible acceptance to SNF.     1224: Received a callback from Ania at [facility] and was informed she did not receive a referral for the patient. She requested the referral be resent, which it was, along with clinical documentation.     1530 Chris from [facility] called regarding additional questions with tube feeds and rate. He was informed patient was on Jevity 1.5 thorugh the NG and he hasn't started on PEG tube feeds at this point since PEG was placed today. RN was contacting GI regarding use. He requested an updated MAR list be provided.     1550: Spoke to patient and wife regarding plan for SNF. Pt.'s wife stated she still requests [facility] as first choice, and [facility] as alternative SNF. Both facilities are reviewing clinical documents for acceptance.        PT/OT recommendations : SNF        Barriers to discharge : Accepting facility pending clinical progression. SNF vs. ARU.

## 2025-02-11 NOTE — ANESTHESIA PRE PROCEDURE
Department of Anesthesiology  Preprocedure Note       Name:  Kalen Machado II   Age:  64 y.o.  :  1960                                          MRN:  [id number]         Date:  2025      Surgeon: Surgeon(s):  Pauline Brandt MD    Procedure: Procedure(s):  **ADD ON**ESOPHAGOGASTRODUODENOSCOPY PERCUTANEOUS ENDOSCOPIC GASTROSTOMY TUBE INSERTION    Medications prior to admission:   Prior to Admission medications    Not on File       Current medications:    Current Facility-Administered Medications   Medication Dose Route Frequency Provider Last Rate Last Admin    [Transfer Hold] 0.9 % sodium chloride infusion   IntraVENous Continuous Maryana Miller MD 75 mL/hr at 02/10/25 1115 Restarted at 02/10/25 111    [Transfer Hold] albuterol sulfate HFA (PROVENTIL;VENTOLIN;PROAIR) 108 (90 Base) MCG/ACT inhaler 2 puff  2 puff Inhalation Q4H PRN Gavi Pastor MD        [Transfer Hold] albuterol (PROVENTIL) (2.5 MG/3ML) 0.083% nebulizer solution 2.5 mg  2.5 mg Nebulization 4x Daily RT Gavi Pastor MD   2.5 mg at 02/10/25 2001    [Transfer Hold] sennosides-docusate sodium (SENOKOT-S) 8.6-50 MG tablet 2 tablet  2 tablet Oral Daily Gavi Pastor MD   2 tablet at 02/10/25 0933    [Transfer Hold] bisacodyl (DULCOLAX) suppository 10 mg  10 mg Rectal Daily Gavi Pastor MD   10 mg at 02/10/25 0933    [Transfer Hold] milk and molasses enema 240 mL  240 mL Rectal Once Gavi Pastor MD        [Transfer Hold] magic (miracle) mouthwash  5 mL Swish & Spit 4x Daily PRN Gavi Pastor MD   5 mL at 25 1549    [Transfer Hold] nystatin (MYCOSTATIN) 515514 UNIT/ML suspension 500,000 Units  5 mL Oral 4x Daily Rosalino Matthew MD   500,000 Units at 02/10/25 0933    [Transfer Hold] hydroCHLOROthiazide (HYDRODIURIL) tablet 12.5 mg  12.5 mg Oral Daily Haghbin, David, MD   12.5 mg at 02/10/25 0938    [Transfer Hold] polyethylene glycol (GLYCOLAX) packet 17 g  17 g Oral Daily David Rubio MD   17 g at

## 2025-02-11 NOTE — OP NOTE
PROCEDURE NOTE    DATE OF PROCEDURE: 2/11/2025     SURGEON: Rachael Whittaker MD  Facility: [facility]  ASSISTANT: None  Anesthesia: MAC   PREOPERATIVE DIAGNOSIS:   Need for PEG placement oropharyngeal dysphagia    Diagnosis:  Thick mucus in the oropharyngeal area with very poor hygiene    Gastritis with nodular mucosa and edema    PEG was placed successfully with no difficulty at all        POSTOPERATIVE DIAGNOSIS: As described below    OPERATION: Upper GI endoscopy with Biopsy    ANESTHESIA: Moderate Sedation     ESTIMATED BLOOD LOSS: Less than 50 ml    COMPLICATIONS: None.     SPECIMENS:  Was Not Obtained    HISTORY: The patient is a 64 y.o. year old male with history of above preop diagnosis.  I recommended esophagogastroduodenoscopy with possible biopsy and I explained the risk, benefits, expected outcome, and alternatives to the procedure.  Risks included but are not limited to bleeding, infection, respiratory distress, hypotension, and perforation of the esophagus, stomach, or duodenum.  Patient understands and is in agreement.      The patient was counseled at length about the risks of melly Covid-19 during their perioperative period and any recovery window from their procedure.  The patient was made aware that melly Covid-19  may worsen their prognosis for recovering from their procedure  and lend to a higher morbidity and/or mortality risk.  All material risks, benefits, and reasonable alternatives including postponing the procedure were discussed. The patient does wish to proceed with the procedure at this time.         PROCEDURE: The patient was given IV conscious sedation.  The patient's SPO2 remained above 90% throughout the procedure.The gastroscope was inserted orally and advanced under direct vision through the esophagus, through the stomach, through the pylorus, and into the descending duodenum.      Post sedation note :The patient's SPO2 remained above 90% throughout the procedure.the vital

## 2025-02-12 PROBLEM — E87.0 HYPERNATREMIA: Status: ACTIVE | Noted: 2025-02-12

## 2025-02-12 LAB — SODIUM SERPL-SCNC: 153 MMOL/L (ref 136–145)

## 2025-02-12 PROCEDURE — 97112 NEUROMUSCULAR REEDUCATION: CPT

## 2025-02-12 PROCEDURE — 97110 THERAPEUTIC EXERCISES: CPT

## 2025-02-12 PROCEDURE — 6360000002 HC RX W HCPCS: Performed by: INTERNAL MEDICINE

## 2025-02-12 PROCEDURE — 6370000000 HC RX 637 (ALT 250 FOR IP): Performed by: INTERNAL MEDICINE

## 2025-02-12 PROCEDURE — 99232 SBSQ HOSP IP/OBS MODERATE 35: CPT | Performed by: PSYCHIATRY & NEUROLOGY

## 2025-02-12 PROCEDURE — 99222 1ST HOSP IP/OBS MODERATE 55: CPT | Performed by: STUDENT IN AN ORGANIZED HEALTH CARE EDUCATION/TRAINING PROGRAM

## 2025-02-12 PROCEDURE — 36415 COLL VENOUS BLD VENIPUNCTURE: CPT

## 2025-02-12 PROCEDURE — 94640 AIRWAY INHALATION TREATMENT: CPT

## 2025-02-12 PROCEDURE — 1200000000 HC SEMI PRIVATE

## 2025-02-12 PROCEDURE — 2580000003 HC RX 258: Performed by: INTERNAL MEDICINE

## 2025-02-12 PROCEDURE — 2500000003 HC RX 250 WO HCPCS: Performed by: INTERNAL MEDICINE

## 2025-02-12 PROCEDURE — 99232 SBSQ HOSP IP/OBS MODERATE 35: CPT | Performed by: INTERNAL MEDICINE

## 2025-02-12 PROCEDURE — 99231 SBSQ HOSP IP/OBS SF/LOW 25: CPT | Performed by: INTERNAL MEDICINE

## 2025-02-12 PROCEDURE — 97530 THERAPEUTIC ACTIVITIES: CPT

## 2025-02-12 PROCEDURE — 94761 N-INVAS EAR/PLS OXIMETRY MLT: CPT

## 2025-02-12 PROCEDURE — G0545 PR INHERENT VISIT TO INPT: HCPCS | Performed by: INTERNAL MEDICINE

## 2025-02-12 PROCEDURE — 92507 TX SP LANG VOICE COMM INDIV: CPT

## 2025-02-12 PROCEDURE — 84295 ASSAY OF SERUM SODIUM: CPT

## 2025-02-12 RX ADMIN — LISINOPRIL 20 MG: 20 TABLET ORAL at 09:47

## 2025-02-12 RX ADMIN — SENNOSIDES AND DOCUSATE SODIUM 2 TABLET: 50; 8.6 TABLET ORAL at 09:47

## 2025-02-12 RX ADMIN — AMLODIPINE BESYLATE 10 MG: 10 TABLET ORAL at 09:47

## 2025-02-12 RX ADMIN — POLYETHYLENE GLYCOL 3350 17 G: 17 POWDER, FOR SOLUTION ORAL at 09:46

## 2025-02-12 RX ADMIN — ALBUTEROL SULFATE 2.5 MG: 2.5 SOLUTION RESPIRATORY (INHALATION) at 20:10

## 2025-02-12 RX ADMIN — ACETAMINOPHEN 650 MG: 325 TABLET ORAL at 20:55

## 2025-02-12 RX ADMIN — Medication 500000 UNITS: at 20:55

## 2025-02-12 RX ADMIN — ALBUTEROL SULFATE 2.5 MG: 2.5 SOLUTION RESPIRATORY (INHALATION) at 16:02

## 2025-02-12 RX ADMIN — Medication 500000 UNITS: at 09:48

## 2025-02-12 RX ADMIN — SODIUM CHLORIDE, PRESERVATIVE FREE 10 ML: 5 INJECTION INTRAVENOUS at 20:56

## 2025-02-12 RX ADMIN — Medication 500000 UNITS: at 16:30

## 2025-02-12 RX ADMIN — HYDROCHLOROTHIAZIDE 12.5 MG: 25 TABLET ORAL at 09:48

## 2025-02-12 RX ADMIN — IPRATROPIUM BROMIDE AND ALBUTEROL SULFATE 1 DOSE: .5; 2.5 SOLUTION RESPIRATORY (INHALATION) at 11:59

## 2025-02-12 RX ADMIN — SODIUM CHLORIDE, PRESERVATIVE FREE 40 MG: 5 INJECTION INTRAVENOUS at 09:46

## 2025-02-12 ASSESSMENT — PAIN SCALES - WONG BAKER: WONGBAKER_NUMERICALRESPONSE: NO HURT

## 2025-02-12 NOTE — ANESTHESIA POSTPROCEDURE EVALUATION
Department of Anesthesiology  Postprocedure Note    Patient: Kalen Machado II  MRN: [id number]  YOB: 1960  Date of evaluation: 2/12/2025    Procedure Summary       Date: 02/11/25 Room / Location: [address]    Anesthesia Start: 0734 Anesthesia Stop: 0807    Procedure: **ADD ON**ESOPHAGOGASTRODUODENOSCOPY PERCUTANEOUS ENDOSCOPIC GASTROSTOMY TUBE INSERTION Diagnosis:       Dysphagia, unspecified type      (Dysphagia, unspecified type [R13.10])    Surgeons: Pauline Brandt MD Responsible Provider: Kelli Camacho MD    Anesthesia Type: MAC ASA Status: 4            Anesthesia Type: No value filed.    Brian Phase I: Brian Score: 9    Brian Phase II:      Anesthesia Post Evaluation    Patient location during evaluation: bedside  Patient participation: complete - patient participated  Level of consciousness: awake and awake and alert  Pain score: 1  Airway patency: patent  Nausea & Vomiting: no nausea and no vomiting  Cardiovascular status: blood pressure returned to baseline and hemodynamically stable  Respiratory status: acceptable  Hydration status: euvolemic  Pain management: adequate        No notable events documented.

## 2025-02-13 LAB
ANION GAP SERPL CALCULATED.3IONS-SCNC: 13 MMOL/L (ref 9–16)
ANION GAP SERPL CALCULATED.3IONS-SCNC: 13 MMOL/L (ref 9–16)
BUN SERPL-MCNC: 36 MG/DL (ref 8–23)
CALCIUM SERPL-MCNC: 9.9 MG/DL (ref 8.6–10.4)
CHLORIDE SERPL-SCNC: 113 MMOL/L (ref 98–107)
CHLORIDE SERPL-SCNC: 117 MMOL/L (ref 98–107)
CO2 SERPL-SCNC: 19 MMOL/L (ref 20–31)
CO2 SERPL-SCNC: 22 MMOL/L (ref 20–31)
CREAT SERPL-MCNC: 0.8 MG/DL (ref 0.7–1.2)
GFR, ESTIMATED: >90 ML/MIN/1.73M2
GLUCOSE SERPL-MCNC: 125 MG/DL (ref 74–99)
POTASSIUM SERPL-SCNC: 3.6 MMOL/L (ref 3.7–5.3)
POTASSIUM SERPL-SCNC: 4 MMOL/L (ref 3.7–5.3)
SODIUM SERPL-SCNC: 148 MMOL/L (ref 136–145)
SODIUM SERPL-SCNC: 149 MMOL/L (ref 136–145)

## 2025-02-13 PROCEDURE — 2500000003 HC RX 250 WO HCPCS: Performed by: INTERNAL MEDICINE

## 2025-02-13 PROCEDURE — 51798 US URINE CAPACITY MEASURE: CPT

## 2025-02-13 PROCEDURE — 6360000002 HC RX W HCPCS: Performed by: INTERNAL MEDICINE

## 2025-02-13 PROCEDURE — 80048 BASIC METABOLIC PNL TOTAL CA: CPT

## 2025-02-13 PROCEDURE — 97112 NEUROMUSCULAR REEDUCATION: CPT

## 2025-02-13 PROCEDURE — 99232 SBSQ HOSP IP/OBS MODERATE 35: CPT | Performed by: PSYCHIATRY & NEUROLOGY

## 2025-02-13 PROCEDURE — 36415 COLL VENOUS BLD VENIPUNCTURE: CPT

## 2025-02-13 PROCEDURE — 80051 ELECTROLYTE PANEL: CPT

## 2025-02-13 PROCEDURE — 94640 AIRWAY INHALATION TREATMENT: CPT

## 2025-02-13 PROCEDURE — 1200000000 HC SEMI PRIVATE

## 2025-02-13 PROCEDURE — 97530 THERAPEUTIC ACTIVITIES: CPT

## 2025-02-13 PROCEDURE — 6370000000 HC RX 637 (ALT 250 FOR IP): Performed by: STUDENT IN AN ORGANIZED HEALTH CARE EDUCATION/TRAINING PROGRAM

## 2025-02-13 PROCEDURE — 94761 N-INVAS EAR/PLS OXIMETRY MLT: CPT

## 2025-02-13 PROCEDURE — 99232 SBSQ HOSP IP/OBS MODERATE 35: CPT | Performed by: INTERNAL MEDICINE

## 2025-02-13 PROCEDURE — 97110 THERAPEUTIC EXERCISES: CPT

## 2025-02-13 PROCEDURE — 2580000003 HC RX 258: Performed by: INTERNAL MEDICINE

## 2025-02-13 PROCEDURE — 6370000000 HC RX 637 (ALT 250 FOR IP): Performed by: INTERNAL MEDICINE

## 2025-02-13 PROCEDURE — G0545 PR INHERENT VISIT TO INPT: HCPCS | Performed by: INTERNAL MEDICINE

## 2025-02-13 RX ORDER — ALBUTEROL SULFATE 0.83 MG/ML
2.5 SOLUTION RESPIRATORY (INHALATION)
Status: DISCONTINUED | OUTPATIENT
Start: 2025-02-13 | End: 2025-02-14

## 2025-02-13 RX ADMIN — SODIUM CHLORIDE, PRESERVATIVE FREE 10 ML: 5 INJECTION INTRAVENOUS at 10:41

## 2025-02-13 RX ADMIN — SODIUM CHLORIDE, PRESERVATIVE FREE 10 ML: 5 INJECTION INTRAVENOUS at 20:42

## 2025-02-13 RX ADMIN — Medication 500000 UNITS: at 18:28

## 2025-02-13 RX ADMIN — AMLODIPINE BESYLATE 10 MG: 10 TABLET ORAL at 10:40

## 2025-02-13 RX ADMIN — Medication 500000 UNITS: at 10:40

## 2025-02-13 RX ADMIN — ALBUTEROL SULFATE 2.5 MG: 2.5 SOLUTION RESPIRATORY (INHALATION) at 14:45

## 2025-02-13 RX ADMIN — ALBUTEROL SULFATE 2.5 MG: 2.5 SOLUTION RESPIRATORY (INHALATION) at 08:24

## 2025-02-13 RX ADMIN — SODIUM CHLORIDE, PRESERVATIVE FREE 40 MG: 5 INJECTION INTRAVENOUS at 10:40

## 2025-02-13 RX ADMIN — HYDROCHLOROTHIAZIDE 12.5 MG: 25 TABLET ORAL at 10:39

## 2025-02-13 RX ADMIN — LISINOPRIL 20 MG: 20 TABLET ORAL at 10:40

## 2025-02-13 ASSESSMENT — PAIN SCALES - WONG BAKER: WONGBAKER_NUMERICALRESPONSE: NO HURT

## 2025-02-13 NOTE — CARE COORDINATION
Voice message left for Timoteo with [facility].    0905 Manually faxed updated note to [facility] and [facility].    1250 Received a call from Ania with [facility]. States they may be able to accept the patient, but wants to confirm they are the facility of choice. Writer to follow up with [facility].    1414 Spoke with Ginny from [facility]. Patient declined due to low level of functioning.    2110 Voice message left for [facility]. Informed they are the facility of choice. Requested a call to confirm acceptance so the prior auth can be initiated.

## 2025-02-13 NOTE — RT PROTOCOL NOTE
RT Inhaler-Nebulizer Bronchodilator Protocol Note    There is a bronchodilator order in the chart from a provider indicating to follow the RT Bronchodilator Protocol and there is an “Initiate RT Inhaler-Nebulizer Bronchodilator Protocol” order as well (see protocol at bottom of note).    CXR Findings:  No results found.    The findings from the last RT Protocol Assessment were as follows:   History Pulmonary Disease: Chronic pulmonary disease  Respiratory Pattern: Regular pattern and RR 12-20 bpm  Breath Sounds: Slightly diminished and/or crackles  Cough: Weak, non-productive  Indication for Bronchodilator Therapy: Decreased or absent breath sounds  Bronchodilator Assessment Score: 7    Aerosolized bronchodilator medication orders have been revised according to the RT Inhaler-Nebulizer Bronchodilator Protocol below.    Respiratory Therapist to perform RT Therapy Protocol Assessment initially then follow the protocol.      If indication present, adjust the RT bronchodilator orders based on the Bronchodilator Assessment Score as indicated below.  Use Inhaler orders unless patient has one or more of the following: on home nebulizer, not able to hold breath for 10 seconds, is not alert and oriented, cannot activate and use MDI correctly, or respiratory rate 25 breaths per minute or more, then use the equivalent nebulizer order(s) with same Frequency and PRN reasons based on the score.  If a patient is on this medication at home then do not decrease Frequency below that used at home.    7-10 - enter or revise RT Bronchodilator order(s) to two equivalent RT bronchodilator orders with one order with TID Frequency and one order with Frequency of every 4 hours PRN wheezing or increased work of breathing using Per Protocol order mode.         Electronically signed by Maite Murcia RCP on 2/13/2025 at 10:05 AM

## 2025-02-14 LAB
ANION GAP SERPL CALCULATED.3IONS-SCNC: 13 MMOL/L (ref 9–16)
ANION GAP SERPL CALCULATED.3IONS-SCNC: 13 MMOL/L (ref 9–16)
BASOPHILS # BLD: 0.06 K/UL (ref 0–0.2)
BASOPHILS NFR BLD: 1 % (ref 0–2)
BUN SERPL-MCNC: 33 MG/DL (ref 8–23)
CALCIUM SERPL-MCNC: 9.8 MG/DL (ref 8.6–10.4)
CHLORIDE SERPL-SCNC: 112 MMOL/L (ref 98–107)
CHLORIDE SERPL-SCNC: 114 MMOL/L (ref 98–107)
CO2 SERPL-SCNC: 20 MMOL/L (ref 20–31)
CO2 SERPL-SCNC: 24 MMOL/L (ref 20–31)
CREAT SERPL-MCNC: 0.7 MG/DL (ref 0.7–1.2)
EOSINOPHIL # BLD: 0.43 K/UL (ref 0–0.44)
EOSINOPHILS RELATIVE PERCENT: 4 % (ref 1–4)
ERYTHROCYTE [DISTWIDTH] IN BLOOD BY AUTOMATED COUNT: 12 % (ref 11.8–14.4)
GFR, ESTIMATED: >90 ML/MIN/1.73M2
GLUCOSE SERPL-MCNC: 132 MG/DL (ref 74–99)
HCT VFR BLD AUTO: 51.9 % (ref 40.7–50.3)
HGB BLD-MCNC: 15.8 G/DL (ref 13–17)
IMM GRANULOCYTES # BLD AUTO: 0.03 K/UL (ref 0–0.3)
IMM GRANULOCYTES NFR BLD: 0 %
LYMPHOCYTES NFR BLD: 2.34 K/UL (ref 1.1–3.7)
LYMPHOCYTES RELATIVE PERCENT: 19 % (ref 24–43)
MCH RBC QN AUTO: 31.4 PG (ref 25.2–33.5)
MCHC RBC AUTO-ENTMCNC: 30.4 G/DL (ref 28.4–34.8)
MCV RBC AUTO: 103.2 FL (ref 82.6–102.9)
MONOCYTES NFR BLD: 0.81 K/UL (ref 0.1–1.2)
MONOCYTES NFR BLD: 7 % (ref 3–12)
NEUTROPHILS NFR BLD: 69 % (ref 36–65)
NEUTS SEG NFR BLD: 8.66 K/UL (ref 1.5–8.1)
NRBC BLD-RTO: 0 PER 100 WBC
PLATELET # BLD AUTO: 247 K/UL (ref 138–453)
PMV BLD AUTO: 12.2 FL (ref 8.1–13.5)
POTASSIUM SERPL-SCNC: 3.8 MMOL/L (ref 3.7–5.3)
POTASSIUM SERPL-SCNC: 4 MMOL/L (ref 3.7–5.3)
RBC # BLD AUTO: 5.03 M/UL (ref 4.21–5.77)
RBC # BLD: ABNORMAL 10*6/UL
SODIUM SERPL-SCNC: 147 MMOL/L (ref 136–145)
SODIUM SERPL-SCNC: 149 MMOL/L (ref 136–145)
WBC OTHER # BLD: 12.3 K/UL (ref 3.5–11.3)

## 2025-02-14 PROCEDURE — 99232 SBSQ HOSP IP/OBS MODERATE 35: CPT | Performed by: PSYCHIATRY & NEUROLOGY

## 2025-02-14 PROCEDURE — 6360000002 HC RX W HCPCS: Performed by: INTERNAL MEDICINE

## 2025-02-14 PROCEDURE — 2580000003 HC RX 258: Performed by: INTERNAL MEDICINE

## 2025-02-14 PROCEDURE — 51798 US URINE CAPACITY MEASURE: CPT

## 2025-02-14 PROCEDURE — 94640 AIRWAY INHALATION TREATMENT: CPT

## 2025-02-14 PROCEDURE — 6360000002 HC RX W HCPCS

## 2025-02-14 PROCEDURE — 6370000000 HC RX 637 (ALT 250 FOR IP): Performed by: INTERNAL MEDICINE

## 2025-02-14 PROCEDURE — 85025 COMPLETE CBC W/AUTO DIFF WBC: CPT

## 2025-02-14 PROCEDURE — 6370000000 HC RX 637 (ALT 250 FOR IP): Performed by: STUDENT IN AN ORGANIZED HEALTH CARE EDUCATION/TRAINING PROGRAM

## 2025-02-14 PROCEDURE — 97110 THERAPEUTIC EXERCISES: CPT

## 2025-02-14 PROCEDURE — 80048 BASIC METABOLIC PNL TOTAL CA: CPT

## 2025-02-14 PROCEDURE — 80051 ELECTROLYTE PANEL: CPT

## 2025-02-14 PROCEDURE — 92507 TX SP LANG VOICE COMM INDIV: CPT

## 2025-02-14 PROCEDURE — 99233 SBSQ HOSP IP/OBS HIGH 50: CPT | Performed by: PSYCHIATRY & NEUROLOGY

## 2025-02-14 PROCEDURE — 1200000000 HC SEMI PRIVATE

## 2025-02-14 PROCEDURE — 2500000003 HC RX 250 WO HCPCS: Performed by: INTERNAL MEDICINE

## 2025-02-14 PROCEDURE — G0545 PR INHERENT VISIT TO INPT: HCPCS | Performed by: INTERNAL MEDICINE

## 2025-02-14 PROCEDURE — 94761 N-INVAS EAR/PLS OXIMETRY MLT: CPT

## 2025-02-14 PROCEDURE — 36415 COLL VENOUS BLD VENIPUNCTURE: CPT

## 2025-02-14 PROCEDURE — 99232 SBSQ HOSP IP/OBS MODERATE 35: CPT | Performed by: STUDENT IN AN ORGANIZED HEALTH CARE EDUCATION/TRAINING PROGRAM

## 2025-02-14 PROCEDURE — 99232 SBSQ HOSP IP/OBS MODERATE 35: CPT | Performed by: INTERNAL MEDICINE

## 2025-02-14 PROCEDURE — 97530 THERAPEUTIC ACTIVITIES: CPT

## 2025-02-14 RX ORDER — ALBUTEROL SULFATE 0.83 MG/ML
2.5 SOLUTION RESPIRATORY (INHALATION)
Status: DISCONTINUED | OUTPATIENT
Start: 2025-02-15 | End: 2025-02-18 | Stop reason: HOSPADM

## 2025-02-14 RX ADMIN — POLYETHYLENE GLYCOL 3350 17 G: 17 POWDER, FOR SOLUTION ORAL at 09:46

## 2025-02-14 RX ADMIN — SODIUM CHLORIDE, PRESERVATIVE FREE 10 ML: 5 INJECTION INTRAVENOUS at 09:47

## 2025-02-14 RX ADMIN — AMLODIPINE BESYLATE 10 MG: 10 TABLET ORAL at 09:46

## 2025-02-14 RX ADMIN — ENOXAPARIN SODIUM 40 MG: 100 INJECTION SUBCUTANEOUS at 09:46

## 2025-02-14 RX ADMIN — SODIUM CHLORIDE, PRESERVATIVE FREE 40 MG: 5 INJECTION INTRAVENOUS at 09:45

## 2025-02-14 RX ADMIN — SODIUM CHLORIDE, PRESERVATIVE FREE 10 ML: 5 INJECTION INTRAVENOUS at 20:22

## 2025-02-14 RX ADMIN — HYDROCHLOROTHIAZIDE 12.5 MG: 25 TABLET ORAL at 09:48

## 2025-02-14 RX ADMIN — Medication 500000 UNITS: at 09:46

## 2025-02-14 RX ADMIN — ALBUTEROL SULFATE 2.5 MG: 2.5 SOLUTION RESPIRATORY (INHALATION) at 09:08

## 2025-02-14 RX ADMIN — LISINOPRIL 20 MG: 20 TABLET ORAL at 09:46

## 2025-02-14 ASSESSMENT — PAIN SCALES - WONG BAKER
WONGBAKER_NUMERICALRESPONSE: NO HURT
WONGBAKER_NUMERICALRESPONSE: NO HURT

## 2025-02-14 ASSESSMENT — PAIN SCALES - GENERAL: PAINLEVEL_OUTOF10: 0

## 2025-02-14 NOTE — DISCHARGE INSTR - COC
Continuity of Care Form    Patient Name: Leland Martinez II   :  1960  MRN:  [id number]    Admit date:  2025  Discharge date:  ***    Code Status Order: Full Code   Advance Directives:   Advance Care Flowsheet Documentation        Date/Time Healthcare Directive Type of Healthcare Directive Copy in Chart Healthcare Agent Appointed Healthcare Agent's Name Healthcare Agent's Phone Number    2525 Unknown, patient unable to respond due to medical condition  --  --  --  --  --     25 0719 Unknown, patient unable to respond due to medical condition  --  --  --  --  --                     Admitting Physician:  Kelsie Leos MD  PCP: No primary care provider on file.    Discharging Nurse:   Discharging Hospital Unit/Room#: 0102/0102-01  Discharging Unit Phone Number: ***    Emergency Contact:   Extended Emergency Contact Information  Primary Emergency Contact: PAXTON MARTINEZ  [employer] Phone: [phone]  Relation: Spouse  Preferred language: English   needed? No  Secondary Emergency Contact: Maricruz Robles  [employer] Phone: [phone]  Relation: Brother/Sister    Past Surgical History:  Past Surgical History:   Procedure Laterality Date    CRANIOTOMY Left 2025    E0 CRANIOTOMY HEMATOMA EVACUATION performed by Ronal Harrell MD at [facility] OR    ESOPHAGOGASTRODUODENOSCOPY W/ PEG  2025    UPPER GASTROINTESTINAL ENDOSCOPY N/A 2025    **ADD ON**ESOPHAGOGASTRODUODENOSCOPY PERCUTANEOUS ENDOSCOPIC GASTROSTOMY TUBE INSERTION performed by Rachael Whittaker MD at [facility] OR       Immunization History:   Immunization History   Administered Date(s) Administered    COVID-19, PFIZER PURPLE top, DILUTE for use, (age 12 y+), 30mcg/0.3mL 2021, 2021       Active Problems:  Patient Active Problem List   Diagnosis Code    Intracranial hemorrhage (HCC) I62.9    Hemorrhagic stroke (HCC) I61.9    Hypertensive emergency I16.1    Cerebral edema (HCC) G93.6    Acute respiratory failure with hypoxia

## 2025-02-15 ENCOUNTER — APPOINTMENT (OUTPATIENT)
Dept: GENERAL RADIOLOGY | Age: 65
DRG: 023 | End: 2025-02-15
Payer: MEDICARE

## 2025-02-15 PROBLEM — I95.9 HYPOTENSION: Status: ACTIVE | Noted: 2025-02-15

## 2025-02-15 LAB
ANION GAP SERPL CALCULATED.3IONS-SCNC: 11 MMOL/L (ref 9–16)
BACTERIA URNS QL MICRO: ABNORMAL
BASOPHILS # BLD: 0.05 K/UL (ref 0–0.2)
BASOPHILS NFR BLD: 0 % (ref 0–2)
BILIRUB UR QL STRIP: ABNORMAL
BUN SERPL-MCNC: 33 MG/DL (ref 8–23)
CALCIUM SERPL-MCNC: 9 MG/DL (ref 8.6–10.4)
CASTS #/AREA URNS LPF: ABNORMAL /LPF (ref 0–2)
CHLORIDE SERPL-SCNC: 107 MMOL/L (ref 98–107)
CLARITY UR: ABNORMAL
CO2 SERPL-SCNC: 26 MMOL/L (ref 20–31)
COLOR UR: ABNORMAL
CREAT SERPL-MCNC: 0.8 MG/DL (ref 0.7–1.2)
CRP SERPL HS-MCNC: 27.5 MG/L (ref 0–5)
EOSINOPHIL # BLD: 0.39 K/UL (ref 0–0.44)
EOSINOPHILS RELATIVE PERCENT: 2 % (ref 1–4)
EPI CELLS #/AREA URNS HPF: ABNORMAL /HPF (ref 0–5)
ERYTHROCYTE [DISTWIDTH] IN BLOOD BY AUTOMATED COUNT: 11.7 % (ref 11.8–14.4)
ERYTHROCYTE [SEDIMENTATION RATE] IN BLOOD BY PHOTOMETRIC METHOD: 18 MM/HR (ref 0–20)
GFR, ESTIMATED: >90 ML/MIN/1.73M2
GLUCOSE SERPL-MCNC: 124 MG/DL (ref 74–99)
GLUCOSE UR STRIP-MCNC: NEGATIVE MG/DL
HCT VFR BLD AUTO: 48.9 % (ref 40.7–50.3)
HGB BLD-MCNC: 15.5 G/DL (ref 13–17)
HGB UR QL STRIP.AUTO: ABNORMAL
IMM GRANULOCYTES # BLD AUTO: 0.09 K/UL (ref 0–0.3)
IMM GRANULOCYTES NFR BLD: 1 %
KETONES UR STRIP-MCNC: ABNORMAL MG/DL
LACTIC ACID, WHOLE BLOOD: 2.3 MMOL/L (ref 0.7–2.1)
LACTIC ACID, WHOLE BLOOD: 2.6 MMOL/L (ref 0.7–2.1)
LEUKOCYTE ESTERASE UR QL STRIP: ABNORMAL
LYMPHOCYTES NFR BLD: 2.29 K/UL (ref 1.1–3.7)
LYMPHOCYTES RELATIVE PERCENT: 12 % (ref 24–43)
MCH RBC QN AUTO: 30.8 PG (ref 25.2–33.5)
MCHC RBC AUTO-ENTMCNC: 31.7 G/DL (ref 28.4–34.8)
MCV RBC AUTO: 97 FL (ref 82.6–102.9)
MONOCYTES NFR BLD: 1.22 K/UL (ref 0.1–1.2)
MONOCYTES NFR BLD: 6 % (ref 3–12)
NEUTROPHILS NFR BLD: 79 % (ref 36–65)
NEUTS SEG NFR BLD: 14.89 K/UL (ref 1.5–8.1)
NITRITE UR QL STRIP: POSITIVE
NRBC BLD-RTO: 0 PER 100 WBC
PH UR STRIP: 6 [PH] (ref 5–8)
PLATELET # BLD AUTO: 322 K/UL (ref 138–453)
PMV BLD AUTO: 11.6 FL (ref 8.1–13.5)
POTASSIUM SERPL-SCNC: 4.3 MMOL/L (ref 3.7–5.3)
PROT UR STRIP-MCNC: ABNORMAL MG/DL
RBC # BLD AUTO: 5.04 M/UL (ref 4.21–5.77)
RBC #/AREA URNS HPF: ABNORMAL /HPF (ref 0–2)
SODIUM SERPL-SCNC: 144 MMOL/L (ref 136–145)
SP GR UR STRIP: 1.03 (ref 1–1.03)
UROBILINOGEN UR STRIP-ACNC: NORMAL EU/DL (ref 0–1)
WBC #/AREA URNS HPF: ABNORMAL /HPF (ref 0–5)
WBC OTHER # BLD: 18.9 K/UL (ref 3.5–11.3)

## 2025-02-15 PROCEDURE — 83605 ASSAY OF LACTIC ACID: CPT

## 2025-02-15 PROCEDURE — 87086 URINE CULTURE/COLONY COUNT: CPT

## 2025-02-15 PROCEDURE — 85025 COMPLETE CBC W/AUTO DIFF WBC: CPT

## 2025-02-15 PROCEDURE — 80048 BASIC METABOLIC PNL TOTAL CA: CPT

## 2025-02-15 PROCEDURE — 6370000000 HC RX 637 (ALT 250 FOR IP)

## 2025-02-15 PROCEDURE — 6360000002 HC RX W HCPCS

## 2025-02-15 PROCEDURE — 2580000003 HC RX 258: Performed by: INTERNAL MEDICINE

## 2025-02-15 PROCEDURE — 99233 SBSQ HOSP IP/OBS HIGH 50: CPT | Performed by: PSYCHIATRY & NEUROLOGY

## 2025-02-15 PROCEDURE — 94761 N-INVAS EAR/PLS OXIMETRY MLT: CPT

## 2025-02-15 PROCEDURE — 2580000003 HC RX 258

## 2025-02-15 PROCEDURE — 81001 URINALYSIS AUTO W/SCOPE: CPT

## 2025-02-15 PROCEDURE — 2500000003 HC RX 250 WO HCPCS: Performed by: INTERNAL MEDICINE

## 2025-02-15 PROCEDURE — 6370000000 HC RX 637 (ALT 250 FOR IP): Performed by: INTERNAL MEDICINE

## 2025-02-15 PROCEDURE — 94640 AIRWAY INHALATION TREATMENT: CPT

## 2025-02-15 PROCEDURE — 92507 TX SP LANG VOICE COMM INDIV: CPT

## 2025-02-15 PROCEDURE — 6360000002 HC RX W HCPCS: Performed by: PSYCHIATRY & NEUROLOGY

## 2025-02-15 PROCEDURE — 6360000002 HC RX W HCPCS: Performed by: INTERNAL MEDICINE

## 2025-02-15 PROCEDURE — 2500000003 HC RX 250 WO HCPCS

## 2025-02-15 PROCEDURE — 87088 URINE BACTERIA CULTURE: CPT

## 2025-02-15 PROCEDURE — 36415 COLL VENOUS BLD VENIPUNCTURE: CPT

## 2025-02-15 PROCEDURE — 71045 X-RAY EXAM CHEST 1 VIEW: CPT

## 2025-02-15 PROCEDURE — 85652 RBC SED RATE AUTOMATED: CPT

## 2025-02-15 PROCEDURE — 87186 SC STD MICRODIL/AGAR DIL: CPT

## 2025-02-15 PROCEDURE — 86140 C-REACTIVE PROTEIN: CPT

## 2025-02-15 PROCEDURE — 1200000000 HC SEMI PRIVATE

## 2025-02-15 PROCEDURE — 87040 BLOOD CULTURE FOR BACTERIA: CPT

## 2025-02-15 RX ORDER — 0.9 % SODIUM CHLORIDE 0.9 %
500 INTRAVENOUS SOLUTION INTRAVENOUS ONCE
Status: COMPLETED | OUTPATIENT
Start: 2025-02-15 | End: 2025-02-15

## 2025-02-15 RX ORDER — BUTALBITAL, ACETAMINOPHEN AND CAFFEINE 50; 325; 40 MG/1; MG/1; MG/1
1 TABLET ORAL EVERY 6 HOURS PRN
Status: DISCONTINUED | OUTPATIENT
Start: 2025-02-15 | End: 2025-02-18 | Stop reason: HOSPADM

## 2025-02-15 RX ORDER — SODIUM CHLORIDE 9 MG/ML
INJECTION, SOLUTION INTRAVENOUS CONTINUOUS
Status: DISCONTINUED | OUTPATIENT
Start: 2025-02-15 | End: 2025-02-18 | Stop reason: HOSPADM

## 2025-02-15 RX ORDER — 0.9 % SODIUM CHLORIDE 0.9 %
1000 INTRAVENOUS SOLUTION INTRAVENOUS ONCE
Status: COMPLETED | OUTPATIENT
Start: 2025-02-15 | End: 2025-02-15

## 2025-02-15 RX ORDER — KETOROLAC TROMETHAMINE 30 MG/ML
15 INJECTION, SOLUTION INTRAMUSCULAR; INTRAVENOUS ONCE
Status: COMPLETED | OUTPATIENT
Start: 2025-02-15 | End: 2025-02-15

## 2025-02-15 RX ADMIN — SODIUM CHLORIDE, PRESERVATIVE FREE 40 MG: 5 INJECTION INTRAVENOUS at 09:07

## 2025-02-15 RX ADMIN — WATER 1000 MG: 1 INJECTION INTRAMUSCULAR; INTRAVENOUS; SUBCUTANEOUS at 16:41

## 2025-02-15 RX ADMIN — ALBUTEROL SULFATE 2.5 MG: 2.5 SOLUTION RESPIRATORY (INHALATION) at 08:19

## 2025-02-15 RX ADMIN — SODIUM CHLORIDE: 9 INJECTION, SOLUTION INTRAVENOUS at 12:09

## 2025-02-15 RX ADMIN — Medication 500000 UNITS: at 12:09

## 2025-02-15 RX ADMIN — KETOROLAC TROMETHAMINE 15 MG: 30 INJECTION, SOLUTION INTRAMUSCULAR; INTRAVENOUS at 05:36

## 2025-02-15 RX ADMIN — SODIUM CHLORIDE 500 ML: 9 INJECTION, SOLUTION INTRAVENOUS at 16:43

## 2025-02-15 RX ADMIN — SODIUM CHLORIDE 1000 ML: 9 INJECTION, SOLUTION INTRAVENOUS at 10:13

## 2025-02-15 RX ADMIN — SODIUM CHLORIDE, PRESERVATIVE FREE 10 ML: 5 INJECTION INTRAVENOUS at 09:07

## 2025-02-15 RX ADMIN — Medication 500000 UNITS: at 09:07

## 2025-02-15 RX ADMIN — ALBUTEROL SULFATE 2.5 MG: 2.5 SOLUTION RESPIRATORY (INHALATION) at 20:55

## 2025-02-15 RX ADMIN — ENOXAPARIN SODIUM 40 MG: 100 INJECTION SUBCUTANEOUS at 09:08

## 2025-02-15 RX ADMIN — BUTALBITAL, ACETAMINOPHEN, AND CAFFEINE 1 TABLET: 50; 325; 40 TABLET ORAL at 09:07

## 2025-02-15 NOTE — CONSULTS
Department of Neurosurgery                                            Nurse Practitioner Consult Note      Reason for Consult:  stroke alert large left intracerebral hemorrhage   Requesting Physician:  ED   Neurosurgeon:   [] Dr. Mckeon  [] Dr. Siblye  [] Dr. Hook  [x] Dr. Harrell      History Obtained From:  electronic medical record    CHIEF COMPLAINT:         No chief complaint on file.      HISTORY OF PRESENT ILLNESS:       The patient is a 89 y.o. male  Leland Eduardo (MRN [id number],  1960) who has possible history of COPD and cardiac stents, unsure if patient is on any anticoagulation or antiplatelet medications. He presents with last known well time about 215, he was found slumped over to his right by his wife. On arrival by EMS he was gurgling his secretions and he was intubated. He was found to be hypertensive . He had CT head showing large left basal ganglia,  thalamus and internal capsule extending into the adjacent left frontal lobe  measuring up to 5.6 x 5.1 x 4.6 cm with surrounding vasogenic edema and mass  effect with 1.1 cm left to right midline shift, also with Intraventricular extension of hemorrhage into the left greater than right lateral ventricles and to a lesser extent the 3rd and 4th ventricles with mild dilatation of the left greater than right lateral ventricle    He was started on cardene drip for BP less than 160  He received 250cc bolus of 3% saline  PAST MEDICAL HISTORY :       Past Medical History:    No past medical history on file.    Past Surgical History:    No past surgical history on file.    Social History:   Social History     Socioeconomic History    Marital status: Not on file     Spouse name: Not on file    Number of children: Not on file    Years of education: Not on file    Highest education level: Not on file   Occupational History    Not on file   Tobacco Use    Smoking status: Not on file    Smokeless tobacco: Not on file   Substance and 
          [facility] -   Infectious diseases evaluation  admission date 1/28/2025    reason for consultation:   Fever bandemia persistent    Impression :   Current:  large left basal ganglia and thalamus bleed w large mass effect   R SIDE PARALYSIS AND global aphasia  1/28/24 Post craniotomy and hematoma evacuation  and dura graft  Surg site clean  Bandemia  Low grade fever  Hypernatremia - iatrogenic  Resp failure - extubated 1/30  Dysphagia - failed swall study - NGT    Other:    Discussion / summary of stay / plan of care/ Recommendations:     HENCE:   Fever bandemia - BC and UA neg -CT AP 2/6 no bilat LL  pneumonia  - CXR neg 2/6  Iv only 4 days old  Post zosyn 2/1 - 2/4 and made no difference  Has NGT - planned for Peg 2/7  Suggest Get CT head / sinuses fr any sinusitis   Otherwise this might just be a central fever and bandemia    Infection Control Recommendations   [place] Precautions  Contact Isolation       Antimicrobial Stewardship Recommendations   Simplification of therapy  Targeted therapy  History of Present Illness:   Initial history:  Leland Eduardo II is a 64 y.o.-year-old male w CAD COPD and CHF  New onset R side weakness and confusion, EMS found him w higBP, intubated sedated.  CT large left basal ganglia and thalamus bleed w large mass effect  Extubated 1/30 and developed fever and bandemia, still not following commands, aphasic  Peg  2/7 planned  - for now still has NGT  Hypernatremia  due to hypertonic    Given zosyn but bandemia persistent   ID called for opinion and rule  out infection    On off LGF since admission  WBC  21 - 14 - 12 - 16 - 18  BC  neg 1/31 -2/2  UA neg  2/5  CT chest AP - no pneumonia  -rather benign 2/6/25    Interval changes  2/6/2025   Patient Vitals for the past 8 hrs:   BP Temp Temp src Pulse Resp   02/06/25 1600 138/88 99.6 °F (37.6 °C) -- 93 (!) 32   02/06/25 1258 (!) 128/91 99.2 °F (37.3 °C) Temporal 77 30       Summary of 
     Nutrition Note    RD Following. Consulted for TF Orders and Management.     When able to start PEG feeds, recommend previous regimen.     Standard with Fiber goal 65 mL/hr with water flushes 100 mL q4h d/t elevated Na lab (Na 150 mmol/L) - water flushes may be adjusted per physician recommendations.     TF to provide 2340 kcals and ~96 gm PRO/d.     Electronically signed by Maude Kwon MS, RDN, LDN on 2/9/25 at 8:54 AM EST    Weekend Contact: [phone]    
    Endovascular Neurosurgery Consult    Pt Name: Leland Eduardo II  MRN: [id number]  YOB: 1960  Date of evaluation: 1/28/2025  Primary Care Physician: No primary care provider on file.  Patient evaluated at the request of  Tashi Gallego MD    Reason for evaluation: abnormal vessels surrounding IPH of unclear etiology     SUBJECTIVE:   History of Chief Complaint:    The patient is a 64-year-old male with a medical history of hypertension, hyperlipidemia, coronary artery disease (status post stent), congestive heart failure, COPD, and thrombocytopenia, who presented to [facility] ED on 1/28/2025 via [facility] for new onset right-sided weakness and decreased alertness. His last known well time was around 1415 on 1/28, when his wife found him slumped over to his right. Upon EMS arrival, his blood pressure was hypertensive, with a systolic reading of 194. By the time [facility] intervened, the patient was unable to manage his secretions and was intubated for airway protection. He was sedated with propofol and also received fentanyl and Versed during transport. Upon arrival at the ED, the patient remained intubated and on propofol, which was temporarily held. His initial systolic blood pressure was greater than 200.  A CT head without contrast revealed a large acute left basal ganglia/thalamus intraparenchymal hemorrhage extending into the left frontal lobe, with associated vasogenic edema and a mass effect (1.1 cm midline shift), along with intraventricular extension into the lateral, third, and fourth ventricles, causing mild dilation of the left lateral ventricle more than the right. A CTA head/neck with contrast showed 90% stenosis at the origin of the left internal carotid artery (ICA), with vessels at the superior and inferior aspects of the known parenchymal hematoma, related to venous structures, and associated contrast blushing up to 3 mm.  The patient was started on a Cardene infusion to 
  Stroke Neurology Consult Note  Stroke Alert @3:19 PM on 1/28/2025  Arrival at bedside @3:20 PM    Reason for Consult:  ED Stroke Alert  Requesting Physician:  ED team   Endovascular Neurosurgeon: Misha Cantrell MD  Stroke Team: Bj Mi MD   History Obtained From: electronic medical record  Chief Complaint:  Altered mental status  and right hemiplegia   Allergies:  Patient has no allergy information on record.    History of Presenting Illness     [facility] Octavio is a 89 y.o. right handed male with a history of  who presents with acute onset altered mentation along with right hemiparesis.    Last known well was 2:15 PM as per the wife.  EMS was called sometime before 3 PM, and patient was noted to have GCS 6 on scene.  He underwent RSI with succinylcholine and etomidate.  He presented intubated and sedated, and was noted to be hypertensive with systolic 194 and bradycardic with HR 44-45.  CT head without IV contrast revealed large left ICH with midline shift and intraventricular hemorrhage and moderate amount of blood in the fourth ventricle.  Patient did not seem to be taking anticoagulation based on medication list.      LKW: 2:15 PM on 1/28/2025  NIHSS: 23  Modified Caballo Scale: 0  SBP: 194  Glucose: 117  CT head without contrast: Large left ICH with midline shift, IVH and blood in fourth ventricle    TNK: Not indicated due to ICH.  Endovascular: N/A      Past Histories    No past medical history on file.  No past surgical history on file.  Social History     Socioeconomic History    Marital status: Not on file     Spouse name: Not on file    Number of children: Not on file    Years of education: Not on file    Highest education level: Not on file   Occupational History    Not on file   Tobacco Use    Smoking status: Not on file    Smokeless tobacco: Not on file   Substance and Sexual Activity    Alcohol use: Not on file    Drug use: Not on file    Sexual activity: Not on file   Other Topics Concern    
Physical Medicine & Rehabilitation  Consult Note      Admitting Physician:   Kelsie Leos MD    Primary Care Provider:   No primary care provider on file.     Reason for Consult:  Acute Inpatient Rehabilitation    Chief Complaint: Cerebrovascular accident    History of Present Illness:  Referring Provider is requesting an evaluation for appropriate placement upon discharge from acute care. History from chart review     Leland Eduardo II is a 64 y.o. male admitted to [facility] on 1/28/2025.      64-year-old male with history of hypertension hyperlipidemia, coronary artery disease status post stent, CHF, COPD and thrombocytopenia admitted 1/28/2025 with new right-sided weakness and decreased alertness patient required intubation initial imaging showed large left basal ganglia/thalamic hemorrhage with significant mass effect.  Neurosurgery performed a craniotomy with hematoma evacuation postop continue intubation sedation.  Was extubated 1/30 but developed fever and leukocytosis started on antibiotics with improvement..  Continues with dense global aphasia not following commands has no movement right upper extremity.  Receiving tube feeds CTA head and neck showed 90% stenosis left internal carotid artery    Neurocritical 2/2-large left basal ganglia/thalamic intraparenchymal hemorrhage with intraventricular extension, mild obstructive hydrocephalus likely second hypertension status post craniotomy for hematoma evacuation neurosurgery following, essential hypertension blood pressure medication being adjusted, acute hypoxic respite failure resolved x-ray 1/30 currently receiving tube feeds on Lovenox    Neurology 2/3 dense global aphasia and right-sided weakness upper greater than lower extremity will likely need PEG consider swallow study next few days will likely PEG,    Review of Systems:  Unable to obtain, nonverbal minimal following commands     Premorbid function:  Independent    Current 
Renal Consult Note    Patient :  Leland Eduardo II; 64 y.o. MRN# [id number]  Location:  0102/0102-01  Attending:  Kelsie Leos MD  Admit Date:  1/28/2025   Hospital Day: 15    Reason for Consult:     Asked by Kelsie Samson MD to see for hypernatremia    History Obtained From:     patient, family member, electronic medical record    History of Present Illness:     Leland Eduardo II; 64 y.o. male with past medical history of hypertension, hyperlipidemia, CAD, CHF, COPD, and thrombocytopenia who presented to the hospital with the chief complaint of new onset right-sided weakness with altered level of consciousness.    According to documentation, patient initially presented to ED on 1/28/25 after being found slumped over to the right at home by his wife.  Last known well was same day around 1415.  On EMS arrival patient was found to be hypertensive with a systolic of 194, [facility] was activated and patient was intubated and route for airway protection.  CT head without contrast showed large acute left basal ganglia/thalami intraparenchymal hemorrhage extending into the left frontal lobe with associated vasogenic edema and mass effect with 1.1 cm midline shift.  He also had intraventricular extension into the lateral, third, and fourth ventricle ventricles with mild dilation left greater than right.  CTA head and neck with contrast showed 90% stenosis at the origin of the left ICA.  Patient was started on Cardene drip, administered 250 cc 3% bolus, admitted to neuro ICU, and shortly thereafter taken for emergent left craniotomy with hematoma evacuation. Patient was extubated on 1/30/2025 and subsequently transferred out of neuro ICU on 2/2/2025.  He was started on Zosyn for postoperative fever and leukocytosis, and eventually underwent PEG placement for safer administration of tube feeds and medications.  Nephrology has now been consulted for management of hypernatremia.    Most recent labs reviewed, 
See cosult  
fairly similar in size to prior exam but somewhat less dense. There is surrounding edema and mass effect which is unchanged.  Some shift of midline to the right measuring 9 mm unchanged. 2. There is blood extension into the ventricles layering in the posterior horns bilaterally unchanged. 3. Postoperative change left-sided craniotomy again noted.       Medical Decision Aukcjr-[id number]-[id number]:     Results       Procedure Component Value Units Date/Time    Culture, Respiratory [[id number]]     Order Status: No result Specimen: Sputum Expectorated     Respiratory Panel, Molecular, with COVID-19 (Restricted: peds pts or suitable admitted adults) [[id number]] Collected: 02/06/25 1544    Order Status: Completed Specimen: Nasopharyngeal Swab Updated: 02/06/25 1759     Specimen Description .NASOPHARYNGEAL SWAB     Adenovirus PCR Not Detected     Coronavirus 229E PCR Not Detected     Coronavirus HKU1 PCR Not Detected     Coronavirus NL63 PCR Not Detected     Coronavirus OC43 PCR Not Detected     SARS-CoV-2, PCR Not Detected     Human Metapneumovirus PCR Not Detected     Rhino/Enterovirus PCR Not Detected     Influenza A by PCR Not Detected     Influenza B by PCR Not Detected     Parainfluenza 1 PCR Not Detected     Parainfluenza 2 PCR Not Detected     Parainfluenza 3 PCR Not Detected     Parainfluenza 4 PCR Not Detected     Resp Syncytial Virus PCR Not Detected     Bordetella parapertussis by PCR Not Detected     B Pertussis by PCR Not Detected     Chlamydia pneumoniae By PCR Not Detected     Mycoplasma pneumo by PCR Not Detected     Comment: Performed by multiplexed nucleic acid assay.       Culture, Blood 1 [[id number]] Collected: 02/02/25 1143    Order Status: Completed Specimen: Blood Updated: 02/07/25 1258     Specimen Description .BLOOD     Special Requests L AC 3ML     Culture NO GROWTH 5 DAYS    Culture, Blood 2 [[id number]] Collected: 02/02/25 1138    Order Status: Completed Specimen: Blood Updated: 02/07/25 1259     
mA/kV was utilized to reduce the radiation dose to as low as reasonably achievable. COMPARISON: CT head January 28, 2025 at 15:36 HISTORY: ORDERING SYSTEM PROVIDED HISTORY: follow up ICH TECHNOLOGIST PROVIDED HISTORY: follow up ICH stealth protocol for pre op planning Reason for Exam: follow up ICH, stealth protocol for pre op planning; markers placed on head FINDINGS: BRAIN/VENTRICLES: There is 5.2 x 6.1 cm acute parenchymal hematoma centered in the left thalamus/basal ganglia, extending to the left frontal parietal lobes, with associated mass effect and surrounding vasogenic edema, grossly stable since the prior study.  There is associated 1 cm left to right midline shift, stable.  There is mild acute subarachnoid hemorrhage along the left sylvian fissure, stable. There is decompression into the left lateral ventricle, stable.  There is mild layering blood products in the occipital horn of the right lateral ventricle, increased.  There is compression of the left lateral ventricle, stable.  There is mild dilatation of temporal horns of the bilateral lateral ventricles, stable to mildly increased. The gray-white differentiation is maintained without evidence of an acute infarct. ORBITS: The visualized portion of the orbits demonstrate no acute abnormality. SINUSES: The visualized paranasal sinuses and mastoid air cells demonstrate no acute abnormality. SOFT TISSUES/SKULL:  No acute abnormality of the visualized skull or soft tissues.     5.2 x 6.1 cm acute parenchymal hematoma centered in the left thalamus/basal ganglia, extending to the left frontal parietal lobes, with associated mass effect and surrounding vasogenic edema, grossly stable since the prior study. Decompression of hematoma into the left lateral ventricle, stable. Associated 1 cm left to right midline shift, stable. Mild acute subarachnoid hemorrhage along the left sylvian fissure, stable. Mild layering blood products in the occipital horn of the

## 2025-02-16 LAB
ANION GAP SERPL CALCULATED.3IONS-SCNC: 9 MMOL/L (ref 9–16)
BASOPHILS # BLD: 0.05 K/UL (ref 0–0.2)
BASOPHILS NFR BLD: 0 % (ref 0–2)
BUN SERPL-MCNC: 20 MG/DL (ref 8–23)
CALCIUM SERPL-MCNC: 8.5 MG/DL (ref 8.6–10.4)
CHLORIDE SERPL-SCNC: 108 MMOL/L (ref 98–107)
CO2 SERPL-SCNC: 22 MMOL/L (ref 20–31)
CREAT SERPL-MCNC: 0.6 MG/DL (ref 0.7–1.2)
EOSINOPHIL # BLD: 0.15 K/UL (ref 0–0.44)
EOSINOPHILS RELATIVE PERCENT: 1 % (ref 1–4)
ERYTHROCYTE [DISTWIDTH] IN BLOOD BY AUTOMATED COUNT: 11.9 % (ref 11.8–14.4)
GFR, ESTIMATED: >90 ML/MIN/1.73M2
GLUCOSE SERPL-MCNC: 124 MG/DL (ref 74–99)
HCT VFR BLD AUTO: 39.2 % (ref 40.7–50.3)
HGB BLD-MCNC: 12 G/DL (ref 13–17)
IMM GRANULOCYTES # BLD AUTO: 0.21 K/UL (ref 0–0.3)
IMM GRANULOCYTES NFR BLD: 1 %
LACTIC ACID, WHOLE BLOOD: 1.3 MMOL/L (ref 0.7–2.1)
LYMPHOCYTES NFR BLD: 1.73 K/UL (ref 1.1–3.7)
LYMPHOCYTES RELATIVE PERCENT: 8 % (ref 24–43)
MCH RBC QN AUTO: 31.2 PG (ref 25.2–33.5)
MCHC RBC AUTO-ENTMCNC: 30.6 G/DL (ref 28.4–34.8)
MCV RBC AUTO: 101.8 FL (ref 82.6–102.9)
MONOCYTES NFR BLD: 1.32 K/UL (ref 0.1–1.2)
MONOCYTES NFR BLD: 6 % (ref 3–12)
NEUTROPHILS NFR BLD: 85 % (ref 36–65)
NEUTS SEG NFR BLD: 19.69 K/UL (ref 1.5–8.1)
NRBC BLD-RTO: 0 PER 100 WBC
PLATELET # BLD AUTO: 183 K/UL (ref 138–453)
PMV BLD AUTO: 11.7 FL (ref 8.1–13.5)
POTASSIUM SERPL-SCNC: 3.9 MMOL/L (ref 3.7–5.3)
RBC # BLD AUTO: 3.85 M/UL (ref 4.21–5.77)
SODIUM SERPL-SCNC: 139 MMOL/L (ref 136–145)
WBC OTHER # BLD: 23.2 K/UL (ref 3.5–11.3)

## 2025-02-16 PROCEDURE — 6370000000 HC RX 637 (ALT 250 FOR IP): Performed by: INTERNAL MEDICINE

## 2025-02-16 PROCEDURE — 97110 THERAPEUTIC EXERCISES: CPT

## 2025-02-16 PROCEDURE — 2580000003 HC RX 258

## 2025-02-16 PROCEDURE — 2580000003 HC RX 258: Performed by: INTERNAL MEDICINE

## 2025-02-16 PROCEDURE — 80048 BASIC METABOLIC PNL TOTAL CA: CPT

## 2025-02-16 PROCEDURE — 6360000002 HC RX W HCPCS

## 2025-02-16 PROCEDURE — 83605 ASSAY OF LACTIC ACID: CPT

## 2025-02-16 PROCEDURE — 99232 SBSQ HOSP IP/OBS MODERATE 35: CPT | Performed by: PSYCHIATRY & NEUROLOGY

## 2025-02-16 PROCEDURE — 94640 AIRWAY INHALATION TREATMENT: CPT

## 2025-02-16 PROCEDURE — 6360000002 HC RX W HCPCS: Performed by: PSYCHIATRY & NEUROLOGY

## 2025-02-16 PROCEDURE — 1200000000 HC SEMI PRIVATE

## 2025-02-16 PROCEDURE — 85025 COMPLETE CBC W/AUTO DIFF WBC: CPT

## 2025-02-16 PROCEDURE — 94761 N-INVAS EAR/PLS OXIMETRY MLT: CPT

## 2025-02-16 PROCEDURE — 36415 COLL VENOUS BLD VENIPUNCTURE: CPT

## 2025-02-16 PROCEDURE — 2500000003 HC RX 250 WO HCPCS

## 2025-02-16 PROCEDURE — 97530 THERAPEUTIC ACTIVITIES: CPT

## 2025-02-16 PROCEDURE — 2500000003 HC RX 250 WO HCPCS: Performed by: INTERNAL MEDICINE

## 2025-02-16 PROCEDURE — 99233 SBSQ HOSP IP/OBS HIGH 50: CPT | Performed by: PSYCHIATRY & NEUROLOGY

## 2025-02-16 PROCEDURE — 6360000002 HC RX W HCPCS: Performed by: INTERNAL MEDICINE

## 2025-02-16 RX ADMIN — SENNOSIDES AND DOCUSATE SODIUM 2 TABLET: 50; 8.6 TABLET ORAL at 09:49

## 2025-02-16 RX ADMIN — SODIUM CHLORIDE, PRESERVATIVE FREE 10 ML: 5 INJECTION INTRAVENOUS at 09:50

## 2025-02-16 RX ADMIN — Medication 500000 UNITS: at 13:00

## 2025-02-16 RX ADMIN — ACETAMINOPHEN 650 MG: 325 TABLET ORAL at 09:53

## 2025-02-16 RX ADMIN — SODIUM CHLORIDE, PRESERVATIVE FREE 40 MG: 5 INJECTION INTRAVENOUS at 09:49

## 2025-02-16 RX ADMIN — SODIUM CHLORIDE: 9 INJECTION, SOLUTION INTRAVENOUS at 14:56

## 2025-02-16 RX ADMIN — ENOXAPARIN SODIUM 40 MG: 100 INJECTION SUBCUTANEOUS at 09:49

## 2025-02-16 RX ADMIN — Medication 500000 UNITS: at 17:51

## 2025-02-16 RX ADMIN — WATER 1000 MG: 1 INJECTION INTRAMUSCULAR; INTRAVENOUS; SUBCUTANEOUS at 14:53

## 2025-02-16 RX ADMIN — Medication 500000 UNITS: at 09:50

## 2025-02-16 RX ADMIN — ALBUTEROL SULFATE 2.5 MG: 2.5 SOLUTION RESPIRATORY (INHALATION) at 21:52

## 2025-02-16 RX ADMIN — ALBUTEROL SULFATE 2.5 MG: 2.5 SOLUTION RESPIRATORY (INHALATION) at 08:35

## 2025-02-16 RX ADMIN — POLYETHYLENE GLYCOL 3350 17 G: 17 POWDER, FOR SOLUTION ORAL at 09:49

## 2025-02-16 ASSESSMENT — PAIN SCALES - GENERAL
PAINLEVEL_OUTOF10: 2
PAINLEVEL_OUTOF10: 0
PAINLEVEL_OUTOF10: 0
PAINLEVEL_OUTOF10: 2

## 2025-02-16 NOTE — CARE COORDINATION
Dx: Intracranial hemorrhage (HCC) [I62.9]  Hemorrhagic stroke (HCC) [I61.9]  Hypertensive emergency [I16.1]    Admit date: 1/28/2025  GMLOS :  LOS :    [facility] RISK OF UNPLANNED READMISSION 2.0             12 Total Score        ______________________________________      Patients goal/ Transitional Planning : Attempted to contact facility regarding the status of the precert. Unable to reach anyone at this time. Left a VM with Vashti at [facility] requesting a callback on the status of the Precert.       PT/OT recommendations : SNF vs. IPR        Barriers to discharge : Precert approval,

## 2025-02-16 NOTE — CARE COORDINATION
Transition planning    Submitted auth for [facility] via [employer]. Approved 2-16 thru 2-, Auth ID # [id number].

## 2025-02-17 LAB
ANION GAP SERPL CALCULATED.3IONS-SCNC: 8 MMOL/L (ref 9–16)
BUN SERPL-MCNC: 15 MG/DL (ref 8–23)
CALCIUM SERPL-MCNC: 8.4 MG/DL (ref 8.6–10.4)
CHLORIDE SERPL-SCNC: 105 MMOL/L (ref 98–107)
CO2 SERPL-SCNC: 23 MMOL/L (ref 20–31)
CREAT SERPL-MCNC: 0.6 MG/DL (ref 0.7–1.2)
GFR, ESTIMATED: >90 ML/MIN/1.73M2
GLUCOSE SERPL-MCNC: 125 MG/DL (ref 74–99)
POTASSIUM SERPL-SCNC: 3.9 MMOL/L (ref 3.7–5.3)
SODIUM SERPL-SCNC: 136 MMOL/L (ref 136–145)

## 2025-02-17 PROCEDURE — 6370000000 HC RX 637 (ALT 250 FOR IP): Performed by: STUDENT IN AN ORGANIZED HEALTH CARE EDUCATION/TRAINING PROGRAM

## 2025-02-17 PROCEDURE — 2580000003 HC RX 258: Performed by: INTERNAL MEDICINE

## 2025-02-17 PROCEDURE — 6370000000 HC RX 637 (ALT 250 FOR IP): Performed by: INTERNAL MEDICINE

## 2025-02-17 PROCEDURE — 80048 BASIC METABOLIC PNL TOTAL CA: CPT

## 2025-02-17 PROCEDURE — 36415 COLL VENOUS BLD VENIPUNCTURE: CPT

## 2025-02-17 PROCEDURE — 94640 AIRWAY INHALATION TREATMENT: CPT

## 2025-02-17 PROCEDURE — 2500000003 HC RX 250 WO HCPCS: Performed by: INTERNAL MEDICINE

## 2025-02-17 PROCEDURE — 94761 N-INVAS EAR/PLS OXIMETRY MLT: CPT

## 2025-02-17 PROCEDURE — 6360000002 HC RX W HCPCS: Performed by: PSYCHIATRY & NEUROLOGY

## 2025-02-17 PROCEDURE — 99232 SBSQ HOSP IP/OBS MODERATE 35: CPT | Performed by: PSYCHIATRY & NEUROLOGY

## 2025-02-17 PROCEDURE — 92507 TX SP LANG VOICE COMM INDIV: CPT

## 2025-02-17 PROCEDURE — 6360000002 HC RX W HCPCS: Performed by: INTERNAL MEDICINE

## 2025-02-17 PROCEDURE — 6360000002 HC RX W HCPCS

## 2025-02-17 PROCEDURE — 2500000003 HC RX 250 WO HCPCS

## 2025-02-17 PROCEDURE — 1200000000 HC SEMI PRIVATE

## 2025-02-17 PROCEDURE — 94664 DEMO&/EVAL PT USE INHALER: CPT

## 2025-02-17 RX ADMIN — SODIUM CHLORIDE, PRESERVATIVE FREE 10 ML: 5 INJECTION INTRAVENOUS at 20:00

## 2025-02-17 RX ADMIN — LISINOPRIL 20 MG: 20 TABLET ORAL at 09:43

## 2025-02-17 RX ADMIN — Medication 500000 UNITS: at 20:00

## 2025-02-17 RX ADMIN — POLYETHYLENE GLYCOL 3350 17 G: 17 POWDER, FOR SOLUTION ORAL at 09:43

## 2025-02-17 RX ADMIN — SODIUM CHLORIDE, PRESERVATIVE FREE 40 MG: 5 INJECTION INTRAVENOUS at 09:43

## 2025-02-17 RX ADMIN — Medication 500000 UNITS: at 15:40

## 2025-02-17 RX ADMIN — ENOXAPARIN SODIUM 40 MG: 100 INJECTION SUBCUTANEOUS at 09:42

## 2025-02-17 RX ADMIN — HYDROCHLOROTHIAZIDE 12.5 MG: 25 TABLET ORAL at 09:50

## 2025-02-17 RX ADMIN — WATER 1000 MG: 1 INJECTION INTRAMUSCULAR; INTRAVENOUS; SUBCUTANEOUS at 16:51

## 2025-02-17 RX ADMIN — SODIUM CHLORIDE, PRESERVATIVE FREE 10 ML: 5 INJECTION INTRAVENOUS at 09:44

## 2025-02-17 RX ADMIN — AMLODIPINE BESYLATE 10 MG: 10 TABLET ORAL at 09:43

## 2025-02-17 RX ADMIN — ALBUTEROL SULFATE 2.5 MG: 2.5 SOLUTION RESPIRATORY (INHALATION) at 08:20

## 2025-02-17 RX ADMIN — Medication 500000 UNITS: at 09:43

## 2025-02-17 RX ADMIN — ALBUTEROL SULFATE 2.5 MG: 2.5 SOLUTION RESPIRATORY (INHALATION) at 21:29

## 2025-02-17 RX ADMIN — BISACODYL 10 MG: 10 SUPPOSITORY RECTAL at 09:44

## 2025-02-17 RX ADMIN — SENNOSIDES AND DOCUSATE SODIUM 2 TABLET: 50; 8.6 TABLET ORAL at 09:43

## 2025-02-17 NOTE — CARE COORDINATION
Dx: Intracranial hemorrhage (HCC) [I62.9]  Hemorrhagic stroke (HCC) [I61.9]  Hypertensive emergency [I16.1]    Admit date: 1/28/2025  GMLOS :  LOS :    [facility] RISK OF UNPLANNED READMISSION 2.0             11.8 Total Score        ______________________________________      Patients goal/ Transitional Planning :    Plan is [facility] auth is approved 2/16-2/19, need patient's final antibiotic plan. ID signed off 2/14. Need clarification on discharge readiness.     [facility] via [employer]. Approved 2-16 thru 2-, Auth ID # [id number]     PT/OT recommendations :        Barriers to discharge :    0935: DAWOOD received voicemail from lachelle Kingston for [facility] requesting a return call.    Vashti: [phone]      1046: DAWOOD called and spoke with lachelle Kingston for [facility]. DAWOOD updated Vashti that auth has been received, but unsure of discharge plan at this time, awaiting final antibiotic plan. Vashti reports to update facility once information is available as facility is starting to run out of beds.

## 2025-02-18 VITALS
TEMPERATURE: 98.7 F | WEIGHT: 118.83 LBS | HEIGHT: 74 IN | BODY MASS INDEX: 15.25 KG/M2 | HEART RATE: 53 BPM | OXYGEN SATURATION: 96 % | SYSTOLIC BLOOD PRESSURE: 107 MMHG | DIASTOLIC BLOOD PRESSURE: 77 MMHG | RESPIRATION RATE: 20 BRPM

## 2025-02-18 PROBLEM — N30.90 CYSTITIS: Status: ACTIVE | Noted: 2025-02-18

## 2025-02-18 LAB
MICROORGANISM SPEC CULT: ABNORMAL
SERVICE CMNT-IMP: ABNORMAL
SPECIMEN DESCRIPTION: ABNORMAL

## 2025-02-18 PROCEDURE — 2500000003 HC RX 250 WO HCPCS

## 2025-02-18 PROCEDURE — 99232 SBSQ HOSP IP/OBS MODERATE 35: CPT | Performed by: PSYCHIATRY & NEUROLOGY

## 2025-02-18 PROCEDURE — 6370000000 HC RX 637 (ALT 250 FOR IP): Performed by: INTERNAL MEDICINE

## 2025-02-18 PROCEDURE — 97530 THERAPEUTIC ACTIVITIES: CPT

## 2025-02-18 PROCEDURE — 94640 AIRWAY INHALATION TREATMENT: CPT

## 2025-02-18 PROCEDURE — 2500000003 HC RX 250 WO HCPCS: Performed by: INTERNAL MEDICINE

## 2025-02-18 PROCEDURE — 97110 THERAPEUTIC EXERCISES: CPT

## 2025-02-18 PROCEDURE — 6360000002 HC RX W HCPCS

## 2025-02-18 PROCEDURE — 6370000000 HC RX 637 (ALT 250 FOR IP): Performed by: STUDENT IN AN ORGANIZED HEALTH CARE EDUCATION/TRAINING PROGRAM

## 2025-02-18 PROCEDURE — 92507 TX SP LANG VOICE COMM INDIV: CPT

## 2025-02-18 PROCEDURE — 6360000002 HC RX W HCPCS: Performed by: PSYCHIATRY & NEUROLOGY

## 2025-02-18 PROCEDURE — 2580000003 HC RX 258: Performed by: INTERNAL MEDICINE

## 2025-02-18 PROCEDURE — 97112 NEUROMUSCULAR REEDUCATION: CPT

## 2025-02-18 PROCEDURE — 2580000003 HC RX 258

## 2025-02-18 PROCEDURE — 6360000002 HC RX W HCPCS: Performed by: INTERNAL MEDICINE

## 2025-02-18 RX ORDER — LISINOPRIL 20 MG/1
20 TABLET ORAL DAILY
Qty: 30 TABLET | Refills: 3 | Status: SHIPPED | OUTPATIENT
Start: 2025-02-18

## 2025-02-18 RX ORDER — LEVOFLOXACIN 500 MG/1
500 TABLET, FILM COATED ORAL DAILY
Status: DISCONTINUED | OUTPATIENT
Start: 2025-02-19 | End: 2025-02-18 | Stop reason: HOSPADM

## 2025-02-18 RX ORDER — AMLODIPINE BESYLATE 10 MG/1
10 TABLET ORAL DAILY
Qty: 30 TABLET | Refills: 3 | Status: SHIPPED | OUTPATIENT
Start: 2025-02-18

## 2025-02-18 RX ORDER — BUTALBITAL, ACETAMINOPHEN AND CAFFEINE 50; 325; 40 MG/1; MG/1; MG/1
1 TABLET ORAL EVERY 6 HOURS PRN
Qty: 30 TABLET | Refills: 0 | Status: SHIPPED | OUTPATIENT
Start: 2025-02-18

## 2025-02-18 RX ORDER — CEPHALEXIN 500 MG/1
500 CAPSULE ORAL 4 TIMES DAILY
Qty: 16 CAPSULE | Refills: 0 | Status: SHIPPED | OUTPATIENT
Start: 2025-02-18 | End: 2025-02-22

## 2025-02-18 RX ORDER — HYDROCHLOROTHIAZIDE 12.5 MG/1
12.5 TABLET ORAL DAILY
Qty: 30 TABLET | Refills: 3 | Status: SHIPPED | OUTPATIENT
Start: 2025-02-18

## 2025-02-18 RX ADMIN — LISINOPRIL 20 MG: 20 TABLET ORAL at 10:21

## 2025-02-18 RX ADMIN — SODIUM CHLORIDE, PRESERVATIVE FREE 40 MG: 5 INJECTION INTRAVENOUS at 08:01

## 2025-02-18 RX ADMIN — SODIUM CHLORIDE: 9 INJECTION, SOLUTION INTRAVENOUS at 00:26

## 2025-02-18 RX ADMIN — SENNOSIDES AND DOCUSATE SODIUM 2 TABLET: 50; 8.6 TABLET ORAL at 08:01

## 2025-02-18 RX ADMIN — WATER 1000 MG: 1 INJECTION INTRAMUSCULAR; INTRAVENOUS; SUBCUTANEOUS at 16:13

## 2025-02-18 RX ADMIN — POLYETHYLENE GLYCOL 3350 17 G: 17 POWDER, FOR SOLUTION ORAL at 08:01

## 2025-02-18 RX ADMIN — Medication 500000 UNITS: at 13:32

## 2025-02-18 RX ADMIN — AMLODIPINE BESYLATE 10 MG: 10 TABLET ORAL at 08:01

## 2025-02-18 RX ADMIN — BISACODYL 10 MG: 10 SUPPOSITORY RECTAL at 08:02

## 2025-02-18 RX ADMIN — SODIUM CHLORIDE, PRESERVATIVE FREE 5 ML: 5 INJECTION INTRAVENOUS at 08:02

## 2025-02-18 RX ADMIN — ENOXAPARIN SODIUM 40 MG: 100 INJECTION SUBCUTANEOUS at 09:21

## 2025-02-18 RX ADMIN — Medication 500000 UNITS: at 17:26

## 2025-02-18 RX ADMIN — HYDROCHLOROTHIAZIDE 12.5 MG: 25 TABLET ORAL at 10:20

## 2025-02-18 RX ADMIN — ALBUTEROL SULFATE 2.5 MG: 2.5 SOLUTION RESPIRATORY (INHALATION) at 09:03

## 2025-02-18 RX ADMIN — ALBUTEROL SULFATE 2.5 MG: 2.5 SOLUTION RESPIRATORY (INHALATION) at 20:19

## 2025-02-18 RX ADMIN — Medication 500000 UNITS: at 08:01

## 2025-02-18 NOTE — CARE COORDINATION
Transitional Planning:  Call to [facility] to see if can take patient back later today if testing done today is OK. Left message for Ania to call CM back.    1:00 PM Call back from Ania at [facility] and can accept patient today.  Requested to call when have transportation set up. Referral faxed to [facility] for stretcher transport.    3:00 PM Call from Ania and requested additional documentation, because have no updates in past week and cannot get into [facility]. Faxed requested documents to [facility] at [phone]. Made aware of 8 PM . Family visiting and made wife aware of 8 PM .    4:00 PM Ania called back and still have not received documents. Verified fax number and refaxed to same number.    4:15 PM HENS completed.    5:15 PM Ania called back and still have not received documents. Refaxed to same number.    6:00 PM Faxed AVS/YEE & HENS to [facility].    6:15 PM Call back from Ania and requesting that documents be sent to a different fax [phone]. Additional documents from earlier and AVS/YEE & HENS refaxed to [phone].    Discharge Report    [facility]  Clinical Case Management Department  Written by: Nia Borja RN/DAWOOD    Patient Name: Leland Eduardo II  Attending Provider: No att. providers found  Admit Date: 2025  3:33 PM  MRN: [id number]  Account: [id number]                     : 1960  Discharge Date: 2025      [facility]    Nia Borja. BRENDAN/DAWOOD

## 2025-02-19 ENCOUNTER — TELEPHONE (OUTPATIENT)
Age: 65
End: 2025-02-19

## 2025-02-19 NOTE — TELEPHONE ENCOUNTER
Lacey at [facility] called writer to set up appt for staple removal in our office.

## 2025-02-19 NOTE — TELEPHONE ENCOUNTER
2/19/2025 - received a call from Lacey @ [facility], [phone], inquiring if the patients staples could be removed and if so, could they be removed by them? Our nurse, Sarah ZELAYA, spoke with Lacey. The facility would rather have our office take out the staples.   Patient had a craniotomy on 1/28/25 while inhouse. Staples should have been removed within 10-14 days after surgery.   Called the [facility] and left a message on the director of nursing voicemail with appointment information. 1) staple removal 2/20/25, 2) post op 2/28/25 @ 9:50am

## 2025-02-19 NOTE — PLAN OF CARE
NEUROSURGERY TO SIGN OFF     Please contact Neurosurgery with any questions or acute changes in neurological exam    [facility] Neurosurgery office   [address] [address]  [phone]     Patient can follow up in 2 weeks for post op wound  check    Electronically signed by ADOLPH Rivera NP on 2/9/2025 at 3:18 PM    
  Problem: Discharge Planning  Goal: Discharge to home or other facility with appropriate resources  1/29/2025 1710 by Elva Stearns RN  Outcome: Progressing  1/29/2025 1710 by Elva Stearns RN  Outcome: Progressing     Problem: Skin/Tissue Integrity  Goal: Skin integrity remains intact  Description: 1.  Monitor for areas of redness and/or skin breakdown  2.  Assess vascular access sites hourly  3.  Every 4-6 hours minimum:  Change oxygen saturation probe site  4.  Every 4-6 hours:  If on nasal continuous positive airway pressure, respiratory therapy assess nares and determine need for appliance change or resting period  1/29/2025 1710 by Elva Stearns RN  Outcome: Progressing  1/29/2025 1710 by Elva Stearns RN  Outcome: Progressing     Problem: Safety - Adult  Goal: Free from fall injury  1/29/2025 1710 by Elva Stearns RN  Outcome: Progressing  1/29/2025 1710 by Elva Stearns RN  Outcome: Progressing     Problem: ABCDS Injury Assessment  Goal: Absence of physical injury  1/29/2025 1710 by Elva Stearns RN  Outcome: Progressing  1/29/2025 1710 by Elva Stearns RN  Outcome: Progressing     Problem: Pain  Goal: Verbalizes/displays adequate comfort level or baseline comfort level  1/29/2025 1710 by Elva Stearns RN  Outcome: Progressing  1/29/2025 1710 by Elva Stearns RN  Outcome: Progressing     Problem: Safety - Medical Restraint  Goal: Remains free of injury from restraints (Restraint for Interference with Medical Device)  Description: INTERVENTIONS:  1. Determine that other, less restrictive measures have been tried or would not be effective before applying the restraint  2. Evaluate the patient's condition at the time of restraint application  3. Inform patient/family regarding the reason for restraint  4. Q2H: Monitor safety, psychosocial status, comfort, nutrition and hydration  1/30/2025 0559 by Bhavya Chao, BRENDAN  Outcome: 
  Problem: Discharge Planning  Goal: Discharge to home or other facility with appropriate resources  1/29/2025 1710 by Elva Stearns RN  Outcome: Progressing  1/29/2025 1710 by Elva Stearns RN  Outcome: Progressing     Problem: Skin/Tissue Integrity  Goal: Skin integrity remains intact  Description: 1.  Monitor for areas of redness and/or skin breakdown  2.  Assess vascular access sites hourly  3.  Every 4-6 hours minimum:  Change oxygen saturation probe site  4.  Every 4-6 hours:  If on nasal continuous positive airway pressure, respiratory therapy assess nares and determine need for appliance change or resting period  1/29/2025 1710 by Elva Stearns RN  Outcome: Progressing  1/29/2025 1710 by Elva Stearns RN  Outcome: Progressing     Problem: Safety - Adult  Goal: Free from fall injury  1/29/2025 1710 by Elva Stearns RN  Outcome: Progressing  1/29/2025 1710 by Elva Stearns RN  Outcome: Progressing     Problem: ABCDS Injury Assessment  Goal: Absence of physical injury  1/29/2025 1710 by Elva Stearns RN  Outcome: Progressing  1/29/2025 1710 by Elva Stearns RN  Outcome: Progressing     Problem: Pain  Goal: Verbalizes/displays adequate comfort level or baseline comfort level  1/29/2025 1710 by Elva Stearns RN  Outcome: Progressing  1/29/2025 1710 by Elva Stearns RN  Outcome: Progressing     Problem: Safety - Medical Restraint  Goal: Remains free of injury from restraints (Restraint for Interference with Medical Device)  Description: INTERVENTIONS:  1. Determine that other, less restrictive measures have been tried or would not be effective before applying the restraint  2. Evaluate the patient's condition at the time of restraint application  3. Inform patient/family regarding the reason for restraint  4. Q2H: Monitor safety, psychosocial status, comfort, nutrition and hydration  1/30/2025 0602 by Bhavya Chao, BRENDAN  Outcome: 
  Problem: Discharge Planning  Goal: Discharge to home or other facility with appropriate resources  1/29/2025 1710 by Elva Stearns RN  Outcome: Progressing  1/29/2025 1710 by Elva Stearns RN  Outcome: Progressing  1/29/2025 0400 by Maryellen Hoskins RN  Outcome: Progressing     Problem: Skin/Tissue Integrity  Goal: Skin integrity remains intact  Description: 1.  Monitor for areas of redness and/or skin breakdown  2.  Assess vascular access sites hourly  3.  Every 4-6 hours minimum:  Change oxygen saturation probe site  4.  Every 4-6 hours:  If on nasal continuous positive airway pressure, respiratory therapy assess nares and determine need for appliance change or resting period  1/29/2025 1710 by Elva Stearns RN  Outcome: Progressing  1/29/2025 1710 by Elva Stearns RN  Outcome: Progressing  1/29/2025 0400 by Maryellen Hoskins RN  Outcome: Progressing     Problem: Safety - Adult  Goal: Free from fall injury  1/29/2025 1710 by Elva Stearns RN  Outcome: Progressing  1/29/2025 1710 by Elva Stearns RN  Outcome: Progressing  1/29/2025 0400 by Maryellen Hoskins RN  Outcome: Progressing     Problem: ABCDS Injury Assessment  Goal: Absence of physical injury  1/29/2025 1710 by Elva Stearns RN  Outcome: Progressing  1/29/2025 1710 by Elva Stearns RN  Outcome: Progressing  1/29/2025 0400 by Maryellen Hoskins RN  Outcome: Progressing     Problem: Pain  Goal: Verbalizes/displays adequate comfort level or baseline comfort level  1/29/2025 1710 by Elva Stearns RN  Outcome: Progressing  1/29/2025 1710 by Elva Stearns RN  Outcome: Progressing  1/29/2025 0400 by Maryellen Hoskins RN  Outcome: Progressing     Problem: Safety - Medical Restraint  Goal: Remains free of injury from restraints (Restraint for Interference with Medical Device)  Description: INTERVENTIONS:  1. Determine that other, less restrictive measures have been tried or would not be effective 
  Problem: Discharge Planning  Goal: Discharge to home or other facility with appropriate resources  1/31/2025 0507 by [place] Schofield RN  Outcome: Progressing  1/30/2025 2110 by Ella Li RN  Outcome: Progressing     Problem: Skin/Tissue Integrity  Goal: Skin integrity remains intact  Description: 1.  Monitor for areas of redness and/or skin breakdown  2.  Assess vascular access sites hourly  3.  Every 4-6 hours minimum:  Change oxygen saturation probe site  4.  Every 4-6 hours:  If on nasal continuous positive airway pressure, respiratory therapy assess nares and determine need for appliance change or resting period  1/31/2025 0507 by [place] Schofield RN  Outcome: Progressing  1/30/2025 2110 by Ella Li RN  Outcome: Progressing     Problem: Safety - Adult  Goal: Free from fall injury  1/31/2025 0507 by [place] Schofield RN  Outcome: Progressing  1/30/2025 2110 by Ella Li RN  Outcome: Progressing     Problem: ABCDS Injury Assessment  Goal: Absence of physical injury  1/31/2025 0507 by [place] Schofield RN  Outcome: Progressing  1/30/2025 2110 by Ella Li RN  Outcome: Progressing     Problem: Pain  Goal: Verbalizes/displays adequate comfort level or baseline comfort level  1/31/2025 0507 by [place] Schofield RN  Outcome: Progressing  1/30/2025 2110 by Ella Li RN  Outcome: Progressing     Problem: Safety - Medical Restraint  Goal: Remains free of injury from restraints (Restraint for Interference with Medical Device)  Description: INTERVENTIONS:  1. Determine that other, less restrictive measures have been tried or would not be effective before applying the restraint  2. Evaluate the patient's condition at the time of restraint application  3. Inform patient/family regarding the reason for restraint  4. Q2H: Monitor safety, psychosocial status, comfort, nutrition and hydration  1/31/2025 0507 by [place] Schofield RN  Outcome: Progressing  1/30/2025 2110 by Ella Li RN  Outcome: 
  Problem: Discharge Planning  Goal: Discharge to home or other facility with appropriate resources  2/1/2025 0906 by Rodney Szymanski RN  Outcome: Progressing  2/1/2025 0715 by Reema Slade RN  Outcome: Progressing     Problem: Skin/Tissue Integrity  Goal: Skin integrity remains intact  Description: 1.  Monitor for areas of redness and/or skin breakdown  2.  Assess vascular access sites hourly  3.  Every 4-6 hours minimum:  Change oxygen saturation probe site  4.  Every 4-6 hours:  If on nasal continuous positive airway pressure, respiratory therapy assess nares and determine need for appliance change or resting period  2/1/2025 0906 by Rodney Szymanski RN  Outcome: Progressing  2/1/2025 0715 by Reema Slade RN  Outcome: Progressing     Problem: Safety - Adult  Goal: Free from fall injury  2/1/2025 0906 by Rodney Szymanski RN  Outcome: Progressing  2/1/2025 0715 by Reema Slade RN  Outcome: Progressing     Problem: ABCDS Injury Assessment  Goal: Absence of physical injury  2/1/2025 0906 by Rodney Szymanski RN  Outcome: Progressing  2/1/2025 0715 by Reema Slade RN  Outcome: Progressing     Problem: Pain  Goal: Verbalizes/displays adequate comfort level or baseline comfort level  2/1/2025 0906 by Rodney Szymanski RN  Outcome: Progressing  2/1/2025 0715 by Reema Slade RN  Outcome: Progressing     Problem: Safety - Medical Restraint  Goal: Remains free of injury from restraints (Restraint for Interference with Medical Device)  Description: INTERVENTIONS:  1. Determine that other, less restrictive measures have been tried or would not be effective before applying the restraint  2. Evaluate the patient's condition at the time of restraint application  3. Inform patient/family regarding the reason for restraint  4. Q2H: Monitor safety, psychosocial status, comfort, nutrition and hydration  2/1/2025 0906 by Rodney Szymanski RN  Outcome: Progressing  2/1/2025 0715 by Reema Slade RN  Outcome: 
  Problem: Discharge Planning  Goal: Discharge to home or other facility with appropriate resources  2/12/2025 0440 by Shelia Arriaga RN  Outcome: Progressing  2/11/2025 1510 by Eunice Osborne RN  Outcome: Progressing     Problem: Skin/Tissue Integrity  Goal: Skin integrity remains intact  Description: 1.  Monitor for areas of redness and/or skin breakdown  2.  Assess vascular access sites hourly  3.  Every 4-6 hours minimum:  Change oxygen saturation probe site  4.  Every 4-6 hours:  If on nasal continuous positive airway pressure, respiratory therapy assess nares and determine need for appliance change or resting period  2/12/2025 0440 by Shelia Arriaga RN  Outcome: Progressing  Flowsheets (Taken 2/11/2025 2000)  Skin Integrity Remains Intact: Monitor for areas of redness and/or skin breakdown  2/11/2025 1510 by Eunice Osborne RN  Outcome: Progressing     Problem: Safety - Adult  Goal: Free from fall injury  2/12/2025 0440 by Shelia Arriaga RN  Outcome: Progressing  Flowsheets (Taken 2/11/2025 2000)  Free From Fall Injury: Instruct family/caregiver on patient safety  2/11/2025 1510 by Eunice Osborne RN  Outcome: Progressing     Problem: ABCDS Injury Assessment  Goal: Absence of physical injury  2/12/2025 0440 by Shelia Arriaga RN  Outcome: Progressing  Flowsheets (Taken 2/11/2025 2000)  Absence of Physical Injury: Implement safety measures based on patient assessment  2/11/2025 1510 by Eunice Osborne RN  Outcome: Progressing     Problem: Pain  Goal: Verbalizes/displays adequate comfort level or baseline comfort level  2/12/2025 0440 by Shelia Arriaga RN  Outcome: Progressing  2/11/2025 1510 by Eunice Osborne RN  Outcome: Progressing     Problem: Safety - Medical Restraint  Goal: Remains free of injury from restraints (Restraint for Interference with Medical Device)  Description: INTERVENTIONS:  1. Determine that other, less restrictive measures have been tried or would not 
  Problem: Discharge Planning  Goal: Discharge to home or other facility with appropriate resources  2/13/2025 2246 by Chaim Hou RN  Outcome: Progressing  2/13/2025 1354 by Eh Soler RN  Outcome: Progressing     Problem: Skin/Tissue Integrity  Goal: Skin integrity remains intact  Description: 1.  Monitor for areas of redness and/or skin breakdown  2.  Assess vascular access sites hourly  3.  Every 4-6 hours minimum:  Change oxygen saturation probe site  4.  Every 4-6 hours:  If on nasal continuous positive airway pressure, respiratory therapy assess nares and determine need for appliance change or resting period  2/13/2025 2246 by Chaim Hou RN  Outcome: Progressing  2/13/2025 1354 by Eh Soler RN  Outcome: Progressing     Problem: Safety - Adult  Goal: Free from fall injury  2/13/2025 2246 by Chaim Hou RN  Outcome: Progressing  2/13/2025 1354 by Eh Soler RN  Outcome: Progressing     Problem: ABCDS Injury Assessment  Goal: Absence of physical injury  2/13/2025 2246 by Chaim Hou RN  Outcome: Progressing  2/13/2025 1354 by Eh Soler RN  Outcome: Progressing     Problem: Pain  Goal: Verbalizes/displays adequate comfort level or baseline comfort level  2/13/2025 2246 by Chaim Hou RN  Outcome: Progressing  2/13/2025 1354 by Eh Soler RN  Outcome: Progressing     Problem: Respiratory - Adult  Goal: Achieves optimal ventilation and oxygenation  2/13/2025 2246 by Chaim Hou RN  Outcome: Progressing  2/13/2025 1354 by Eh Soler RN  Outcome: Progressing     Problem: Nutrition Deficit:  Goal: Optimize nutritional status  2/13/2025 2246 by Chaim Hou RN  Outcome: Progressing  2/13/2025 1354 by Eh Soler RN  Outcome: Progressing     
  Problem: Discharge Planning  Goal: Discharge to home or other facility with appropriate resources  2/15/2025 0344 by Yamel Richards RN  Outcome: Progressing  2/14/2025 1824 by Lorie Jane RN  Outcome: Progressing     Problem: Skin/Tissue Integrity  Goal: Skin integrity remains intact  Description: 1.  Monitor for areas of redness and/or skin breakdown  2.  Assess vascular access sites hourly  3.  Every 4-6 hours minimum:  Change oxygen saturation probe site  4.  Every 4-6 hours:  If on nasal continuous positive airway pressure, respiratory therapy assess nares and determine need for appliance change or resting period  2/15/2025 0344 by Yamel Richards RN  Outcome: Progressing  2/14/2025 1824 by Lorie Jane RN  Outcome: Progressing     Problem: Safety - Adult  Goal: Free from fall injury  2/15/2025 0344 by Yamel Richards RN  Outcome: Progressing  2/14/2025 1824 by Lorie Jane RN  Outcome: Progressing     Problem: ABCDS Injury Assessment  Goal: Absence of physical injury  2/15/2025 0344 by Ymael Richards RN  Outcome: Progressing  2/14/2025 1824 by Lorie Jane RN  Outcome: Progressing     Problem: Pain  Goal: Verbalizes/displays adequate comfort level or baseline comfort level  2/15/2025 0344 by Yamel Richards RN  Outcome: Progressing  2/14/2025 1824 by Lorie Jane RN  Outcome: Progressing     Problem: Respiratory - Adult  Goal: Achieves optimal ventilation and oxygenation  2/15/2025 0344 by Yamel Richards RN  Outcome: Progressing  2/14/2025 1824 by Lorie Jane RN  Outcome: Progressing     Problem: Nutrition Deficit:  Goal: Optimize nutritional status  2/15/2025 0344 by Yamel Richards RN  Outcome: Progressing  2/14/2025 1824 by Lorie Jane RN  Outcome: Progressing     
  Problem: Discharge Planning  Goal: Discharge to home or other facility with appropriate resources  2/18/2025 0659 by Nasra Chance RN  Outcome: Progressing  2/17/2025 1841 by Frankie Gaines RN  Outcome: Progressing     Problem: Skin/Tissue Integrity  Goal: Skin integrity remains intact  Description: 1.  Monitor for areas of redness and/or skin breakdown  2.  Assess vascular access sites hourly  3.  Every 4-6 hours minimum:  Change oxygen saturation probe site  4.  Every 4-6 hours:  If on nasal continuous positive airway pressure, respiratory therapy assess nares and determine need for appliance change or resting period  2/18/2025 0659 by Nasra Chance RN  Outcome: Progressing  2/17/2025 1841 by Frankie Gaines RN  Outcome: Progressing     Problem: Safety - Adult  Goal: Free from fall injury  2/18/2025 0659 by Nasra Chance RN  Outcome: Progressing  2/17/2025 1841 by Frankie Gaines RN  Outcome: Progressing     Problem: ABCDS Injury Assessment  Goal: Absence of physical injury  2/18/2025 0659 by Nasra Chance RN  Outcome: Progressing  2/17/2025 1841 by Frankie Gaines RN  Outcome: Progressing     Problem: Pain  Goal: Verbalizes/displays adequate comfort level or baseline comfort level  2/18/2025 0659 by Nasra Chance RN  Outcome: Progressing  2/17/2025 1841 by Frankie Gaines RN  Outcome: Progressing     Problem: Respiratory - Adult  Goal: Achieves optimal ventilation and oxygenation  2/18/2025 0659 by Nasra Chance RN  Outcome: Progressing  2/17/2025 1841 by Frankie Gaines RN  Outcome: Progressing  Flowsheets (Taken 2/17/2025 0820 by Yelena Costa RCP)  Achieves optimal ventilation and oxygenation:   Assess for changes in respiratory status   Assess for changes in mentation and behavior   Position to facilitate oxygenation and minimize respiratory effort   Oxygen supplementation based on oxygen saturation or arterial blood gases   Encourage broncho-pulmonary hygiene including cough, 
  Problem: Discharge Planning  Goal: Discharge to home or other facility with appropriate resources  2/18/2025 1820 by Chelsi Siegel, RN  Outcome: Progressing     Problem: Skin/Tissue Integrity  Goal: Skin integrity remains intact  Description: 1.  Monitor for areas of redness and/or skin breakdown  2.  Assess vascular access sites hourly  3.  Every 4-6 hours minimum:  Change oxygen saturation probe site  4.  Every 4-6 hours:  If on nasal continuous positive airway pressure, respiratory therapy assess nares and determine need for appliance change or resting period  2/18/2025 1820 by Chelsi Siegel, RN  Outcome: Progressing     Problem: Safety - Adult  Goal: Free from fall injury  2/18/2025 1820 by Chelsi Siegel, RN  Outcome: Progressing     
  Problem: Discharge Planning  Goal: Discharge to home or other facility with appropriate resources  2/2/2025 0919 by Rodney Szymanski RN  Outcome: Progressing  2/2/2025 0628 by Reema Slade RN  Outcome: Progressing     Problem: Skin/Tissue Integrity  Goal: Skin integrity remains intact  Description: 1.  Monitor for areas of redness and/or skin breakdown  2.  Assess vascular access sites hourly  3.  Every 4-6 hours minimum:  Change oxygen saturation probe site  4.  Every 4-6 hours:  If on nasal continuous positive airway pressure, respiratory therapy assess nares and determine need for appliance change or resting period  2/2/2025 0919 by Rodney Szymanski RN  Outcome: Progressing  2/2/2025 0628 by Reema Slade RN  Outcome: Progressing     Problem: Safety - Adult  Goal: Free from fall injury  2/2/2025 0919 by Rodney Szymanski RN  Outcome: Progressing  2/2/2025 0628 by Reema Slade RN  Outcome: Progressing     Problem: ABCDS Injury Assessment  Goal: Absence of physical injury  2/2/2025 0919 by Rodney Szymanski RN  Outcome: Progressing  2/2/2025 0628 by Reema Slade RN  Outcome: Progressing     Problem: Pain  Goal: Verbalizes/displays adequate comfort level or baseline comfort level  2/2/2025 0919 by Rodney Szymanski RN  Outcome: Progressing  2/2/2025 0628 by Reema Slade RN  Outcome: Progressing     Problem: Safety - Medical Restraint  Goal: Remains free of injury from restraints (Restraint for Interference with Medical Device)  Description: INTERVENTIONS:  1. Determine that other, less restrictive measures have been tried or would not be effective before applying the restraint  2. Evaluate the patient's condition at the time of restraint application  3. Inform patient/family regarding the reason for restraint  4. Q2H: Monitor safety, psychosocial status, comfort, nutrition and hydration  2/2/2025 0919 by Rodney Szymanski RN  Outcome: Progressing  2/2/2025 0628 by Reema Slade RN  Outcome: 
  Problem: Discharge Planning  Goal: Discharge to home or other facility with appropriate resources  Outcome: Progressing     Problem: Skin/Tissue Integrity  Goal: Skin integrity remains intact  Description: 1.  Monitor for areas of redness and/or skin breakdown  2.  Assess vascular access sites hourly  3.  Every 4-6 hours minimum:  Change oxygen saturation probe site  4.  Every 4-6 hours:  If on nasal continuous positive airway pressure, respiratory therapy assess nares and determine need for appliance change or resting period  Outcome: Progressing     Problem: Safety - Adult  Goal: Free from fall injury  2/10/2025 0434 by Jordyn Lira, RN  Outcome: Progressing  2/9/2025 1510 by Verona Maurice RN  Outcome: Progressing     Problem: ABCDS Injury Assessment  Goal: Absence of physical injury  Outcome: Progressing     Problem: Pain  Goal: Verbalizes/displays adequate comfort level or baseline comfort level  Outcome: Progressing     Problem: Safety - Medical Restraint  Goal: Remains free of injury from restraints (Restraint for Interference with Medical Device)  Description: INTERVENTIONS:  1. Determine that other, less restrictive measures have been tried or would not be effective before applying the restraint  2. Evaluate the patient's condition at the time of restraint application  3. Inform patient/family regarding the reason for restraint  4. Q2H: Monitor safety, psychosocial status, comfort, nutrition and hydration  Outcome: Progressing     Problem: Respiratory - Adult  Goal: Achieves optimal ventilation and oxygenation  2/10/2025 0434 by Jordyn Lira, RN  Outcome: Progressing  2/9/2025 2015 by Nguyễn Rios RCP  Outcome: Progressing     Problem: Nutrition Deficit:  Goal: Optimize nutritional status  Outcome: Progressing     
  Problem: Discharge Planning  Goal: Discharge to home or other facility with appropriate resources  Outcome: Progressing     Problem: Skin/Tissue Integrity  Goal: Skin integrity remains intact  Description: 1.  Monitor for areas of redness and/or skin breakdown  2.  Assess vascular access sites hourly  3.  Every 4-6 hours minimum:  Change oxygen saturation probe site  4.  Every 4-6 hours:  If on nasal continuous positive airway pressure, respiratory therapy assess nares and determine need for appliance change or resting period  Outcome: Progressing     Problem: Safety - Adult  Goal: Free from fall injury  2/7/2025 2224 by Ileana Fox, RN  Outcome: Progressing  2/7/2025 1513 by Verona Maurice, RN  Outcome: Progressing     Problem: ABCDS Injury Assessment  Goal: Absence of physical injury  Outcome: Progressing     Problem: Pain  Goal: Verbalizes/displays adequate comfort level or baseline comfort level  Outcome: Progressing     Problem: Safety - Medical Restraint  Goal: Remains free of injury from restraints (Restraint for Interference with Medical Device)  Description: INTERVENTIONS:  1. Determine that other, less restrictive measures have been tried or would not be effective before applying the restraint  2. Evaluate the patient's condition at the time of restraint application  3. Inform patient/family regarding the reason for restraint  4. Q2H: Monitor safety, psychosocial status, comfort, nutrition and hydration  Outcome: Progressing     Problem: Respiratory - Adult  Goal: Achieves optimal ventilation and oxygenation  Outcome: Progressing     Problem: Nutrition Deficit:  Goal: Optimize nutritional status  Outcome: Progressing     
  Problem: Discharge Planning  Goal: Discharge to home or other facility with appropriate resources  Outcome: Progressing     Problem: Skin/Tissue Integrity  Goal: Skin integrity remains intact  Description: 1.  Monitor for areas of redness and/or skin breakdown  2.  Assess vascular access sites hourly  3.  Every 4-6 hours minimum:  Change oxygen saturation probe site  4.  Every 4-6 hours:  If on nasal continuous positive airway pressure, respiratory therapy assess nares and determine need for appliance change or resting period  Outcome: Progressing     Problem: Safety - Adult  Goal: Free from fall injury  Outcome: Progressing     Problem: ABCDS Injury Assessment  Goal: Absence of physical injury  Outcome: Progressing     Problem: Pain  Goal: Verbalizes/displays adequate comfort level or baseline comfort level  Outcome: Progressing     Problem: Nutrition Deficit:  Goal: Optimize nutritional status  Outcome: Progressing     
  Problem: Discharge Planning  Goal: Discharge to home or other facility with appropriate resources  Outcome: Progressing     Problem: Skin/Tissue Integrity  Goal: Skin integrity remains intact  Description: 1.  Monitor for areas of redness and/or skin breakdown  2.  Assess vascular access sites hourly  3.  Every 4-6 hours minimum:  Change oxygen saturation probe site  4.  Every 4-6 hours:  If on nasal continuous positive airway pressure, respiratory therapy assess nares and determine need for appliance change or resting period  Outcome: Progressing     Problem: Safety - Adult  Goal: Free from fall injury  Outcome: Progressing     Problem: ABCDS Injury Assessment  Goal: Absence of physical injury  Outcome: Progressing     Problem: Pain  Goal: Verbalizes/displays adequate comfort level or baseline comfort level  Outcome: Progressing     Problem: Respiratory - Adult  Goal: Achieves optimal ventilation and oxygenation  Outcome: Progressing     Problem: Nutrition Deficit:  Goal: Optimize nutritional status  Outcome: Progressing     
  Problem: Discharge Planning  Goal: Discharge to home or other facility with appropriate resources  Outcome: Progressing     Problem: Skin/Tissue Integrity  Goal: Skin integrity remains intact  Description: 1.  Monitor for areas of redness and/or skin breakdown  2.  Assess vascular access sites hourly  3.  Every 4-6 hours minimum:  Change oxygen saturation probe site  4.  Every 4-6 hours:  If on nasal continuous positive airway pressure, respiratory therapy assess nares and determine need for appliance change or resting period  Outcome: Progressing     Problem: Safety - Adult  Goal: Free from fall injury  Outcome: Progressing     Problem: ABCDS Injury Assessment  Goal: Absence of physical injury  Outcome: Progressing     Problem: Pain  Goal: Verbalizes/displays adequate comfort level or baseline comfort level  Outcome: Progressing     Problem: Respiratory - Adult  Goal: Achieves optimal ventilation and oxygenation  Outcome: Progressing  Flowsheets (Taken 2/17/2025 0820 by Yelena Costa, PAYAL)  Achieves optimal ventilation and oxygenation:   Assess for changes in respiratory status   Assess for changes in mentation and behavior   Position to facilitate oxygenation and minimize respiratory effort   Oxygen supplementation based on oxygen saturation or arterial blood gases   Encourage broncho-pulmonary hygiene including cough, deep breathe, incentive spirometry   Assess the need for suctioning and aspirate as needed   Assess and instruct to report shortness of breath or any respiratory difficulty   Respiratory therapy support as indicated     Problem: Nutrition Deficit:  Goal: Optimize nutritional status  Outcome: Progressing     
  Problem: Discharge Planning  Goal: Discharge to home or other facility with appropriate resources  Outcome: Progressing     Problem: Skin/Tissue Integrity  Goal: Skin integrity remains intact  Description: 1.  Monitor for areas of redness and/or skin breakdown  2.  Assess vascular access sites hourly  3.  Every 4-6 hours minimum:  Change oxygen saturation probe site  4.  Every 4-6 hours:  If on nasal continuous positive airway pressure, respiratory therapy assess nares and determine need for appliance change or resting period  Outcome: Progressing     Problem: Safety - Adult  Goal: Free from fall injury  Outcome: Progressing     Problem: ABCDS Injury Assessment  Goal: Absence of physical injury  Outcome: Progressing     Problem: Pain  Goal: Verbalizes/displays adequate comfort level or baseline comfort level  Outcome: Progressing     Problem: Safety - Medical Restraint  Goal: Remains free of injury from restraints (Restraint for Interference with Medical Device)  Description: INTERVENTIONS:  1. Determine that other, less restrictive measures have been tried or would not be effective before applying the restraint  2. Evaluate the patient's condition at the time of restraint application  3. Inform patient/family regarding the reason for restraint  4. Q2H: Monitor safety, psychosocial status, comfort, nutrition and hydration  2/1/2025 0715 by Reema Slade, RN  Outcome: Progressing  Flowsheets  Taken 2/1/2025 0400  Remains free of injury from restraints (restraint for interference with medical device):   Determine that other, less restrictive measures have been tried or would not be effective before applying the restraint   Evaluate the patient's condition at the time of restraint application   Inform patient/family regarding the reason for restraint   Every 2 hours: Monitor safety, psychosocial status, comfort, nutrition and hydration  Taken 2/1/2025 0000  Remains free of injury from restraints (restraint for 
  Problem: Discharge Planning  Goal: Discharge to home or other facility with appropriate resources  Outcome: Progressing     Problem: Skin/Tissue Integrity  Goal: Skin integrity remains intact  Description: 1.  Monitor for areas of redness and/or skin breakdown  2.  Assess vascular access sites hourly  3.  Every 4-6 hours minimum:  Change oxygen saturation probe site  4.  Every 4-6 hours:  If on nasal continuous positive airway pressure, respiratory therapy assess nares and determine need for appliance change or resting period  Outcome: Progressing     Problem: Safety - Adult  Goal: Free from fall injury  Outcome: Progressing     Problem: ABCDS Injury Assessment  Goal: Absence of physical injury  Outcome: Progressing     Problem: Pain  Goal: Verbalizes/displays adequate comfort level or baseline comfort level  Outcome: Progressing     Problem: Safety - Medical Restraint  Goal: Remains free of injury from restraints (Restraint for Interference with Medical Device)  Description: INTERVENTIONS:  1. Determine that other, less restrictive measures have been tried or would not be effective before applying the restraint  2. Evaluate the patient's condition at the time of restraint application  3. Inform patient/family regarding the reason for restraint  4. Q2H: Monitor safety, psychosocial status, comfort, nutrition and hydration  Outcome: Progressing     
  Problem: Discharge Planning  Goal: Discharge to home or other facility with appropriate resources  Outcome: Progressing     Problem: Skin/Tissue Integrity  Goal: Skin integrity remains intact  Description: 1.  Monitor for areas of redness and/or skin breakdown  2.  Assess vascular access sites hourly  3.  Every 4-6 hours minimum:  Change oxygen saturation probe site  4.  Every 4-6 hours:  If on nasal continuous positive airway pressure, respiratory therapy assess nares and determine need for appliance change or resting period  Outcome: Progressing     Problem: Safety - Adult  Goal: Free from fall injury  Outcome: Progressing     Problem: ABCDS Injury Assessment  Goal: Absence of physical injury  Outcome: Progressing     Problem: Pain  Goal: Verbalizes/displays adequate comfort level or baseline comfort level  Outcome: Progressing     Problem: Safety - Medical Restraint  Goal: Remains free of injury from restraints (Restraint for Interference with Medical Device)  Description: INTERVENTIONS:  1. Determine that other, less restrictive measures have been tried or would not be effective before applying the restraint  2. Evaluate the patient's condition at the time of restraint application  3. Inform patient/family regarding the reason for restraint  4. Q2H: Monitor safety, psychosocial status, comfort, nutrition and hydration  Outcome: Progressing     Problem: Respiratory - Adult  Goal: Achieves optimal ventilation and oxygenation  Outcome: Progressing     Problem: Nutrition Deficit:  Goal: Optimize nutritional status  Outcome: Progressing     
  Problem: Discharge Planning  Goal: Discharge to home or other facility with appropriate resources  Outcome: Progressing     Problem: Skin/Tissue Integrity  Goal: Skin integrity remains intact  Description: 1.  Monitor for areas of redness and/or skin breakdown  2.  Assess vascular access sites hourly  3.  Every 4-6 hours minimum:  Change oxygen saturation probe site  4.  Every 4-6 hours:  If on nasal continuous positive airway pressure, respiratory therapy assess nares and determine need for appliance change or resting period  Outcome: Progressing     Problem: Safety - Adult  Goal: Free from fall injury  Outcome: Progressing     Problem: ABCDS Injury Assessment  Goal: Absence of physical injury  Outcome: Progressing     Problem: Pain  Goal: Verbalizes/displays adequate comfort level or baseline comfort level  Outcome: Progressing     Problem: Safety - Medical Restraint  Goal: Remains free of injury from restraints (Restraint for Interference with Medical Device)  Description: INTERVENTIONS:  1. Determine that other, less restrictive measures have been tried or would not be effective before applying the restraint  2. Evaluate the patient's condition at the time of restraint application  3. Inform patient/family regarding the reason for restraint  4. Q2H: Monitor safety, psychosocial status, comfort, nutrition and hydration  Outcome: Progressing  Flowsheets  Taken 1/30/2025 1800  Remains free of injury from restraints (restraint for interference with medical device):   Determine that other, less restrictive measures have been tried or would not be effective before applying the restraint   Evaluate the patient's condition at the time of restraint application   Inform patient/family regarding the reason for restraint   Every 2 hours: Monitor safety, psychosocial status, comfort, nutrition and hydration  Taken 1/30/2025 1600  Remains free of injury from restraints (restraint for interference with medical device):   
  Problem: Discharge Planning  Goal: Discharge to home or other facility with appropriate resources  Outcome: Progressing     Problem: Skin/Tissue Integrity  Goal: Skin integrity remains intact  Description: 1.  Monitor for areas of redness and/or skin breakdown  2.  Assess vascular access sites hourly  3.  Every 4-6 hours minimum:  Change oxygen saturation probe site  4.  Every 4-6 hours:  If on nasal continuous positive airway pressure, respiratory therapy assess nares and determine need for appliance change or resting period  Outcome: Progressing     Problem: Safety - Adult  Goal: Free from fall injury  Outcome: Progressing     Problem: ABCDS Injury Assessment  Goal: Absence of physical injury  Outcome: Progressing     Problem: Pain  Goal: Verbalizes/displays adequate comfort level or baseline comfort level  Outcome: Progressing     Problem: Safety - Medical Restraint  Goal: Remains free of injury from restraints (Restraint for Interference with Medical Device)  Description: INTERVENTIONS:  1. Determine that other, less restrictive measures have been tried or would not be effective before applying the restraint  2. Evaluate the patient's condition at the time of restraint application  3. Inform patient/family regarding the reason for restraint  4. Q2H: Monitor safety, psychosocial status, comfort, nutrition and hydration  Outcome: Progressing  Flowsheets  Taken 2/2/2025 0400  Remains free of injury from restraints (restraint for interference with medical device):   Determine that other, less restrictive measures have been tried or would not be effective before applying the restraint   Inform patient/family regarding the reason for restraint   Evaluate the patient's condition at the time of restraint application   Every 2 hours: Monitor safety, psychosocial status, comfort, nutrition and hydration  Taken 2/2/2025 0000  Remains free of injury from restraints (restraint for interference with medical device):   
  Problem: Discharge Planning  Goal: Discharge to home or other facility with appropriate resources  Outcome: Progressing     Problem: Skin/Tissue Integrity  Goal: Skin integrity remains intact  Description: 1.  Monitor for areas of redness and/or skin breakdown  2.  Assess vascular access sites hourly  3.  Every 4-6 hours minimum:  Change oxygen saturation probe site  4.  Every 4-6 hours:  If on nasal continuous positive airway pressure, respiratory therapy assess nares and determine need for appliance change or resting period  Outcome: Progressing     Problem: Safety - Adult  Goal: Free from fall injury  Outcome: Progressing     Problem: ABCDS Injury Assessment  Goal: Absence of physical injury  Outcome: Progressing     Problem: Pain  Goal: Verbalizes/displays adequate comfort level or baseline comfort level  Outcome: Progressing     Problem: Safety - Medical Restraint  Goal: Remains free of injury from restraints (Restraint for Interference with Medical Device)  Description: INTERVENTIONS:  1. Determine that other, less restrictive measures have been tried or would not be effective before applying the restraint  2. Evaluate the patient's condition at the time of restraint application  3. Inform patient/family regarding the reason for restraint  4. Q2H: Monitor safety, psychosocial status, comfort, nutrition and hydration  Outcome: Progressing  Flowsheets  Taken 2/3/2025 0200 by Yamel Richards, RN  Remains free of injury from restraints (restraint for interference with medical device):   Determine that other, less restrictive measures have been tried or would not be effective before applying the restraint   Evaluate the patient's condition at the time of restraint application   Inform patient/family regarding the reason for restraint   Every 2 hours: Monitor safety, psychosocial status, comfort, nutrition and hydration  Taken 2/3/2025 0000 by Yamel Richards, RN  Remains free of injury from restraints (restraint for 
  Problem: Discharge Planning  Goal: Discharge to home or other facility with appropriate resources  Outcome: Progressing     Problem: Skin/Tissue Integrity  Goal: Skin integrity remains intact  Description: 1.  Monitor for areas of redness and/or skin breakdown  2.  Assess vascular access sites hourly  3.  Every 4-6 hours minimum:  Change oxygen saturation probe site  4.  Every 4-6 hours:  If on nasal continuous positive airway pressure, respiratory therapy assess nares and determine need for appliance change or resting period  Outcome: Progressing  Flowsheets (Taken 2/8/2025 1930)  Skin Integrity Remains Intact: Monitor for areas of redness and/or skin breakdown     Problem: Safety - Adult  Goal: Free from fall injury  Outcome: Progressing     Problem: ABCDS Injury Assessment  Goal: Absence of physical injury  Outcome: Progressing  Flowsheets (Taken 2/8/2025 1930)  Absence of Physical Injury: Implement safety measures based on patient assessment     Problem: Pain  Goal: Verbalizes/displays adequate comfort level or baseline comfort level  Outcome: Progressing     Problem: Safety - Medical Restraint  Goal: Remains free of injury from restraints (Restraint for Interference with Medical Device)  Description: INTERVENTIONS:  1. Determine that other, less restrictive measures have been tried or would not be effective before applying the restraint  2. Evaluate the patient's condition at the time of restraint application  3. Inform patient/family regarding the reason for restraint  4. Q2H: Monitor safety, psychosocial status, comfort, nutrition and hydration  Outcome: Progressing     Problem: Respiratory - Adult  Goal: Achieves optimal ventilation and oxygenation  2/9/2025 0405 by Jordyn Lira, RN  Outcome: Progressing  2/8/2025 2007 by Nguyễn Rois, RCPAYAL  Outcome: Progressing  Flowsheets (Taken 2/8/2025 0742 by Kyree Hoover RCPAYAL)  Achieves optimal ventilation and oxygenation:   Assess for changes in respiratory 
  Problem: Discharge Planning  Goal: Discharge to home or other facility with appropriate resources  Outcome: Progressing  Flowsheets (Taken 2/6/2025 2000)  Discharge to home or other facility with appropriate resources: Identify barriers to discharge with patient and caregiver     Problem: Skin/Tissue Integrity  Goal: Skin integrity remains intact  Description: 1.  Monitor for areas of redness and/or skin breakdown  2.  Assess vascular access sites hourly  3.  Every 4-6 hours minimum:  Change oxygen saturation probe site  4.  Every 4-6 hours:  If on nasal continuous positive airway pressure, respiratory therapy assess nares and determine need for appliance change or resting period  Outcome: Progressing  Flowsheets (Taken 2/6/2025 2000)  Skin Integrity Remains Intact: Monitor for areas of redness and/or skin breakdown     Problem: Safety - Adult  Goal: Free from fall injury  Outcome: Progressing  Flowsheets (Taken 2/6/2025 2000)  Free From Fall Injury: Instruct family/caregiver on patient safety     
  Problem: Respiratory - Adult  Goal: Achieves optimal ventilation and oxygenation  2/10/2025 0820 by Amy Erickson RCP  Outcome: Progressing  Flowsheets (Taken 2/10/2025 0818)  Achieves optimal ventilation and oxygenation:   Assess for changes in respiratory status   Assess for changes in mentation and behavior   Position to facilitate oxygenation and minimize respiratory effort   Oxygen supplementation based on oxygen saturation or arterial blood gases   Encourage broncho-pulmonary hygiene including cough, deep breathe, incentive spirometry   Assess the need for suctioning and aspirate as needed   Assess and instruct to report shortness of breath or any respiratory difficulty   Respiratory therapy support as indicated     
  Problem: Respiratory - Adult  Goal: Achieves optimal ventilation and oxygenation  2/10/2025 2002 by Nguyễn Rios RCP  Outcome: Progressing   BRONCHOSPASM/BRONCHOCONSTRICTION     [x]         IMPROVE AERATION/BREATH SOUNDS  [x]   ADMINISTER BRONCHODILATOR THERAPY AS APPROPRIATE  [x]   ASSESS BREATH SOUNDS  []   IMPLEMENT AEROSOL/MDI PROTOCOL  [x]   PATIENT EDUCATION AS NEEDED    
  Problem: Respiratory - Adult  Goal: Achieves optimal ventilation and oxygenation  2/12/2025 1204 by Maite Murcia, RCP  Outcome: Progressing  Flowsheets (Taken 2/12/2025 1159)  Achieves optimal ventilation and oxygenation:   Assess for changes in respiratory status   Oxygen supplementation based on oxygen saturation or arterial blood gases   Assess and instruct to report shortness of breath or any respiratory difficulty   Respiratory therapy support as indicated     
  Problem: Respiratory - Adult  Goal: Achieves optimal ventilation and oxygenation  2/12/2025 2013 by Lisa Ellington, LILI  Outcome: Progressing   BRONCHOSPASM/BRONCHOCONSTRICTION     [x]         IMPROVE AERATION/BREATH SOUNDS  [x]   ADMINISTER BRONCHODILATOR THERAPY AS APPROPRIATE  [x]   ASSESS BREATH SOUNDS  []   IMPLEMENT AEROSOL/MDI PROTOCOL  [x]   PATIENT EDUCATION AS NEEDED    
  Problem: Respiratory - Adult  Goal: Achieves optimal ventilation and oxygenation  2/13/2025 0830 by Maite Murcia, RCPAYAL  Outcome: Progressing  Flowsheets (Taken 2/13/2025 0824)  Achieves optimal ventilation and oxygenation:   Assess for changes in respiratory status   Oxygen supplementation based on oxygen saturation or arterial blood gases   Assess the need for suctioning and aspirate as needed   Respiratory therapy support as indicated   Assess and instruct to report shortness of breath or any respiratory difficulty     
  Problem: Respiratory - Adult  Goal: Achieves optimal ventilation and oxygenation  2/18/2025 2021 by Lisa Ellington, LILI  Outcome: Progressing   BRONCHOSPASM/BRONCHOCONSTRICTION     [x]         IMPROVE AERATION/BREATH SOUNDS  [x]   ADMINISTER BRONCHODILATOR THERAPY AS APPROPRIATE  [x]   ASSESS BREATH SOUNDS  []   IMPLEMENT AEROSOL/MDI PROTOCOL  [x]   PATIENT EDUCATION AS NEEDED    
  Problem: Respiratory - Adult  Goal: Achieves optimal ventilation and oxygenation  Outcome: Progressing     
  Problem: Respiratory - Adult  Goal: Achieves optimal ventilation and oxygenation  Outcome: Progressing   BRONCHOSPASM/BRONCHOCONSTRICTION     [x]         IMPROVE AERATION/BREATH SOUNDS  [x]   ADMINISTER BRONCHODILATOR THERAPY AS APPROPRIATE  [x]   ASSESS BREATH SOUNDS  []   IMPLEMENT AEROSOL/MDI PROTOCOL  [x]   PATIENT EDUCATION AS NEEDED    
  Problem: Respiratory - Adult  Goal: Achieves optimal ventilation and oxygenation  Outcome: ProgressingBRONCHOSPASM/BRONCHOCONSTRICTION     [x]         IMPROVE AERATION/BREATH SOUNDS  [x]   ADMINISTER BRONCHODILATOR THERAPY AS APPROPRIATE  [x]   ASSESS BREATH SOUNDS  []   IMPLEMENT AEROSOL/MDI PROTOCOL  [x]   PATIENT EDUCATION AS NEEDED    
  Problem: Safety - Adult  Goal: Free from fall injury  2/7/2025 1513 by Verona Maurice, RN  Outcome: Progressing  2/7/2025 0611 by Licha Fink, RN  Outcome: Progressing  Flowsheets (Taken 2/6/2025 2000)  Free From Fall Injury: Instruct family/caregiver on patient safety     
  Problem: Safety - Adult  Goal: Free from fall injury  2/9/2025 1510 by Verona Maurice, RN  Outcome: Progressing  2/9/2025 0405 by Jordyn Lira, RN  Outcome: Progressing     
  Problem: Safety - Medical Restraint  Goal: Remains free of injury from restraints (Restraint for Interference with Medical Device)  Description: INTERVENTIONS:  1. Determine that other, less restrictive measures have been tried or would not be effective before applying the restraint  2. Evaluate the patient's condition at the time of restraint application  3. Inform patient/family regarding the reason for restraint  4. Q2H: Monitor safety, psychosocial status, comfort, nutrition and hydration  1/31/2025 1842 by Chelsi Siegel RN  Outcome: Progressing  Flowsheets  Taken 1/31/2025 1800  Remains free of injury from restraints (restraint for interference with medical device):   Determine that other, less restrictive measures have been tried or would not be effective before applying the restraint   Evaluate the patient's condition at the time of restraint application   Inform patient/family regarding the reason for restraint   Every 2 hours: Monitor safety, psychosocial status, comfort, nutrition and hydration  Taken 1/31/2025 1600  Remains free of injury from restraints (restraint for interference with medical device):   Determine that other, less restrictive measures have been tried or would not be effective before applying the restraint   Evaluate the patient's condition at the time of restraint application   Inform patient/family regarding the reason for restraint   Every 2 hours: Monitor safety, psychosocial status, comfort, nutrition and hydration  Taken 1/31/2025 1400  Remains free of injury from restraints (restraint for interference with medical device):   Determine that other, less restrictive measures have been tried or would not be effective before applying the restraint   Evaluate the patient's condition at the time of restraint application   Inform patient/family regarding the reason for restraint   Every 2 hours: Monitor safety, psychosocial status, comfort, nutrition and hydration  Taken 1/31/2025 
  Problem: Safety - Medical Restraint  Goal: Remains free of injury from restraints (Restraint for Interference with Medical Device)  Description: INTERVENTIONS:  1. Determine that other, less restrictive measures have been tried or would not be effective before applying the restraint  2. Evaluate the patient's condition at the time of restraint application  3. Inform patient/family regarding the reason for restraint  4. Q2H: Monitor safety, psychosocial status, comfort, nutrition and hydration  2/3/2025 1627 by Chelsi Siegel RN  Outcome: Progressing  Flowsheets (Taken 2/3/2025 1600)  Remains free of injury from restraints (restraint for interference with medical device):   Determine that other, less restrictive measures have been tried or would not be effective before applying the restraint   Evaluate the patient's condition at the time of restraint application   Inform patient/family regarding the reason for restraint   Every 2 hours: Monitor safety, psychosocial status, comfort, nutrition and hydration     
  Problem: Skin/Tissue Integrity  Goal: Skin integrity remains intact  Description: 1.  Monitor for areas of redness and/or skin breakdown  2.  Assess vascular access sites hourly  3.  Every 4-6 hours minimum:  Change oxygen saturation probe site  4.  Every 4-6 hours:  If on nasal continuous positive airway pressure, respiratory therapy assess nares and determine need for appliance change or resting period  Recent Flowsheet Documentation  Taken 2/10/2025 2000 by Yelena Bullard RN  Skin Integrity Remains Intact: Monitor for areas of redness and/or skin breakdown     Problem: Safety - Adult  Goal: Free from fall injury  Recent Flowsheet Documentation  Taken 2/10/2025 2000 by Yelena Bullard RN  Free From Fall Injury: Instruct family/caregiver on patient safety     Problem: ABCDS Injury Assessment  Goal: Absence of physical injury  Recent Flowsheet Documentation  Taken 2/10/2025 2000 by Yelena Bullard RN  Absence of Physical Injury: Implement safety measures based on patient assessment     Problem: Respiratory - Adult  Goal: Achieves optimal ventilation and oxygenation  2/10/2025 2002 by Nguyễn Rios RCP  Outcome: Progressing     
BRONCHOSPASM/BRONCHOCONSTRICTION     [x]         IMPROVE AERATION/BREATH SOUNDS  [x]   ADMINISTER BRONCHODILATOR THERAPY AS APPROPRIATE  [x]   ASSESS BREATH SOUNDS  []   IMPLEMENT AEROSOL/MDI PROTOCOL  [x]   PATIENT EDUCATION AS NEEDED      Problem: Respiratory - Adult  Goal: Achieves optimal ventilation and oxygenation  2/11/2025 2005 by Marcos Quinteros RCP  Outcome: Progressing    
GI consulted for PEG tube    Chart reviewed    This is a 64-year-old male with a history of HTN, CAD s/p stent, COPD who presented with right-sided weakness and decreased alertness.  Patient was found to have large left hemispheric ICH s/p craniotomy and hematoma evacuation.  Modified barium swallow shows gross aspiration and GI consulted for PEG tube.    KUB suggestive of ileus.  Likely associated with hypokalemia, currently being corrected    Further electrolyte derangements include hypernatremia and hyperchloremia which is worsening.       PLAN:   We will tentatively plan for EGD this week.  Recommend correcting electrolytes including hypernatremia and hyperchloremia.  Keep potassium > 4 and Mag > 2.   Mag citrate this evening  Check KUB in a.m.    Case reviewed with Dr. Elise  Full consult note to follow tomorrow  ADOLPH Lozano - CNP                
GI note    GI consulted for PEG tube placement.    EGD with PEG tube placement planned for today.  Endoscopic procedure canceled by anesthesiologist Dr Gaming due to respiratory status and increasing WBC.       GI will recommend IR guided G tube placement. Dr Lockhart notified   GI will sign off.     Sera Small, APRN - CNP                 
GI plan of care:  GI reconsulted for consideration for PEG tube placement  Imaging reviewed no signs of ascites  Discussed with wife who is in agreement for PEG tube    Please make patient n.p.o. at midnight  Hold a.m. Lovenox  Patient will be scheduled for 7:30 AM in the OR  Complete plan of care to follow endoscopy    Rogers Teresa CNP  
(restraint for interference with medical device):   Evaluate the patient's condition at the time of restraint application   Determine that other, less restrictive measures have been tried or would not be effective before applying the restraint   Inform patient/family regarding the reason for restraint   Every 2 hours: Monitor safety, psychosocial status, comfort, nutrition and hydration     Problem: Respiratory - Adult  Goal: Achieves optimal ventilation and oxygenation  Outcome: Progressing     Problem: Nutrition Deficit:  Goal: Optimize nutritional status  Outcome: Progressing     
Progressing  Flowsheets (Taken 2/12/2025 2000)  Verbalizes/displays adequate comfort level or baseline comfort level:   Encourage patient to monitor pain and request assistance   Assess pain using appropriate pain scale   Administer analgesics based on type and severity of pain and evaluate response   Implement non-pharmacological measures as appropriate and evaluate response   Consider cultural and social influences on pain and pain management   Notify Licensed Independent Practitioner if interventions unsuccessful or patient reports new pain     Problem: Safety - Medical Restraint  Goal: Remains free of injury from restraints (Restraint for Interference with Medical Device)  Description: INTERVENTIONS:  1. Determine that other, less restrictive measures have been tried or would not be effective before applying the restraint  2. Evaluate the patient's condition at the time of restraint application  3. Inform patient/family regarding the reason for restraint  4. Q2H: Monitor safety, psychosocial status, comfort, nutrition and hydration  Outcome: Completed  Flowsheets (Taken 2/4/2025 0000 by Betsey Lim, RN)  Remains free of injury from restraints (restraint for interference with medical device):   Determine that other, less restrictive measures have been tried or would not be effective before applying the restraint   Evaluate the patient's condition at the time of restraint application   Inform patient/family regarding the reason for restraint   Every 2 hours: Monitor safety, psychosocial status, comfort, nutrition and hydration     Problem: Respiratory - Adult  Goal: Achieves optimal ventilation and oxygenation  2/13/2025 0108 by Isabella Kim, RN  Outcome: Progressing  Flowsheets (Taken 2/12/2025 2000)  Achieves optimal ventilation and oxygenation:   Assess for changes in respiratory status   Assess for changes in mentation and behavior   Position to facilitate oxygenation and minimize respiratory effort   
Assess vascular access sites hourly   Every 4-6 hours minimum: Change oxygen saturation probe site  Taken 2/12/2025 2000  Skin Integrity Remains Intact:   Monitor for areas of redness and/or skin breakdown   Assess vascular access sites hourly   Every 4-6 hours minimum: Change oxygen saturation probe site     Problem: Safety - Adult  Goal: Free from fall injury  2/13/2025 1354 by Eh Soler RN  Outcome: Progressing  2/13/2025 0108 by Isabella Kim RN  Outcome: Progressing  Flowsheets (Taken 2/12/2025 2158)  Free From Fall Injury:   Instruct family/caregiver on patient safety   Based on caregiver fall risk screen, instruct family/caregiver to ask for assistance with transferring infant if caregiver noted to have fall risk factors     Problem: ABCDS Injury Assessment  Goal: Absence of physical injury  2/13/2025 1354 by Eh Soler RN  Outcome: Progressing  2/13/2025 0108 by Isabella Kim RN  Outcome: Progressing  Flowsheets (Taken 2/12/2025 2158)  Absence of Physical Injury: Implement safety measures based on patient assessment     Problem: Pain  Goal: Verbalizes/displays adequate comfort level or baseline comfort level  2/13/2025 1354 by Eh Soler RN  Outcome: Progressing  2/13/2025 0108 by Isabella Kim RN  Outcome: Progressing  Flowsheets (Taken 2/12/2025 2000)  Verbalizes/displays adequate comfort level or baseline comfort level:   Encourage patient to monitor pain and request assistance   Assess pain using appropriate pain scale   Administer analgesics based on type and severity of pain and evaluate response   Implement non-pharmacological measures as appropriate and evaluate response   Consider cultural and social influences on pain and pain management   Notify Licensed Independent Practitioner if interventions unsuccessful or patient reports new pain

## 2025-02-19 NOTE — TELEPHONE ENCOUNTER
Lacey at [facility] called nurse line stating that pt was admitted to their facility on 02/18/2025 from [facility]. Pt had a craniotomy hematoma evacuation on 01/28/2025 and staples are intact. SNF nurse states they have been keeping the incision JERRI cleansing with NS and would like to remove staples today. No s/s of infection, appears to be healing well per SNF nurse and unit manager. Staples will be removed, will call our office to set up an appt as soon as all paperwork is received from [facility].

## 2025-02-19 NOTE — PROGRESS NOTES
[facility] -   Infectious diseases evaluation  admission date 1/28/2025    reason for consultation:   Fever bandemia persistent    Impression :   Current:  large left basal ganglia and thalamus bleed w large mass effect   R SIDE PARALYSIS AND global aphasia  1/28/24 Post craniotomy and hematoma evacuation  and dura graft  Surg site clean  Bandemia  Low grade fever  Hypernatremia - iatrogenic  Resp failure - extubated 1/30  Dysphagia - failed swall study - NGT  2/8/25 CT chest - bilat tracheal layering secretions - bronchiectasis  2/18 cystitis    Other:    Discussion / summary of stay / plan of care/ Recommendations:     HENCE:   Fever bandemia - BC and UA neg -CT AP 2/6 no bilat LL  pneumonia  - CXR neg 2/6  Post zosyn 2/1 - 2/4 and made no difference  NGT pulled  - post  Peg 2/12  CT head / sinuses - no  sinusitis 2/7 2/7 Periph iv is 5 day old -   2/13 still present -9 d old -remived  CT chest again 2/8 no  pneumonia  - secretions  in the main bronchi bilat --  CXR 2/10 no pneumonia   2/17 - U cx clean catch showing E coli pend sensi - ceftriaxone -switch to po levaquin  till 2/23 reconsiled -     Otherwise recent  fever  and bandemia might have  been a central      Infection Control Recommendations   [place] Precautions  Contact Isolation       Antimicrobial Stewardship Recommendations   Simplification of therapy  Targeted therapy  History of Present Illness:   Initial history:  Leland Eduardo II is a 64 y.o.-year-old male w CAD COPD and CHF  New onset R side weakness and confusion, EMS found him w higBP, intubated sedated.  CT large left basal ganglia and thalamus bleed w large mass effect  Extubated 1/30 and developed fever and bandemia, still not following commands, aphasic  Peg  2/7 planned  - for now still has NGT  Hypernatremia  due to hypertonic    Given zosyn but bandemia persistent   ID called for opinion and rule  out infection    On off LGF since 
          [facility] -   Infectious diseases evaluation  admission date 1/28/2025    reason for consultation:   Fever bandemia persistent    Impression :   Current:  large left basal ganglia and thalamus bleed w large mass effect   R SIDE PARALYSIS AND global aphasia  1/28/24 Post craniotomy and hematoma evacuation  and dura graft  Surg site clean  Bandemia  Low grade fever  Hypernatremia - iatrogenic  Resp failure - extubated 1/30  Dysphagia - failed swall study - NGT  2/8/25 CT chest - bilat tracheal layering secretions - bronchiectasis    Other:    Discussion / summary of stay / plan of care/ Recommendations:     HENCE:   Fever bandemia - BC and UA neg -CT AP 2/6 no bilat LL  pneumonia  - CXR neg 2/6  Post zosyn 2/1 - 2/4 and made no difference  Has NGT - planned for Peg maybe  CT head / sinuses - no  sinusitis 2/7 2/7 Periph iv is 5 day old - asking to remove it  CT chest again 2/8 no  pneumonia  - secretions  in the main bronchi bilat --  Watch closely for any post obst pneumonia that will require then AB  CXR 2/10 no pneumonia   Otherwise this might just be a central fever and bandemia      Infection Control Recommendations   [place] Precautions  Contact Isolation       Antimicrobial Stewardship Recommendations   Simplification of therapy  Targeted therapy  History of Present Illness:   Initial history:  Leland Eduardo II is a 64 y.o.-year-old male w CAD COPD and CHF  New onset R side weakness and confusion, EMS found him w higBP, intubated sedated.  CT large left basal ganglia and thalamus bleed w large mass effect  Extubated 1/30 and developed fever and bandemia, still not following commands, aphasic  Peg  2/7 planned  - for now still has NGT  Hypernatremia  due to hypertonic    Given zosyn but bandemia persistent   ID called for opinion and rule  out infection    On off LGF since admission  WBC  21 - 14 - 12 - 16 - 18  BC  neg 1/31 -2/2  UA neg  2/5  CT chest AP 
          [facility] -   Infectious diseases evaluation  admission date 1/28/2025    reason for consultation:   Fever bandemia persistent    Impression :   Current:  large left basal ganglia and thalamus bleed w large mass effect   R SIDE PARALYSIS AND global aphasia  1/28/24 Post craniotomy and hematoma evacuation  and dura graft  Surg site clean  Bandemia  Low grade fever  Hypernatremia - iatrogenic  Resp failure - extubated 1/30  Dysphagia - failed swall study - NGT  2/8/25 CT chest - bilat tracheal layering secretions - bronchiectasis    Other:    Discussion / summary of stay / plan of care/ Recommendations:     HENCE:   Fever bandemia - BC and UA neg -CT AP 2/6 no bilat LL  pneumonia  - CXR neg 2/6  Post zosyn 2/1 - 2/4 and made no difference  Has NGT - planned for Peg maybe  CT head / sinuses - no  sinusitis 2/7 2/7 Periph iv is 5 day old - asking to remove it  CT chest again 2/8 no  pneumonia  - secretions  in the main bronchi bilat --  Watch closely for any post obst pneumonia that will require then AB  Otherwise this might just be a central fever and bandemia      Infection Control Recommendations   [place] Precautions  Contact Isolation       Antimicrobial Stewardship Recommendations   Simplification of therapy  Targeted therapy  History of Present Illness:   Initial history:  Leland Eduardo II is a 64 y.o.-year-old male w CAD COPD and CHF  New onset R side weakness and confusion, EMS found him w higBP, intubated sedated.  CT large left basal ganglia and thalamus bleed w large mass effect  Extubated 1/30 and developed fever and bandemia, still not following commands, aphasic  Peg  2/7 planned  - for now still has NGT  Hypernatremia  due to hypertonic    Given zosyn but bandemia persistent   ID called for opinion and rule  out infection    On off LGF since admission  WBC  21 - 14 - 12 - 16 - 18  BC  neg 1/31 -2/2  UA neg  2/5  CT chest AP - no pneumonia  
          [facility] -   Infectious diseases evaluation  admission date 1/28/2025    reason for consultation:   Fever bandemia persistent    Impression :   Current:  large left basal ganglia and thalamus bleed w large mass effect   R SIDE PARALYSIS AND global aphasia  1/28/24 Post craniotomy and hematoma evacuation  and dura graft  Surg site clean  Bandemia  Low grade fever  Hypernatremia - iatrogenic  Resp failure - extubated 1/30  Dysphagia - failed swall study - NGT  2/8/25 CT chest - bilat tracheal layering secretions - bronchiectasis    Other:    Discussion / summary of stay / plan of care/ Recommendations:     HENCE:   Fever bandemia - BC and UA neg -CT AP 2/6 no bilat LL  pneumonia  - CXR neg 2/6  Post zosyn 2/1 - 2/4 and made no difference  Has NGT - planned for Peg maybe  CT head / sinuses - no  sinusitis 2/7 2/7 Periph iv is 5 day old - asking to remove it  CT chest again 2/8 no  pneumonia  - secretions  in the main bronchi bilat --  Watch closely for any post obst pneumonia that will require then AB  Otherwise this might just be a central fever and bandemia      Infection Control Recommendations   [place] Precautions  Contact Isolation       Antimicrobial Stewardship Recommendations   Simplification of therapy  Targeted therapy  History of Present Illness:   Initial history:  Leland Eduardo II is a 64 y.o.-year-old male w CAD COPD and CHF  New onset R side weakness and confusion, EMS found him w higBP, intubated sedated.  CT large left basal ganglia and thalamus bleed w large mass effect  Extubated 1/30 and developed fever and bandemia, still not following commands, aphasic  Peg  2/7 planned  - for now still has NGT  Hypernatremia  due to hypertonic    Given zosyn but bandemia persistent   ID called for opinion and rule  out infection    On off LGF since admission  WBC  21 - 14 - 12 - 16 - 18  BC  neg 1/31 -2/2  UA neg  2/5  CT chest AP - no pneumonia  
          [facility] -   Infectious diseases evaluation  admission date 1/28/2025    reason for consultation:   Fever bandemia persistent    Impression :   Current:  large left basal ganglia and thalamus bleed w large mass effect   R SIDE PARALYSIS AND global aphasia  1/28/24 Post craniotomy and hematoma evacuation  and dura graft  Surg site clean  Bandemia  Low grade fever  Hypernatremia - iatrogenic  Resp failure - extubated 1/30  Dysphagia - failed swall study - NGT    Other:    Discussion / summary of stay / plan of care/ Recommendations:     HENCE:   Fever bandemia - BC and UA neg -CT AP 2/6 no bilat LL  pneumonia  - CXR neg 2/6  Iv only 4 days old  Post zosyn 2/1 - 2/4 and made no difference  Has NGT - planned for Peg 2/7  CT head / sinuses - no  sinusitis 2/7 2/7 Periph iv is 5 day old - asking to remove it  Otherwise this might just be a central fever and bandemia      Infection Control Recommendations   [place] Precautions  Contact Isolation       Antimicrobial Stewardship Recommendations   Simplification of therapy  Targeted therapy  History of Present Illness:   Initial history:  Leland Eduardo II is a 64 y.o.-year-old male w CAD COPD and CHF  New onset R side weakness and confusion, EMS found him w higBP, intubated sedated.  CT large left basal ganglia and thalamus bleed w large mass effect  Extubated 1/30 and developed fever and bandemia, still not following commands, aphasic  Peg  2/7 planned  - for now still has NGT  Hypernatremia  due to hypertonic    Given zosyn but bandemia persistent   ID called for opinion and rule  out infection    On off LGF since admission  WBC  21 - 14 - 12 - 16 - 18  BC  neg 1/31 -2/2  UA neg  2/5  CT chest AP - no pneumonia  -rather benign 2/6/25      Interval changes  2/7/2025   Patient Vitals for the past 8 hrs:   Temp Temp src Pulse Resp   02/07/25 1229 -- -- 94 (!) 31   02/07/25 1056 99.4 °F (37.4 °C) Axillary -- -- 
          [facility] -   Infectious diseases evaluation  admission date 1/28/2025    reason for consultation:   Fever bandemia persistent    Impression :   Current:  large left basal ganglia and thalamus bleed w large mass effect   R SIDE PARALYSIS AND global aphasia  1/28/24 Post craniotomy and hematoma evacuation  and dura graft  Surg site clean  Bandemia  Low grade fever  Hypernatremia - iatrogenic  Resp failure - extubated 1/30  Dysphagia - failed swall study - NGT  2/8/25 CT chest - bilat tracheal layering secretions - bronchiectasis    Other:    Discussion / summary of stay / plan of care/ Recommendations:     HENCE:   Fever bandemia - BC and UA neg -CT AP 2/6 no bilat LL  pneumonia  - CXR neg 2/6  Post zosyn 2/1 - 2/4 and made no difference  Has NGT - planned for Peg maybe  CT head / sinuses - no  sinusitis 2/7 2/7 Periph iv is 5 day old - asking to remove it  CT chest again 2/8 no  pneumonia  - secretions  in the main bronchi bilat --  Watch closely for any post obst pneumonia that will require then AB  CXR 2/10 no pneumonia   Otherwise this might just be a central fever and bandemia      Infection Control Recommendations   [place] Precautions  Contact Isolation       Antimicrobial Stewardship Recommendations   Simplification of therapy  Targeted therapy  History of Present Illness:   Initial history:  Leland Eduardo II is a 64 y.o.-year-old male w CAD COPD and CHF  New onset R side weakness and confusion, EMS found him w higBP, intubated sedated.  CT large left basal ganglia and thalamus bleed w large mass effect  Extubated 1/30 and developed fever and bandemia, still not following commands, aphasic  Peg  2/7 planned  - for now still has NGT  Hypernatremia  due to hypertonic    Given zosyn but bandemia persistent   ID called for opinion and rule  out infection    On off LGF since admission  WBC  21 - 14 - 12 - 16 - 18  BC  neg 1/31 -2/2  UA neg  2/5  CT chest AP 
          [facility] -   Infectious diseases evaluation  admission date 1/28/2025    reason for consultation:   Fever bandemia persistent    Impression :   Current:  large left basal ganglia and thalamus bleed w large mass effect   R SIDE PARALYSIS AND global aphasia  1/28/24 Post craniotomy and hematoma evacuation  and dura graft  Surg site clean  Bandemia  Low grade fever  Hypernatremia - iatrogenic  Resp failure - extubated 1/30  Dysphagia - failed swall study - NGT  2/8/25 CT chest - bilat tracheal layering secretions - bronchiectasis    Other:    Discussion / summary of stay / plan of care/ Recommendations:     HENCE:   Fever bandemia - BC and UA neg -CT AP 2/6 no bilat LL  pneumonia  - CXR neg 2/6  Post zosyn 2/1 - 2/4 and made no difference  Has NGT - planned for Peg maybe  CT head / sinuses - no  sinusitis 2/7 2/7 Periph iv is 5 day old - asking to remove it  CT chest again 2/8 no  pneumonia  - secretions  in the main bronchi bilat --  Watch closely for any post obst pneumonia that will require then AB  CXR 2/10 no pneumonia     Otherwise recent  fever  might have  been a central fever and bandemia    Off antibiotics   Infection Control Recommendations   [place] Precautions  Contact Isolation       Antimicrobial Stewardship Recommendations   Simplification of therapy  Targeted therapy  History of Present Illness:   Initial history:  Leland Eduardo II is a 64 y.o.-year-old male w CAD COPD and CHF  New onset R side weakness and confusion, EMS found him w higBP, intubated sedated.  CT large left basal ganglia and thalamus bleed w large mass effect  Extubated 1/30 and developed fever and bandemia, still not following commands, aphasic  Peg  2/7 planned  - for now still has NGT  Hypernatremia  due to hypertonic    Given zosyn but bandemia persistent   ID called for opinion and rule  out infection    On off LGF since admission  WBC  21 - 14 - 12 - 16 - 18  BC  neg 1/31 
    Endovascular Neurosurgery Progress Note    Pt Name: Leland Eduardo II  MRN: [id number]  YOB: 1960  Date of evaluation: 2/6/2025  Primary Care Physician: No primary care provider on file.  Patient evaluated at the request of  Tashi Gallego MD    Reason for evaluation: abnormal vessels surrounding IPH of unclear etiology     SUBJECTIVE:     NAEO from EVN perspective. No changes in neuro exam.   History of Chief Complaint:    The patient is a 64-year-old male with a medical history of hypertension, hyperlipidemia, coronary artery disease (status post stent), congestive heart failure, COPD, and thrombocytopenia, who presented to [facility] ED on 1/28/2025 via [facility] for new onset right-sided weakness and decreased alertness. His last known well time was around 1415 on 1/28, when his wife found him slumped over to his right. Upon EMS arrival, his blood pressure was hypertensive, with a systolic reading of 194. By the time [facility] intervened, the patient was unable to manage his secretions and was intubated for airway protection. He was sedated with propofol and also received fentanyl and Versed during transport. Upon arrival at the ED, the patient remained intubated and on propofol, which was temporarily held. His initial systolic blood pressure was greater than 200.  A CT head without contrast revealed a large acute left basal ganglia/thalamus intraparenchymal hemorrhage extending into the left frontal lobe, with associated vasogenic edema and a mass effect (1.1 cm midline shift), along with intraventricular extension into the lateral, third, and fourth ventricles, causing mild dilation of the left lateral ventricle more than the right. A CTA head/neck with contrast showed 90% stenosis at the origin of the left internal carotid artery (ICA), with vessels at the superior and inferior aspects of the known parenchymal hematoma, related to venous structures, and associated contrast blushing 
    Endovascular Neurosurgery Progress Note    Pt Name: Leland Eduardo II  MRN: [id number]  YOB: 1960  Date of evaluation: 2/7/2025  Primary Care Physician: No primary care provider on file.  Patient evaluated at the request of  Tashi Gallego MD    Reason for evaluation: abnormal vessels surrounding IPH of unclear etiology     SUBJECTIVE:     NAEO from EVN perspective. No changes in neuro exam. Worsening Leukocytosis.   History of Chief Complaint:    The patient is a 64-year-old male with a medical history of hypertension, hyperlipidemia, coronary artery disease (status post stent), congestive heart failure, COPD, and thrombocytopenia, who presented to [facility] ED on 1/28/2025 via [facility] for new onset right-sided weakness and decreased alertness. His last known well time was around 1415 on 1/28, when his wife found him slumped over to his right. Upon EMS arrival, his blood pressure was hypertensive, with a systolic reading of 194. By the time [facility] intervened, the patient was unable to manage his secretions and was intubated for airway protection. He was sedated with propofol and also received fentanyl and Versed during transport. Upon arrival at the ED, the patient remained intubated and on propofol, which was temporarily held. His initial systolic blood pressure was greater than 200.  A CT head without contrast revealed a large acute left basal ganglia/thalamus intraparenchymal hemorrhage extending into the left frontal lobe, with associated vasogenic edema and a mass effect (1.1 cm midline shift), along with intraventricular extension into the lateral, third, and fourth ventricles, causing mild dilation of the left lateral ventricle more than the right. A CTA head/neck with contrast showed 90% stenosis at the origin of the left internal carotid artery (ICA), with vessels at the superior and inferior aspects of the known parenchymal hematoma, related to venous structures, and 
    Endovascular Neurosurgery Progress Note    Pt Name: Leland Eduardo II  MRN: [id number]  YOB: 1960  Date of evaluation: 2/15/2025  Primary Care Physician: No primary care provider on file.  Patient evaluated at the request of  Tashi Gallego MD    Reason for evaluation: abnormal vessels surrounding IPH of unclear etiology     SUBJECTIVE:     NAEO from EVN perspective. Decreased MAP. Given bolus.       History of Chief Complaint:    The patient is a 64-year-old male with a medical history of hypertension, hyperlipidemia, coronary artery disease (status post stent), congestive heart failure, COPD, and thrombocytopenia, who presented to [facility] ED on 1/28/2025 via [facility] for new onset right-sided weakness and decreased alertness. His last known well time was around 1415 on 1/28, when his wife found him slumped over to his right. Upon EMS arrival, his blood pressure was hypertensive, with a systolic reading of 194. By the time [facility] intervened, the patient was unable to manage his secretions and was intubated for airway protection. He was sedated with propofol and also received fentanyl and Versed during transport. Upon arrival at the ED, the patient remained intubated and on propofol, which was temporarily held. His initial systolic blood pressure was greater than 200.  A CT head without contrast revealed a large acute left basal ganglia/thalamus intraparenchymal hemorrhage extending into the left frontal lobe, with associated vasogenic edema and a mass effect (1.1 cm midline shift), along with intraventricular extension into the lateral, third, and fourth ventricles, causing mild dilation of the left lateral ventricle more than the right. A CTA head/neck with contrast showed 90% stenosis at the origin of the left internal carotid artery (ICA), with vessels at the superior and inferior aspects of the known parenchymal hematoma, related to venous structures, and associated contrast 
    Endovascular Neurosurgery Progress Note    Pt Name: Leland Eduardo II  MRN: [id number]  YOB: 1960  Date of evaluation: 2/2/2025  Primary Care Physician: No primary care provider on file.  Patient evaluated at the request of  Tashi Gallego MD    Reason for evaluation: abnormal vessels surrounding IPH of unclear etiology     SUBJECTIVE:     NAEO from EVN perspective. NGT in place. Appears uncomfortable, but unable to communicate d/t aphasia. R-weakness continues .    History of Chief Complaint:    The patient is a 64-year-old male with a medical history of hypertension, hyperlipidemia, coronary artery disease (status post stent), congestive heart failure, COPD, and thrombocytopenia, who presented to [facility] ED on 1/28/2025 via [facility] for new onset right-sided weakness and decreased alertness. His last known well time was around 1415 on 1/28, when his wife found him slumped over to his right. Upon EMS arrival, his blood pressure was hypertensive, with a systolic reading of 194. By the time [facility] intervened, the patient was unable to manage his secretions and was intubated for airway protection. He was sedated with propofol and also received fentanyl and Versed during transport. Upon arrival at the ED, the patient remained intubated and on propofol, which was temporarily held. His initial systolic blood pressure was greater than 200.  A CT head without contrast revealed a large acute left basal ganglia/thalamus intraparenchymal hemorrhage extending into the left frontal lobe, with associated vasogenic edema and a mass effect (1.1 cm midline shift), along with intraventricular extension into the lateral, third, and fourth ventricles, causing mild dilation of the left lateral ventricle more than the right. A CTA head/neck with contrast showed 90% stenosis at the origin of the left internal carotid artery (ICA), with vessels at the superior and inferior aspects of the known 
    Endovascular Neurosurgery Progress Note    Pt Name: Leland Eduardo II  MRN: [id number]  YOB: 1960  Date of evaluation: 2/3/2025  Primary Care Physician: No primary care provider on file.  Patient evaluated at the request of  Tashi Gallego MD    Reason for evaluation: abnormal vessels surrounding IPH of unclear etiology     SUBJECTIVE:     NAEO from EVN perspective. No changes in neuro exam. Transferred to step down.  History of Chief Complaint:    The patient is a 64-year-old male with a medical history of hypertension, hyperlipidemia, coronary artery disease (status post stent), congestive heart failure, COPD, and thrombocytopenia, who presented to [facility] ED on 1/28/2025 via [facility] for new onset right-sided weakness and decreased alertness. His last known well time was around 1415 on 1/28, when his wife found him slumped over to his right. Upon EMS arrival, his blood pressure was hypertensive, with a systolic reading of 194. By the time [facility] intervened, the patient was unable to manage his secretions and was intubated for airway protection. He was sedated with propofol and also received fentanyl and Versed during transport. Upon arrival at the ED, the patient remained intubated and on propofol, which was temporarily held. His initial systolic blood pressure was greater than 200.  A CT head without contrast revealed a large acute left basal ganglia/thalamus intraparenchymal hemorrhage extending into the left frontal lobe, with associated vasogenic edema and a mass effect (1.1 cm midline shift), along with intraventricular extension into the lateral, third, and fourth ventricles, causing mild dilation of the left lateral ventricle more than the right. A CTA head/neck with contrast showed 90% stenosis at the origin of the left internal carotid artery (ICA), with vessels at the superior and inferior aspects of the known parenchymal hematoma, related to venous structures, and 
    Endovascular Neurosurgery Progress Note    Pt Name: Leland Eduardo II  MRN: [id number]  YOB: 1960  Date of evaluation: 2/10/2025  Primary Care Physician: No primary care provider on file.  Patient evaluated at the request of  Tashi Gallego MD    Reason for evaluation: abnormal vessels surrounding IPH of unclear etiology     SUBJECTIVE:     NAEO from EVN perspective. Continues to have fluctuating mentation, sleepier this AM. CT head 2/8/25 appears stable. No new focal neuro deficits.      History of Chief Complaint:    The patient is a 64-year-old male with a medical history of hypertension, hyperlipidemia, coronary artery disease (status post stent), congestive heart failure, COPD, and thrombocytopenia, who presented to [facility] ED on 1/28/2025 via [facility] for new onset right-sided weakness and decreased alertness. His last known well time was around 1415 on 1/28, when his wife found him slumped over to his right. Upon EMS arrival, his blood pressure was hypertensive, with a systolic reading of 194. By the time [facility] intervened, the patient was unable to manage his secretions and was intubated for airway protection. He was sedated with propofol and also received fentanyl and Versed during transport. Upon arrival at the ED, the patient remained intubated and on propofol, which was temporarily held. His initial systolic blood pressure was greater than 200.  A CT head without contrast revealed a large acute left basal ganglia/thalamus intraparenchymal hemorrhage extending into the left frontal lobe, with associated vasogenic edema and a mass effect (1.1 cm midline shift), along with intraventricular extension into the lateral, third, and fourth ventricles, causing mild dilation of the left lateral ventricle more than the right. A CTA head/neck with contrast showed 90% stenosis at the origin of the left internal carotid artery (ICA), with vessels at the superior and inferior aspects 
    Endovascular Neurosurgery Progress Note    Pt Name: Leland Eduardo II  MRN: [id number]  YOB: 1960  Date of evaluation: 2/2/2025  Primary Care Physician: No primary care provider on file.  Patient evaluated at the request of  Tashi Gallego MD    Reason for evaluation: abnormal vessels surrounding IPH of unclear etiology     SUBJECTIVE:     NAEO from EVN perspective. No changes in exam. NGT in place, mittens on. No new focal neuro deficits.     History of Chief Complaint:    The patient is a 64-year-old male with a medical history of hypertension, hyperlipidemia, coronary artery disease (status post stent), congestive heart failure, COPD, and thrombocytopenia, who presented to [facility] ED on 1/28/2025 via [facility] for new onset right-sided weakness and decreased alertness. His last known well time was around 1415 on 1/28, when his wife found him slumped over to his right. Upon EMS arrival, his blood pressure was hypertensive, with a systolic reading of 194. By the time [facility] intervened, the patient was unable to manage his secretions and was intubated for airway protection. He was sedated with propofol and also received fentanyl and Versed during transport. Upon arrival at the ED, the patient remained intubated and on propofol, which was temporarily held. His initial systolic blood pressure was greater than 200.  A CT head without contrast revealed a large acute left basal ganglia/thalamus intraparenchymal hemorrhage extending into the left frontal lobe, with associated vasogenic edema and a mass effect (1.1 cm midline shift), along with intraventricular extension into the lateral, third, and fourth ventricles, causing mild dilation of the left lateral ventricle more than the right. A CTA head/neck with contrast showed 90% stenosis at the origin of the left internal carotid artery (ICA), with vessels at the superior and inferior aspects of the known parenchymal hematoma, related to 
    Endovascular Neurosurgery Progress Note    Pt Name: Leland Eduardo II  MRN: [id number]  YOB: 1960  Date of evaluation: 1/31/2025  Primary Care Physician: No primary care provider on file.  Patient evaluated at the request of  Tashi Gallego MD    Reason for evaluation: abnormal vessels surrounding IPH of unclear etiology     SUBJECTIVE:     NAEO from EVN perspective. Extubated. Exam with several deficits. Nsgy on board. Febrile.    History of Chief Complaint:    The patient is a 64-year-old male with a medical history of hypertension, hyperlipidemia, coronary artery disease (status post stent), congestive heart failure, COPD, and thrombocytopenia, who presented to [facility] ED on 1/28/2025 via [facility] for new onset right-sided weakness and decreased alertness. His last known well time was around 1415 on 1/28, when his wife found him slumped over to his right. Upon EMS arrival, his blood pressure was hypertensive, with a systolic reading of 194. By the time [facility] intervened, the patient was unable to manage his secretions and was intubated for airway protection. He was sedated with propofol and also received fentanyl and Versed during transport. Upon arrival at the ED, the patient remained intubated and on propofol, which was temporarily held. His initial systolic blood pressure was greater than 200.  A CT head without contrast revealed a large acute left basal ganglia/thalamus intraparenchymal hemorrhage extending into the left frontal lobe, with associated vasogenic edema and a mass effect (1.1 cm midline shift), along with intraventricular extension into the lateral, third, and fourth ventricles, causing mild dilation of the left lateral ventricle more than the right. A CTA head/neck with contrast showed 90% stenosis at the origin of the left internal carotid artery (ICA), with vessels at the superior and inferior aspects of the known parenchymal hematoma, related to venous 
    Endovascular Neurosurgery Progress Note    Pt Name: Leland Eduardo II  MRN: [id number]  YOB: 1960  Date of evaluation: 2/18/2025  Primary Care Physician: No primary care provider on file.  Patient evaluated at the request of  Tashi Gallego MD    Reason for evaluation: abnormal vessels surrounding IPH of unclear etiology     SUBJECTIVE:     NAEO from EVN perspective.       History of Chief Complaint:    The patient is a 64-year-old male with a medical history of hypertension, hyperlipidemia, coronary artery disease (status post stent), congestive heart failure, COPD, and thrombocytopenia, who presented to [facility] ED on 1/28/2025 via [facility] for new onset right-sided weakness and decreased alertness. His last known well time was around 1415 on 1/28, when his wife found him slumped over to his right. Upon EMS arrival, his blood pressure was hypertensive, with a systolic reading of 194. By the time [facility] intervened, the patient was unable to manage his secretions and was intubated for airway protection. He was sedated with propofol and also received fentanyl and Versed during transport. Upon arrival at the ED, the patient remained intubated and on propofol, which was temporarily held. His initial systolic blood pressure was greater than 200.  A CT head without contrast revealed a large acute left basal ganglia/thalamus intraparenchymal hemorrhage extending into the left frontal lobe, with associated vasogenic edema and a mass effect (1.1 cm midline shift), along with intraventricular extension into the lateral, third, and fourth ventricles, causing mild dilation of the left lateral ventricle more than the right. A CTA head/neck with contrast showed 90% stenosis at the origin of the left internal carotid artery (ICA), with vessels at the superior and inferior aspects of the known parenchymal hematoma, related to venous structures, and associated contrast blushing up to 3 mm.  The 
    Endovascular Neurosurgery Progress Note    Pt Name: Leland Eduardo II  MRN: [id number]  YOB: 1960  Date of evaluation: 2/5/2025  Primary Care Physician: No primary care provider on file.  Patient evaluated at the request of  Tashi Gallego MD    Reason for evaluation: abnormal vessels surrounding IPH of unclear etiology     SUBJECTIVE:     NAEO from EVN perspective. No changes in neuro exam.   History of Chief Complaint:    The patient is a 64-year-old male with a medical history of hypertension, hyperlipidemia, coronary artery disease (status post stent), congestive heart failure, COPD, and thrombocytopenia, who presented to [facility] ED on 1/28/2025 via [facility] for new onset right-sided weakness and decreased alertness. His last known well time was around 1415 on 1/28, when his wife found him slumped over to his right. Upon EMS arrival, his blood pressure was hypertensive, with a systolic reading of 194. By the time [facility] intervened, the patient was unable to manage his secretions and was intubated for airway protection. He was sedated with propofol and also received fentanyl and Versed during transport. Upon arrival at the ED, the patient remained intubated and on propofol, which was temporarily held. His initial systolic blood pressure was greater than 200.  A CT head without contrast revealed a large acute left basal ganglia/thalamus intraparenchymal hemorrhage extending into the left frontal lobe, with associated vasogenic edema and a mass effect (1.1 cm midline shift), along with intraventricular extension into the lateral, third, and fourth ventricles, causing mild dilation of the left lateral ventricle more than the right. A CTA head/neck with contrast showed 90% stenosis at the origin of the left internal carotid artery (ICA), with vessels at the superior and inferior aspects of the known parenchymal hematoma, related to venous structures, and associated contrast blushing 
    Endovascular Neurosurgery Progress Note    Pt Name: Leland Eduardo II  MRN: [id number]  YOB: 1960  Date of evaluation: 2/8/2025  Primary Care Physician: No primary care provider on file.  Patient evaluated at the request of  Tashi Gallego MD    Reason for evaluation: abnormal vessels surrounding IPH of unclear etiology     SUBJECTIVE:     NAEO from EVN perspective. No changes in neuro exam. Worsening Leukocytosis.   History of Chief Complaint:    The patient is a 64-year-old male with a medical history of hypertension, hyperlipidemia, coronary artery disease (status post stent), congestive heart failure, COPD, and thrombocytopenia, who presented to [facility] ED on 1/28/2025 via [facility] for new onset right-sided weakness and decreased alertness. His last known well time was around 1415 on 1/28, when his wife found him slumped over to his right. Upon EMS arrival, his blood pressure was hypertensive, with a systolic reading of 194. By the time [facility] intervened, the patient was unable to manage his secretions and was intubated for airway protection. He was sedated with propofol and also received fentanyl and Versed during transport. Upon arrival at the ED, the patient remained intubated and on propofol, which was temporarily held. His initial systolic blood pressure was greater than 200.  A CT head without contrast revealed a large acute left basal ganglia/thalamus intraparenchymal hemorrhage extending into the left frontal lobe, with associated vasogenic edema and a mass effect (1.1 cm midline shift), along with intraventricular extension into the lateral, third, and fourth ventricles, causing mild dilation of the left lateral ventricle more than the right. A CTA head/neck with contrast showed 90% stenosis at the origin of the left internal carotid artery (ICA), with vessels at the superior and inferior aspects of the known parenchymal hematoma, related to venous structures, and 
    Endovascular Neurosurgery Progress Note    Pt Name: Leland Eduardo II  MRN: [id number]  YOB: 1960  Date of evaluation: 2/16/2025  Primary Care Physician: No primary care provider on file.  Patient evaluated at the request of  Tashi Gallego MD    Reason for evaluation: abnormal vessels surrounding IPH of unclear etiology     SUBJECTIVE:     NAEO from EVN perspective.       History of Chief Complaint:    The patient is a 64-year-old male with a medical history of hypertension, hyperlipidemia, coronary artery disease (status post stent), congestive heart failure, COPD, and thrombocytopenia, who presented to [facility] ED on 1/28/2025 via [facility] for new onset right-sided weakness and decreased alertness. His last known well time was around 1415 on 1/28, when his wife found him slumped over to his right. Upon EMS arrival, his blood pressure was hypertensive, with a systolic reading of 194. By the time [facility] intervened, the patient was unable to manage his secretions and was intubated for airway protection. He was sedated with propofol and also received fentanyl and Versed during transport. Upon arrival at the ED, the patient remained intubated and on propofol, which was temporarily held. His initial systolic blood pressure was greater than 200.  A CT head without contrast revealed a large acute left basal ganglia/thalamus intraparenchymal hemorrhage extending into the left frontal lobe, with associated vasogenic edema and a mass effect (1.1 cm midline shift), along with intraventricular extension into the lateral, third, and fourth ventricles, causing mild dilation of the left lateral ventricle more than the right. A CTA head/neck with contrast showed 90% stenosis at the origin of the left internal carotid artery (ICA), with vessels at the superior and inferior aspects of the known parenchymal hematoma, related to venous structures, and associated contrast blushing up to 3 mm.  The 
    Endovascular Neurosurgery Progress Note    Pt Name: Leland Eduardo II  MRN: [id number]  YOB: 1960  Date of evaluation: 2/13/2025  Primary Care Physician: No primary care provider on file.  Patient evaluated at the request of  Tashi Gallego MD    Reason for evaluation: abnormal vessels surrounding IPH of unclear etiology     SUBJECTIVE:     NAEO from EVN perspective. Nephrology on board. GI signed off. PT/OT on board. Discharge planning in progress.      History of Chief Complaint:    The patient is a 64-year-old male with a medical history of hypertension, hyperlipidemia, coronary artery disease (status post stent), congestive heart failure, COPD, and thrombocytopenia, who presented to [facility] ED on 1/28/2025 via [facility] for new onset right-sided weakness and decreased alertness. His last known well time was around 1415 on 1/28, when his wife found him slumped over to his right. Upon EMS arrival, his blood pressure was hypertensive, with a systolic reading of 194. By the time [facility] intervened, the patient was unable to manage his secretions and was intubated for airway protection. He was sedated with propofol and also received fentanyl and Versed during transport. Upon arrival at the ED, the patient remained intubated and on propofol, which was temporarily held. His initial systolic blood pressure was greater than 200.  A CT head without contrast revealed a large acute left basal ganglia/thalamus intraparenchymal hemorrhage extending into the left frontal lobe, with associated vasogenic edema and a mass effect (1.1 cm midline shift), along with intraventricular extension into the lateral, third, and fourth ventricles, causing mild dilation of the left lateral ventricle more than the right. A CTA head/neck with contrast showed 90% stenosis at the origin of the left internal carotid artery (ICA), with vessels at the superior and inferior aspects of the known parenchymal hematoma, 
    Endovascular Neurosurgery Progress Note    Pt Name: Leland Eduardo II  MRN: [id number]  YOB: 1960  Date of evaluation: 2/14/2025  Primary Care Physician: No primary care provider on file.  Patient evaluated at the request of  Tashi Gallego MD    Reason for evaluation: abnormal vessels surrounding IPH of unclear etiology     SUBJECTIVE:     NAEO from EVN perspective. No major changes since yesterday. Pending SNF placement.      History of Chief Complaint:    The patient is a 64-year-old male with a medical history of hypertension, hyperlipidemia, coronary artery disease (status post stent), congestive heart failure, COPD, and thrombocytopenia, who presented to [facility] ED on 1/28/2025 via [facility] for new onset right-sided weakness and decreased alertness. His last known well time was around 1415 on 1/28, when his wife found him slumped over to his right. Upon EMS arrival, his blood pressure was hypertensive, with a systolic reading of 194. By the time [facility] intervened, the patient was unable to manage his secretions and was intubated for airway protection. He was sedated with propofol and also received fentanyl and Versed during transport. Upon arrival at the ED, the patient remained intubated and on propofol, which was temporarily held. His initial systolic blood pressure was greater than 200.  A CT head without contrast revealed a large acute left basal ganglia/thalamus intraparenchymal hemorrhage extending into the left frontal lobe, with associated vasogenic edema and a mass effect (1.1 cm midline shift), along with intraventricular extension into the lateral, third, and fourth ventricles, causing mild dilation of the left lateral ventricle more than the right. A CTA head/neck with contrast showed 90% stenosis at the origin of the left internal carotid artery (ICA), with vessels at the superior and inferior aspects of the known parenchymal hematoma, related to venous 
    Endovascular Neurosurgery Progress Note    Pt Name: Leland Eduardo II  MRN: [id number]  YOB: 1960  Date of evaluation: 2/17/2025  Primary Care Physician: No primary care provider on file.  Patient evaluated at the request of  Tashi Gallego MD    Reason for evaluation: abnormal vessels surrounding IPH of unclear etiology     SUBJECTIVE:     NAEO from EVN perspective.       History of Chief Complaint:    The patient is a 64-year-old male with a medical history of hypertension, hyperlipidemia, coronary artery disease (status post stent), congestive heart failure, COPD, and thrombocytopenia, who presented to [facility] ED on 1/28/2025 via [facility] for new onset right-sided weakness and decreased alertness. His last known well time was around 1415 on 1/28, when his wife found him slumped over to his right. Upon EMS arrival, his blood pressure was hypertensive, with a systolic reading of 194. By the time [facility] intervened, the patient was unable to manage his secretions and was intubated for airway protection. He was sedated with propofol and also received fentanyl and Versed during transport. Upon arrival at the ED, the patient remained intubated and on propofol, which was temporarily held. His initial systolic blood pressure was greater than 200.  A CT head without contrast revealed a large acute left basal ganglia/thalamus intraparenchymal hemorrhage extending into the left frontal lobe, with associated vasogenic edema and a mass effect (1.1 cm midline shift), along with intraventricular extension into the lateral, third, and fourth ventricles, causing mild dilation of the left lateral ventricle more than the right. A CTA head/neck with contrast showed 90% stenosis at the origin of the left internal carotid artery (ICA), with vessels at the superior and inferior aspects of the known parenchymal hematoma, related to venous structures, and associated contrast blushing up to 3 mm.  The 
    [facility]   Gastroenterology Progress Note    Leland Eduardo II is a 64 y.o. male patient.  Hospitalization Day:15      Chief consult reason:   PEG tube placement    Subjective:  Patient seen and examined.  PEG site clean dry and intact, bumper slightly snug, adjusted, now moves freely.  No bleeding drainage or odor noted.  Bumper on in place for diversion  No pain with assessment/facial grimacing  Patient tolerating tube feed at 10 mL/h      VITALS:  /86   Pulse 90   Temp 98.4 °F (36.9 °C) (Oral)   Resp 18   Ht 1.88 m (6' 2\")   Wt 55.2 kg (121 lb 11.1 oz)   SpO2 100%   BMI 15.62 kg/m²   TEMPERATURE:  Current - Temp: 98.4 °F (36.9 °C); Max - Temp  Av.2 °F (36.8 °C)  Min: 98 °F (36.7 °C)  Max: 98.4 °F (36.9 °C)    Physical Assessment:  General appearance:   sleeping  Mental Status: Name only  Lungs:  clear to auscultation bilaterally, normal effort  Heart:  regular rate and rhythm, no murmur  Abdomen:  soft, nontender, nondistended, normal bowel sounds, no masses, hepatomegaly, splenomegaly  Extremities:  no edema, redness, tenderness in the calves  Skin:  no gross lesions, rashes, induration    Data Review:    Labs and Imaging:       CBC:  Recent Labs     02/10/25  0553 25  1504   WBC 23.2* 14.2*   HGB 16.6 15.9   MCV 98.0 101.9   RDW 12.1 12.1    266       ANEMIA STUDIES:  No results for input(s): \"TIBC\", \"FERRITIN\", \"QMLBYKDF01\", \"FOLATE\", \"OCCULTBLD\" in the last 72 hours.    Invalid input(s): \"LABIRON\"    BMP:  Recent Labs     02/10/25  0553 02/10/25  1659 25  1504 25  2255 25  0651   *   < > 155* 152* 153*   K 3.7  --  4.1  --   --    *  --  119*  --   --    CO2 26  --  24  --   --    BUN 45*  --  43*  --   --    CREATININE 1.1  --  1.0  --   --    GLUCOSE 138*  --  104*  --   --    CALCIUM 10.1  --  10.0  --   --     < > = values in this interval not displayed.       LFTS:  No results for input(s): \"ALKPHOS\", \"ALT\", \"AST\", 
   02/16/25 0835   Care Plan - Respiratory Goals   Achieves optimal ventilation and oxygenation Assess for changes in respiratory status;Assess for changes in mentation and behavior;Position to facilitate oxygenation and minimize respiratory effort;Oxygen supplementation based on oxygen saturation or arterial blood gases;Encourage broncho-pulmonary hygiene including cough, deep breathe, incentive spirometry;Assess the need for suctioning and aspirate as needed;Assess and instruct to report shortness of breath or any respiratory difficulty;Respiratory therapy support as indicated       
   02/17/25 0820   Care Plan - Respiratory Goals   Achieves optimal ventilation and oxygenation Assess for changes in respiratory status;Assess for changes in mentation and behavior;Position to facilitate oxygenation and minimize respiratory effort;Oxygen supplementation based on oxygen saturation or arterial blood gases;Encourage broncho-pulmonary hygiene including cough, deep breathe, incentive spirometry;Assess the need for suctioning and aspirate as needed;Assess and instruct to report shortness of breath or any respiratory difficulty;Respiratory therapy support as indicated       
   Daily Progress Note  Neuro Critical Care    Patient Name: Leland Eduardo II  Patient : 1960  Room/Bed: 0119/0119-01  Code Status: Full Code  Allergies: No Known Allergies    CHIEF COMPLAINT:      JOSE, intubated     INTERVAL HISTORY      Initial Presentation (Admitted ):     Leland Eduardo II is a 65 yo male with history of HTN, HLD, CAD (s/p stent), CHF, COPD and thrombocytopenia who presented to [facility] ED on 2025 via [facility] for new onset right sided weakness, decreased level of alertness. Last known well  around 1415 when he was found by wife slumped over to his right. On EMS arrival, patient was found to be hypertensive . Per reports, by the time [facility] intercepted, patient was not managing his secretions and he was subsequently intubated for airway protection. Started on propofol for sedation. Also received fentanyl and Versed en route. On arrival to the ED, patient was intubated and on propofol. Propofol held. Initial SBP>200. CT head without contrast showed large acute left basal ganglia/thalamus intraparenchymal hemorrhage extending into the left frontal lobe with associated vasogenic edema and mass effect (1.1 cm midline shift), intraventricular extension into the lateral, third and fourth ventricles with mild dilation of the left greater than right lateral ventricles. CTA head/neck with contrast showed 90% stenosis at the origin of the left ICA, vessels at the superior and inferior aspects of the known parenchymal hematoma of the left basal ganglia/thalamus related to the venous structures with associated contrast blushing up to 3 mm. Started on Cardene infusion with SBP goal <160. Given 250cc 3% bolus. Labs revealed mild hyponatremia (131) but were otherwise unremarkable. Coags, platelet count normal. No known AC/AP. Hypothermic in the ED. Neurosurgery consulted. Neurocritical care consulted for admission. On exam in ED, patient is intubated with propofol 
   Daily Progress Note  Neuro Critical Care    Patient Name: Leland Rosario Alesha II  Patient : 1960  Room/Bed: 0119/0119-01  Code Status: FULL  Allergies: No Known Allergies    CHIEF COMPLAINT:      FULL     INTERVAL HISTORY    Initial Presentation (Admitted 25):  The patient is a 63 yo male with history of HTN, HLD, CAD (s/p stent), CHF, COPD and thrombocytopenia who presented to [facility] ED on 2025 via [facility] for new onset right sided weakness, decreased level of alertness.  Last known well  around 1415 when he was found by wife slumped over to his right.  On EMS arrival, patient was found to be hypertensive .  Per reports, by the time [facility] intercepted, patient was not managing his secretions and he was subsequently intubated for airway protection.  Started on propofol for sedation.  Also received fentanyl and Versed en route.  On arrival to the ED, patient was intubated and on propofol.  Propofol held.  Initial SBP>200.  CT head without contrast showed large acute left basal ganglia/thalamus intraparenchymal hemorrhage extending into the left frontal lobe with associated vasogenic edema and mass effect (1.1 cm midline shift), intraventricular extension into the lateral, third and fourth ventricles with mild dilation of the left greater than right lateral ventricles.  CTA head/neck with contrast showed 90% stenosis at the origin of the left ICA, vessels at the superior and inferior aspects of the known parenchymal hematoma of the left basal ganglia/thalamus related to the venous structures with associated contrast blushing up to 3 mm.  Started on Cardene infusion with SBP goal <160.  Given 250cc 3% bolus.  Labs revealed mild hyponatremia (131) but were otherwise unremarkable.  Coags, platelet count normal.  No known AC/AP.  Hypothermic in the ED.  Neurosurgery consulted.  Neurocritical care consulted for admission.  On exam in ED, patient is intubated with propofol held.  
   Daily Progress Note  Neuro Critical Care    Patient Name: Leland Rosario Alesha II  Patient : 1960  Room/Bed: 0119/0119-01  Code Status: Full code  Allergies: No Known Allergies    CHIEF COMPLAINT:      JOSE, intubated     INTERVAL HISTORY    Initial Presentation (Admitted ):  The patient is a 63 yo male with history of HTN, HLD, CAD (s/p stent), CHF, COPD and thrombocytopenia who presented to [facility] ED on 2025 via [facility] for new onset right sided weakness, decreased level of alertness. Last known well  around 1415 when he was found by wife slumped over to his right. On EMS arrival, patient was found to be hypertensive . Per reports, by the time [facility] intercepted, patient was not managing his secretions and he was subsequently intubated for airway protection. Started on propofol for sedation. Also received fentanyl and Versed en route. On arrival to the ED, patient was intubated and on propofol. Propofol held. Initial SBP>200. CT head without contrast showed large acute left basal ganglia/thalamus intraparenchymal hemorrhage extending into the left frontal lobe with associated vasogenic edema and mass effect (1.1 cm midline shift), intraventricular extension into the lateral, third and fourth ventricles with mild dilation of the left greater than right lateral ventricles. CTA head/neck with contrast showed 90% stenosis at the origin of the left ICA, vessels at the superior and inferior aspects of the known parenchymal hematoma of the left basal ganglia/thalamus related to the venous structures with associated contrast blushing up to 3 mm. Started on Cardene infusion with SBP goal <160. Given 250cc 3% bolus. Labs revealed mild hyponatremia (131) but were otherwise unremarkable. Coags, platelet count normal. No known AC/AP. Hypothermic in the ED. Neurosurgery consulted. Neurocritical care consulted for admission. On exam in ED, patient is intubated with propofol held. GCS E1, 
  [facility]  Occupational Therapy Not Seen Note    DATE: 2025    NAME: Leland Eduardo II  MRN: [id number]   : 1960      Patient not seen this date for Occupational Therapy due to:    Strict Bedrest:and intubated     Next Scheduled Treatment: check back 2025     Electronically signed by NO Rao on 2025 at 9:36 AM    
  [facility]  Occupational Therapy Not Seen Note    DATE: 2025    NAME: Leland Eduardo II  MRN: [id number]   : 1960      Patient not seen this date for Occupational Therapy due to:    Strict Bedrest:just extubated in PM. OT will check back tomorrow as appropriate.    Next Scheduled Treatment:       Electronically signed by NO Jack on 2025 at 1:40 PM      
.NEPHROLOGY PROGRESS NOTE      ASSESSMENT     Hypernatremia secondary to free water deficit due to impaired thirst from IPH.  Doubt  -D syndrome  Left hemispheric intraparenchymal hemorrhage status post craniectomy and hematoma evacuation etiology suspected hypertension  Essential hypertension  COPD    PLAN     BMP stat and check electrolytes Q6  Check urine osmolality to rule out -D  Blood pressure goals as per primary  Will follow      SUBJECTIVE   Presented when found altered mental sensorium/new onset right-sided weakness.  Initial assessment showed uncontrolled hypertension with neuroimaging suggestive of intraparenchymal hemorrhage with vasogenic edema and mass effect and midline shift.  CTA showed 90% stenosis of left ICA.  Hospital course further complicated by hypernatremia which prompted nephrology consultation.    Currently receiving free water flushes 400 mL every 6.  Urine output excellent daily 2 L.  Not in polyuria.  Calcium normal.  Urine osmolality results pending.  Renal function stable.    OBJECTIVE     Vitals:    02/13/25 0000 02/13/25 0330 02/13/25 0400 02/13/25 0713   BP: 110/79 100/73 107/73 112/74   Pulse: 86 88 91 76   Resp: 14 18 21 23   Temp: 98.2 °F (36.8 °C) 97.3 °F (36.3 °C)  97.9 °F (36.6 °C)   TempSrc: Axillary Axillary  Oral   SpO2: 100% 91% 100% 100%   Weight: 53.9 kg (118 lb 13.3 oz)      Height:         24HR INTAKE/OUTPUT:    Intake/Output Summary (Last 24 hours) at 2/13/2025 0817  Last data filed at 2/13/2025 0400  Gross per 24 hour   Intake 2030 ml   Output 0 ml   Net 2030 ml       General appearance: Altered mental sensorium  Respiratory::vesicular breath sounds,no wheeze/crackles  Cardiovascular:S1 S2 normal,no gallop or organic murmur.  Abdomen:Non tender/non distended.Bowel sounds present  Extremities: No Cyanosis or Clubbing,Lower extremity edema  Neurological: Altered mental sensorium      MEDICATIONS     Scheduled Meds:    albuterol  2.5 mg Nebulization 4x Daily RT 
[facility] Neurology   IN-PATIENT SERVICE   [facility]    Progress Note             Date:   2/3/2025  Patient name:  Leland Eduardo II  Date of admission:  1/28/2025  3:33 PM  MRN:   [id number]  Account:  [id number]  YOB: 1960  PCP:    No primary care provider on file.  Room:   [address] [place] [address]  Code Status:    Full Code    Chief Complaint:     Chief Complaint   Patient presents with    Cerebrovascular Accident       Interval hx:     The patient was seen and examined at bedside. Is vitally stable, alert continues to have difficulty with speech. No acute events overnight. On TF.    Continues to be hypertensive, lisinopril increased to 20 mg. Leukocytosis improving with antibiotics. Hypernatremia.      Brief History of Present Illness:     Leland Eduardo II is a 64-year-old male with a history of HTN, HLD, CAD (s/p stent), CHF, COPD, and thrombocytopenia who was admitted to the Neuro ICU on 1/28/2025 following new-onset right-sided weakness and decreased alertness. Last known well was at 1415 on 1/28, when he was found by his wife slumped over. EMS found him hypertensive () and he was intubated for airway protection, receiving propofol, fentanyl, and Versed during transport. Upon ED arrival, initial imaging showed a large left basal ganglia/thalamic hemorrhage with significant mass effect (1.1 cm midline shift), IVH, and a 90% stenosis at the left ICA. He was started on a Cardene infusion for BP control (goal SBP <160), and 250cc 3% saline bolus was administered. Neurosurgery performed a craniotomy with hematoma evacuation. Post-op, the patient remained intubated and sedated, with some improvement in imaging and clinical status. He was extubated on 1/30, but developed fever and leukocytosis. Empiric antibiotics (Zosyn) were started on 2/1 with immediate improvement in WBC count. By the morning of 2/2, the patient remained stable, with no acute events, but continues to have 
BRONCHOSPASM/BRONCHOCONSTRICTION     [x]         IMPROVE AERATION/BREATH SOUNDS  [x]   ADMINISTER BRONCHODILATOR THERAPY AS APPROPRIATE  [x]   ASSESS BREATH SOUNDS  [x]   IMPLEMENT AEROSOL/MDI PROTOCOL    
[facility] Neurology   IN-PATIENT SERVICE   [facility]    Progress Note             Date:   2/10/2025  Patient name:  Leland Eduardo II  Date of admission:  1/28/2025  3:33 PM  MRN:   [id number]  Account:  [id number]  YOB: 1960  PCP:    No primary care provider on file.  Room:   [address] [place] [address]  Code Status:    Full Code    Chief Complaint:     Chief Complaint   Patient presents with    Cerebrovascular Accident       Interval hx:     The patient was seen and examined at bedside.  Vitally stable,Afebrile, white blood cell keep trending up 23.2, ID on board likely bandemia with no source of infection.  Sodium 152, n.p.o. for PEG tube today, started on normal saline 75 mL/h, sign of dehydration with BUN increased to 45.  Neurological exam unchanged, after PE tube will work on the placement.    Brief History of Present Illness:     Leland Eduardo II is a 64-year-old male with a history of HTN, HLD, CAD (s/p stent), CHF, COPD, and thrombocytopenia who was admitted to the Neuro ICU on 1/28/2025 following new-onset right-sided weakness and decreased alertness. Last known well was at 1415 on 1/28, when he was found by his wife slumped over. EMS found him hypertensive () and he was intubated for airway protection, receiving propofol, fentanyl, and Versed during transport. Upon ED arrival, initial imaging showed a large left basal ganglia/thalamic hemorrhage with significant mass effect (1.1 cm midline shift), IVH, and a 90% stenosis at the left ICA. He was started on a Cardene infusion for BP control (goal SBP <160), and 250cc 3% saline bolus was administered. Neurosurgery performed a craniotomy with hematoma evacuation. Post-op, the patient remained intubated and sedated, with some improvement in imaging and clinical status. He was extubated on 1/30, but developed fever and leukocytosis. Empiric antibiotics (Zosyn) were started on 2/1 with immediate improvement in WBC count. By 
Comprehensive Nutrition Assessment    Type and Reason for Visit:  Initial (Ventilated)    Nutrition Recommendations/Plan:   Continue NPO as medically necessary  If TF desired, recommend Standard with Fiber (Jevity 1.5) with goal of 65 mL/hr  Monitor POC, wt, meds, labs     Malnutrition Assessment:  Malnutrition Status:  Insufficient data (01/29/25 9232)    Context:  Acute Illness     Findings of the 6 clinical characteristics of malnutrition:  Energy Intake:  Mild decrease in energy intake  Weight Loss:  Unable to assess     Body Fat Loss:  Moderate body fat loss Orbital   Muscle Mass Loss:  Unable to assess    Fluid Accumulation:  No fluid accumulation     Strength:  Not Performed    Nutrition Assessment:    Initial assessment for ventilated and intubated. Spoke with RN and no plans for extubation. Writer told RN will provide recommendations for TF. Recommend initiating Standard with Fiber (Jevity 1.5) @ 10 mL/hr and advance to goal of 65 mL/hr as tolerated or 10 mL q6h to provide 2340 kcals and 100 gm PRO/d. Water flushes per physician. Labs: K 3.3 mmol/L. Meds: potassium bicarb, cardene.    Nutrition Related Findings:    Meds/labs reviewed Wound Type: Surgical Incision       Current Nutrition Intake & Therapies:    Average Meal Intake: NPO  Average Supplements Intake: NPO  Diet NPO    Anthropometric Measures:  Height: 188 cm (6' 2\")  Ideal Body Weight (IBW): 190 lbs (86 kg)    Current Body Weight: 85 kg (187 lb 6.3 oz)  Current BMI (kg/m2): 24  Weight Adjustment For: No Adjustment  BMI Categories: Normal Weight (BMI 22.0 to 24.9) age over 65    Estimated Daily Nutrient Needs:  Energy Requirements Based On: Formula  Weight Used for Energy Requirements: Current  Energy (kcal/day): 1755-1174 kcals/d  Weight Used for Protein Requirements: Current  Protein (g/day): 100-150 gm/d  Method Used for Fluid Requirements: 1 ml/kcal  Fluid (ml/day): or per physician    Nutrition Diagnosis:   Inadequate oral intake related 
Comprehensive Nutrition Assessment    Type and Reason for Visit:  Reassess    Nutrition Recommendations/Plan:   Increase water flushes to 100 mL q6h d/t elevated sodium lab  Continue TF regimen as tolerated  Monitor labs, wt, meds, edema, POC     Malnutrition Assessment:  Malnutrition Status:  Severe malnutrition (02/04/25 1126)    Context:  Acute Illness     Findings of the 6 clinical characteristics of malnutrition:  Energy Intake:  Mild decrease in energy intake  Weight Loss:  Unable to assess     Body Fat Loss:  Moderate body fat loss Orbital, Triceps, Buccal region   Muscle Mass Loss:  Moderate muscle mass loss Temples (temporalis), Thigh (quadriceps), Calf (gastrocnemius), Hand (interosseous)  Fluid Accumulation:  Mild Extremities   Strength:  Not Performed    Nutrition Assessment:    Tolerating current TF @ goal of 65 mL/hr. Spoke with RN if there are plans for PEG placement and there are plans for discussion today. Pt had failed BSSE on 1/31 and MBSS on 2/3. +1 non-pitting RUE edema.    Nutrition Related Findings:    Meds/labs reviewed Wound Type: Multiple, Surgical Incision       Current Nutrition Intake & Therapies:    Average Meal Intake: NPO  Average Supplements Intake: NPO  Diet NPO  ADULT TUBE FEEDING; Nasogastric; Standard with Fiber; Continuous; 10; Yes; 10; Q 6 hours; 65; 30; Q 4 hours  Current Tube Feeding (TF) Orders:  Feeding Route: Nasogastric  Formula: Standard with Fiber  Schedule: Continuous  Feeding Regimen: 65 mL/hr  Additives/Modulars: None  Water Flushes: 30 mL q4h  Current TF Provides: 2340 kcals, 99 gm PRO, 1366 mL water    Anthropometric Measures:  Height: 188 cm (6' 2\")  Ideal Body Weight (IBW): 190 lbs (86 kg)    Admission Body Weight: 84.8 kg (187 lb) (Estimated)  Current Body Weight: 54.9 kg (121 lb)  Current BMI (kg/m2): 15.5  Weight Adjustment For: No Adjustment  BMI Categories: Underweight (BMI less than 22) age over 65    Estimated Daily Nutrient Needs:  Energy Requirements 
Dr. Sangeeta Lockhart notified about patient's vitals. Multiple orders placed.  Vitals:    02/15/25 0954   BP: (!) 76/51   Pulse: 88   Resp: 25   Temp:    SpO2: 100%      
Extubated pt to 3L nc, pt had a cuff leak, no stridor indicated post extubation, pt tolerating well, sating 100%  
Facility/Department: [address] NEURO ICU   CLINICAL BEDSIDE SWALLOW EVALUATION    NAME: Leland Eduardo II  : 1960  MRN: [id number]    ADMISSION DATE: 2025  ADMITTING DIAGNOSIS: has Intracranial hemorrhage (HCC); Hemorrhagic stroke (HCC); Hypertensive emergency; Cerebral edema (HCC); and Acute respiratory failure with hypoxia on their problem list.      Date of Eval: 2025  Evaluating Therapist: KARIN Polanco    Current Diet level:  NPO with NG       Primary Complaint: The patient is a 63 yo male with history of HTN, HLD, CAD (s/p stent), CHF, COPD and thrombocytopenia who presented to [facility] ED on 2025 via [facility] for new onset right sided weakness, decreased level of alertness.  Last known well  around 1415 when he was found by wife slumped over to his right.  On EMS arrival, patient was found to be hypertensive .  Per reports, by the time [facility] intercepted, patient was not managing his secretions and he was subsequently intubated for airway protection.  Started on propofol for sedation.  Also received fentanyl and Versed en route.  On arrival to the ED, patient was intubated and on propofol.  Propofol held.  Initial SBP>200.  CT head without contrast showed large acute left basal ganglia/thalamus intraparenchymal hemorrhage extending into the left frontal lobe with associated vasogenic edema and mass effect (1.1 cm midline shift), intraventricular extension into the lateral, third and fourth ventricles with mild dilation of the left greater than right lateral ventricles.  CTA head/neck with contrast showed 90% stenosis at the origin of the left ICA, vessels at the superior and inferior aspects of the known parenchymal hematoma of the left basal ganglia/thalamus related to the venous structures with associated contrast blushing up to 3 mm.  Started on Cardene infusion with SBP goal <160.  Given 250cc 3% bolus.  Labs revealed mild hyponatremia (131) but were 
Facility/Department: [address] NEURO   Physical Therapy Daily Treatment Note    Patient Name: Leland Eduardo II        MRN: [id number]    : 1960    Date of Service: 2025    Chief Complaint   Patient presents with    Cerebrovascular Accident     Past Medical History:  has no past medical history on file.  Past Surgical History:  has a past surgical history that includes craniotomy (Left, 2025); Esophagogastroduodenoscopy w/ PEG (2025); and Upper gastrointestinal endoscopy (N/A, 2025).    Discharge Recommendations  Discharge Recommendations: Patient would benefit from continued therapy after discharge  PT Equipment Recommendations  Equipment Needed: No  Other: Pt currently unsafe to attempt mobility unassisted.    Assessment  Pt alert, moving his DAMEON/LEs in bed, trying to make sure his L side was covered, apparently unaware that his RU/LEs were uncovered.  R neglect, L gaze preference, unable to look beyond midline to his R, doesn't turn his head to look R.  Bed mobility max +1 towards pt's L, HOB elevated; dangled ~8 minutes with min to mod A+1 (mod A for attempts to return to supine); bed to chair max A+1, min A of another for safety, pt able to stand upright with wt through LLE, poor ability to take steps to the chair, no wt bearing RLE. Pt appeared comfortable in the chair once up.  Pt is currently unsafe for home DC and is expected to benefit from continued therapies to improve awareness of his R side, mobilize more efficiently and improve functional independence.    Body Structures, Functions, Activity Limitations Requiring Skilled Therapeutic Intervention: Decreased functional mobility ;Decreased strength;Decreased safe awareness;Decreased endurance;Decreased balance;Decreased high-level IADLs;Decreased cognition     Therapy Prognosis: Fair  Decision Making: Medium Complexity  Activity Tolerance  Activity Tolerance: Treatment limited secondary to decreased cognition  Safety 
[facility]  Speech Language Pathology    Date: 1/29/2025  Patient Name: Leland Eduardo II  YOB: 1960   AGE: 64 y.o.  MRN: [id number]        Patient Not Available for Speech Therapy     Due to:  [] Testing  [] Hemodialysis  [] Cancelled by RN  [] Surgery   [x] Intubation/Sedation/Pain Medication  [] Medical instability  [] Other:    Next scheduled treatment: as medically appropriate  Completed by: Leana Brady SLP, M.S. CCC-SLP    
[facility] Neurology   IN-PATIENT SERVICE   [facility]    Progress Note             Date:   2/14/2025  Patient name:  Leland Eduardo II  Date of admission:  1/28/2025  3:33 PM  MRN:   [id number]  Account:  [id number]  YOB: 1960  PCP:    No primary care provider on file.  Room:   [address] [place] [address]  Code Status:    Full Code    Chief Complaint:     Chief Complaint   Patient presents with    Cerebrovascular Accident       Interval hx:     The patient was seen and examined at bedside.      Follows simple commands  No change in neuro exam   BP within goal    Denied any headaches at this time   Placement pending      No antiplatelets at the moment     Brief History of Present Illness:     Leland Eduardo II is a 64-year-old male with a history of HTN, HLD, CAD (s/p stent), CHF, COPD, and thrombocytopenia who was admitted to the Neuro ICU on 1/28/2025 following new-onset right-sided weakness and decreased alertness. Last known well was at 1415 on 1/28, when he was found by his wife slumped over. EMS found him hypertensive () and he was intubated for airway protection, receiving propofol, fentanyl, and Versed during transport. Upon ED arrival, initial imaging showed a large left basal ganglia/thalamic hemorrhage with significant mass effect (1.1 cm midline shift), IVH, and a 90% stenosis at the left ICA. He was started on a Cardene infusion for BP control (goal SBP <160), and 250cc 3% saline bolus was administered. Neurosurgery performed a craniotomy with hematoma evacuation. Post-op, the patient remained intubated and sedated, with some improvement in imaging and clinical status. He was extubated on 1/30, but developed fever and leukocytosis. Empiric antibiotics (Zosyn) were started on 2/1 with immediate improvement in WBC count. By the morning of 2/2, the patient remained stable, with no acute events, but continues to have dense global aphasia, not following commands. Patient has 
Informed Lorie on 1A pt returning d/t elevated wbc, surgery cancelled to let primary RN know.  
[facility] Neurology   IN-PATIENT SERVICE   [facility]    Progress Note             Date:   2/7/2025  Patient name:  Leland Eduardo II  Date of admission:  1/28/2025  3:33 PM  MRN:   [id number]  Account:  [id number]  YOB: 1960  PCP:    No primary care provider on file.  Room:   [address] [place] [address]  Code Status:    Full Code    Chief Complaint:     Chief Complaint   Patient presents with    Cerebrovascular Accident       Interval hx:     The patient was seen and examined at bedside.  Neurologic exam relatively unchanged from yesterday - severely aphasic, following commands with the left upper and lower extremities, withdrawing to pain in the right extremities. Awake at the time of my exam however intermittent e/o lethargy reported.       CT head/sinuses per ID recs  PEG placement once infection ruled out         Brief History of Present Illness:     Leland Eduardo II is a 64-year-old male with a history of HTN, HLD, CAD (s/p stent), CHF, COPD, and thrombocytopenia who was admitted to the Neuro ICU on 1/28/2025 following new-onset right-sided weakness and decreased alertness. Last known well was at 1415 on 1/28, when he was found by his wife slumped over. EMS found him hypertensive () and he was intubated for airway protection, receiving propofol, fentanyl, and Versed during transport. Upon ED arrival, initial imaging showed a large left basal ganglia/thalamic hemorrhage with significant mass effect (1.1 cm midline shift), IVH, and a 90% stenosis at the left ICA. He was started on a Cardene infusion for BP control (goal SBP <160), and 250cc 3% saline bolus was administered. Neurosurgery performed a craniotomy with hematoma evacuation. Post-op, the patient remained intubated and sedated, with some improvement in imaging and clinical status. He was extubated on 1/30, but developed fever and leukocytosis. Empiric antibiotics (Zosyn) were started on 2/1 with immediate improvement 
[facility] Neurology   IN-PATIENT SERVICE   [facility]    Progress Note             Date:   2/13/2025  Patient name:  Leland Eduardo II  Date of admission:  1/28/2025  3:33 PM  MRN:   [id number]  Account:  [id number]  YOB: 1960  PCP:    No primary care provider on file.  Room:   [address] [place] [address]  Code Status:    Full Code    Chief Complaint:     Chief Complaint   Patient presents with    Cerebrovascular Accident       Interval hx:     The patient was seen and examined at bedside.  Seems slightly lethargic this morning but opens eyes to voice. Remains severely aphasic, following commands with the left upper and lower extremities, withdrawing to pain in the right extremities.         Pending placement, likely to encompass  Awaiting morning labs           Brief History of Present Illness:     Leland Eduardo II is a 64-year-old male with a history of HTN, HLD, CAD (s/p stent), CHF, COPD, and thrombocytopenia who was admitted to the Neuro ICU on 1/28/2025 following new-onset right-sided weakness and decreased alertness. Last known well was at 1415 on 1/28, when he was found by his wife slumped over. EMS found him hypertensive () and he was intubated for airway protection, receiving propofol, fentanyl, and Versed during transport. Upon ED arrival, initial imaging showed a large left basal ganglia/thalamic hemorrhage with significant mass effect (1.1 cm midline shift), IVH, and a 90% stenosis at the left ICA. He was started on a Cardene infusion for BP control (goal SBP <160), and 250cc 3% saline bolus was administered. Neurosurgery performed a craniotomy with hematoma evacuation. Post-op, the patient remained intubated and sedated, with some improvement in imaging and clinical status. He was extubated on 1/30, but developed fever and leukocytosis. Empiric antibiotics (Zosyn) were started on 2/1 with immediate improvement in WBC count. By the morning of 2/2, the patient remained 
[facility]  Speech Language Pathology    Date: 2/14/2025  Patient Name: Leland Eduardo II  YOB: 1960   AGE: 64 y.o.  MRN: [id number]        Patient Not Available for Speech Therapy     Due to:  [] Testing  [] Hemodialysis  [] Cancelled by RN  [] Surgery   [] Intubation/Sedation/Pain Medication  [] Medical instability  [x] Other: Pt with RT at bedside getting breathing treatment.     Next scheduled treatment: 2/14 as able, 2/17  Completed by: Wanda Burton M.A., CCC-SLP       
Nephrology Progress Note      SUBJECTIVE       Pt was seen and examined. No acute issues overnight. Stable hemodynamics . Wife at bedside. Urine output not being recorded.     History:  According to documentation, patient initially presented to ED on 25 after being found slumped over to the right at home by his wife.  Last known well was same day around 1415.  On EMS arrival patient was found to be hypertensive with a systolic of 194, [employer] was activated and patient was intubated and route for airway protection.  CT head without contrast showed large acute left basal ganglia/thalami intraparenchymal hemorrhage extending into the left frontal lobe with associated vasogenic edema and mass effect with 1.1 cm midline shift.  He also had intraventricular extension into the lateral, third, and fourth ventricle ventricles with mild dilation left greater than right.  CTA head and neck with contrast showed 90% stenosis at the origin of the left ICA.  Patient was started on Cardene drip, administered 250 cc 3% bolus, admitted to neuro ICU, and shortly thereafter taken for emergent left craniotomy with hematoma evacuation. Patient was extubated on 2025 and subsequently transferred out of neuro ICU on 2025.  He was started on Zosyn for postoperative fever and leukocytosis, and eventually underwent PEG placement for safer administration of tube feeds and medications.  Nephrology has now been consulted for management of hypernatremia.   OBJECTIVE      CURRENT TEMPERATURE:  Temp: 98.1 °F (36.7 °C)  MAXIMUM TEMPERATURE OVER 24HRS:  Temp (24hrs), Av.3 °F (36.8 °C), Min:97.7 °F (36.5 °C), Max:99.5 °F (37.5 °C)    CURRENT RESPIRATORY RATE:  Respirations: 22  CURRENT PULSE:  Pulse: 72  CURRENT BLOOD PRESSURE:  BP: 108/71  24HR BLOOD PRESSURE RANGE:  Systolic (24hrs), Av , Min:100 , Max:109   ; Diastolic (24hrs), Av, Min:71, Max:79    24HR INTAKE/OUTPUT:    Intake/Output Summary (Last 24 hours) at 
Neurosurgery ESTEBAN/Resident    Daily Progress Note   CC:  Chief Complaint   Patient presents with    Cerebrovascular Accident     2/8/2025  6:12 AM    Chart reviewed.  No acute events overnight.  No new complaints. Consulted yesterday for worsen midline shift on most recent CT head. He had craniotomy on 1/28, WBC 18, afebrile   MRI brain showing Large heterogeneous acute hematoma in the left basal ganglia structures, left thalamus and subthalamic area with extension into the left thalamus and   subthalamic area and surrounding areas of edema. There is midline shift from left to right by 10 mm.   Vitals:    02/07/25 2003 02/07/25 2342 02/08/25 0000 02/08/25 0432   BP: (!) 127/92 132/87 (!) 139/91 118/78   Pulse: 80 81 84 86   Resp: 27 25 21 20   Temp: 98.5 °F (36.9 °C) 98.5 °F (36.9 °C)  98.4 °F (36.9 °C)   TempSrc: Oral Axillary  Axillary   SpO2: 100% 100% 96% 98%   Weight:       Height:           PE:   Aphasic  Eyes open spontaneously   Able to cross midline with eyes   Right pupil 4mm reactive  Left pupil 3mm reactive  Localized on left side and has purposeful movements on left side  Withdraws to pain on right side   Incision: CDI       Lab Results   Component Value Date    WBC 18.0 (H) 02/07/2025    HGB 16.8 02/07/2025    HCT 52.0 (H) 02/07/2025     02/07/2025    CHOL 138 01/29/2025    TRIG 82 01/29/2025    HDL 56 01/29/2025     (H) 02/07/2025    K 4.4 02/07/2025     (H) 02/07/2025    CREATININE 0.9 02/07/2025    BUN 33 (H) 02/07/2025    CO2 24 02/07/2025    TSH 0.67 01/29/2025    INR 1.2 01/28/2025    LABA1C 5.2 01/29/2025       Radiology   MRI BRAIN WO CONTRAST    Result Date: 2/8/2025  EXAMINATION: MRI OF THE BRAIN WITHOUT CONTRAST  2/7/2025 8:44 pm TECHNIQUE: Multiplanar multisequence MRI of the brain was performed without the administration of intravenous contrast. COMPARISON: Noncontrast CT scan of the head on 02/07/2025 at 11:17 a.m. CTA of head on 02/07/2025 at 5:30 p.m. HISTORY: ORDERING 
Neurosurgery ESTEBAN/Resident    Daily Progress Note   CC:  Chief Complaint   Patient presents with    Cerebrovascular Accident     2/9/2025  7:20 AM    Chart reviewed.  No acute events overnight.  No new complaints. Appears more alert and awake this morning     Vitals:    02/08/25 1938 02/08/25 2006 02/09/25 0000 02/09/25 0334   BP: 114/79  102/74 100/64   Pulse: (!) 109 (!) 110 100 (!) 109   Resp: 26 26 (!) 32 19   Temp: 99.6 °F (37.6 °C)  99.4 °F (37.4 °C) 99.7 °F (37.6 °C)   TempSrc: Oral  Axillary Axillary   SpO2: 100%  94% 96%   Weight:       Height:           PE:   E4 V1 M5 10  Eyes open spontaneously  PERRL  Localizing with LUE   Withdraws to pain to Right side   Did not follow commands  Surgical site intact closed with staples             Lab Results   Component Value Date    WBC 18.5 (H) 02/09/2025    HGB 16.4 02/09/2025    HCT 51.2 (H) 02/09/2025     02/09/2025    CHOL 138 01/29/2025    TRIG 82 01/29/2025    HDL 56 01/29/2025     (H) 02/09/2025    K 3.9 02/09/2025     (H) 02/09/2025    CREATININE 1.1 02/09/2025    BUN 42 (H) 02/09/2025    CO2 23 02/09/2025    TSH 0.67 01/29/2025    INR 1.2 01/28/2025    LABA1C 5.2 01/29/2025         A/P  64 y.o. male who presents with intracranial hemorrhage.  POD#11 s/p craniotomy for hematoma evacuation.   CTH concerning for worsening MLS with evolving left MCA infarct     Neuro checks per protocol  HOB 30 degrees  Lovenox for DVT prophylaxis         Please contact neurosurgery with any changes in patients neurologic status.       Lew Wilcox, CNP  2/9/25  7:20 AM         
Neurosurgery ESTEBAN/Resident    Daily Progress Note   Chief Complaint   Patient presents with    Cerebrovascular Accident     1/29/2025  8:38 AM    Chart reviewed.  No acute events overnight.  No new complaints. Tmax 100.6. WBC 11.6.     Vitals:    01/29/25 0530 01/29/25 0545 01/29/25 0600 01/29/25 0728   BP:       Pulse: 68 64 60 65   Resp: 19 20 18 18   Temp: 99.7 °F (37.6 °C) 99.7 °F (37.6 °C) 99.7 °F (37.6 °C)    TempSrc:       SpO2: 100% 100% 100% 100%   Weight:       Height:             PE:   Intubated without sedation  PERRL  Purposeful movement to LUE and LLE  WD to RUE and RLE    E2 V1T M5  GCS 8T    Incision: CDI      Lab Results   Component Value Date    WBC 11.6 (H) 01/29/2025    HGB 13.8 01/29/2025    HCT 39.9 (L) 01/29/2025     01/29/2025    CHOL 138 01/29/2025    TRIG 82 01/29/2025    HDL 56 01/29/2025     01/29/2025    K 3.3 (L) 01/29/2025     01/29/2025    CREATININE 0.7 01/29/2025    BUN 7 (L) 01/29/2025    CO2 23 01/29/2025    INR 1.2 01/28/2025    LABA1C 5.2 01/29/2025       Radiology   XR CHEST PORTABLE    Result Date: 1/29/2025  EXAMINATION: ONE XRAY VIEW OF THE CHEST 1/29/2025 5:33 am COMPARISON: 1/28/2025 HISTORY: ORDERING SYSTEM PROVIDED HISTORY: ETT placement, intubated TECHNOLOGIST PROVIDED HISTORY: ETT placement, intubated FINDINGS: Endotracheal tube tip 8 cm above the eleonora.  Nasogastric tube tip overlies the gastric fundus with side hole in the distal esophagus.  No airspace consolidations.  No pleural effusion or pneumothorax.  Heart size is within normal limits.  Possible emphysematous changes.     1. Endotracheal tube tip 8 cm above the eleonora. 2. Slight interval advancement of the NG tube although the side hole is suspected to remain in the distal esophagus. 3. No acute cardiopulmonary process.     CT head without contrast    Result Date: 1/29/2025  EXAMINATION: CT OF THE HEAD WITHOUT CONTRAST,  1/29/2025 5:06 am TECHNIQUE: CT of the head was performed without the 
Neurosurgery ESTEBAN/Resident    Daily Progress Note   Chief Complaint   Patient presents with    Cerebrovascular Accident     1/30/2025  8:01 AM    Chart reviewed.  No acute events overnight.  No new complaints. Vitals stable. WBC 15.0. ECHO completed with EF 55% with mildy elevated RVSP consistent with mild pulmonary hypertension.     Vitals:    01/30/25 0345 01/30/25 0400 01/30/25 0500 01/30/25 0600   BP:  (!) 158/82 125/75 136/73   Pulse: 76 92 58 59   Resp: 21 25 18 18   Temp: 99.4 °F (37.4 °C) 99.3 °F (37.4 °C) 99.1 °F (37.3 °C) 99.2 °F (37.3 °C)   TempSrc:       SpO2:  100% 100%    Weight:       Height:             PE:   Intubated without sedation  PERRL  Opens eyes to voice  Purposeful movement to LUE and LLE  localizes to RUE and RLE     E3 V1T M5  GCS 8T     Incision: CDI      Lab Results   Component Value Date    WBC 15.0 (H) 01/30/2025    HGB 13.6 01/30/2025    HCT 39.9 (L) 01/30/2025     01/30/2025    CHOL 138 01/29/2025    TRIG 82 01/29/2025    HDL 56 01/29/2025     01/30/2025    K 3.3 (L) 01/30/2025     01/30/2025    CREATININE 0.6 (L) 01/30/2025    BUN 9 01/30/2025    CO2 22 01/30/2025    TSH 0.67 01/29/2025    INR 1.2 01/28/2025    LABA1C 5.2 01/29/2025       A/P  64 y.o. male who presents with intracranial hemorrhage.  POD #2 s/p: craniotomy for hematoma evacuation     Neuro checks per floor protocol  Hold all antiplatelets and anticoagulants  Vent and sedation management per [facility]  Cardene for BP management       Please contact neurosurgery with any changes in patients neurologic status.       Electronically signed by ADOLPH Ferro CNP on 1/30/2025 at 8:01 AM        
Neurosurgery ESTEBAN/Resident    Daily Progress Note   Chief Complaint   Patient presents with    Cerebrovascular Accident     1/31/2025  10:09 AM    Chart reviewed.  Tmax 101.2 overnight. Rest of vitals stable. WBC up to 16.4. Extubated yesterday.     Vitals:    01/31/25 0645 01/31/25 0700 01/31/25 0800 01/31/25 0900   BP:   (!) 117/91 133/68   Pulse: 61 80 75 67   Resp: 21 16 27 23   Temp: 100.3 °F (37.9 °C) 100.2 °F (37.9 °C) 99.8 °F (37.7 °C) 99.5 °F (37.5 °C)   TempSrc:   Bladder    SpO2: 98% 100% 100% 97%   Weight:       Height:             PE:   Extubated yesterday  Aphasic  Right pupil 4  Left pupil 2  Opens eyes to voice  Purposeful movement to LUE and LLE  Localizes to RUE and RLE      Lab Results   Component Value Date    WBC 16.4 (H) 01/31/2025    HGB 14.1 01/31/2025    HCT 41.2 01/31/2025     01/31/2025    CHOL 138 01/29/2025    TRIG 82 01/29/2025    HDL 56 01/29/2025     01/31/2025    K 3.2 (L) 01/31/2025     (H) 01/31/2025    CREATININE 0.6 (L) 01/31/2025    BUN 11 01/31/2025    CO2 22 01/31/2025    TSH 0.67 01/29/2025    INR 1.2 01/28/2025    LABA1C 5.2 01/29/2025       A/P  64 y.o. male who presents with intracranial hemorrhage.  POD #3 s/p: craniotomy for hematoma evacuation     Neuro checks per floor protocol  Hold all antiplatelets and anticoagulants  Rest of care per NCC  Okay for DVT prophylaxis      Please contact neurosurgery with any changes in patients neurologic status.       Electronically signed by ADOLPH Ferro CNP on 1/31/2025 at 10:09 AM        
Neurosurgery ESTEBAN/Resident    Daily Progress Note   Chief Complaint   Patient presents with    Cerebrovascular Accident     2/1/2025  7:27 AM    Chart reviewed. Restless overnight. Sitter at bedside.     Vitals:    02/01/25 0400 02/01/25 0500 02/01/25 0600 02/01/25 0700   BP: (!) 147/80 137/71 (!) 140/85 (!) 158/83   Pulse: 56 53 80 57   Resp: 18 20 18 20   Temp: 98.8 °F (37.1 °C)  99.5 °F (37.5 °C)    TempSrc:       SpO2: 98% 98% 100% 100%   Weight:   62 kg (136 lb 11 oz)    Height:             PE:   Alert, does not verbalize  Does not follow commands  Pupils right 3 and left 2  Motor   Purposeful movement to LUE and LLE  Localizes to RUE and RLE    Incision left sided cranial dressing removed and site left open to air, d/c      Lab Results   Component Value Date    WBC 21.8 (H) 02/01/2025    HGB 15.6 02/01/2025    HCT 47.5 02/01/2025     02/01/2025    CHOL 138 01/29/2025    TRIG 82 01/29/2025    HDL 56 01/29/2025     02/01/2025    K 3.6 (L) 02/01/2025     (H) 02/01/2025    CREATININE 0.7 02/01/2025    BUN 14 02/01/2025    CO2 18 (L) 02/01/2025    TSH 0.67 01/29/2025    INR 1.2 01/28/2025    LABA1C 5.2 01/29/2025         A/P  64 y.o. male who presents with intracranial hemorrhage.  POD 4 s/p craniotomy for hematoma evacuation     - activity as tolerated, PT and OT   - supportive care     Please contact neurosurgery with any changes in patients neurologic status.       Lew Parker, CNP  2/1/25  7:27 AM        
Neurosurgery ESTEBAN/Resident    Daily Progress Note   Chief Complaint   Patient presents with    Cerebrovascular Accident     2/7/2025  12:59 PM    Patient is a post op craniotomy. Per neurology patient has seemed more restless the last couple of days as well as more drowsy. CTH was ordered which showed a worsening MLS as well as evolving left MCA infarct. WBC 18.0. CT Sinuses pending. Tmax 100.6.    Vitals:    02/07/25 0500 02/07/25 0728 02/07/25 1056 02/07/25 1229   BP:       Pulse: 81   94   Resp: (!) 31   (!) 31   Temp:  100.3 °F (37.9 °C) 99.4 °F (37.4 °C)    TempSrc:  Axillary Axillary    SpO2: 95%      Weight:       Height:             PE:   Drowsy  Opens eyes to voice  Globally aphasic  Pupils brisk, right greater than left  Left gaze deviation  Right facial droop  Spontaneous to LUE and LLE  Intermittently following commands with LUE and LLE  WD to RUE and RLE    Incision: CDI      Lab Results   Component Value Date    WBC 18.0 (H) 02/07/2025    HGB 16.8 02/07/2025    HCT 52.0 (H) 02/07/2025     02/07/2025    CHOL 138 01/29/2025    TRIG 82 01/29/2025    HDL 56 01/29/2025     (H) 02/07/2025    K 4.4 02/07/2025     (H) 02/07/2025    CREATININE 0.9 02/07/2025    BUN 33 (H) 02/07/2025    CO2 24 02/07/2025    TSH 0.67 01/29/2025    INR 1.2 01/28/2025    LABA1C 5.2 01/29/2025       Radiology   CT HEAD WO CONTRAST    Result Date: 2/7/2025  EXAMINATION: CT OF THE HEAD WITHOUT CONTRAST  2/7/2025 11:13 am TECHNIQUE: CT of the head was performed without the administration of intravenous contrast. Automated exposure control, iterative reconstruction, and/or weight based adjustment of the mA/kV was utilized to reduce the radiation dose to as low as reasonably achievable. COMPARISON: 01/29/2025. HISTORY: ORDERING SYSTEM PROVIDED HISTORY: interval scan, drowsiness TECHNOLOGIST PROVIDED HISTORY: interval scan, drowsiness FINDINGS: BRAIN/VENTRICLES: There is a large area of intraparenchymal hemorrhage 
[facility] Neurology   IN-PATIENT SERVICE   [facility]    Progress Note             Date:   2/5/2025  Patient name:  Leland Eduardo II  Date of admission:  1/28/2025  3:33 PM  MRN:   [id number]  Account:  [id number]  YOB: 1960  PCP:    No primary care provider on file.  Room:   [address] [place] [address]  Code Status:    Full Code    Chief Complaint:     Chief Complaint   Patient presents with    Cerebrovascular Accident       Interval hx:     The patient was seen and examined at bedside.  Neurologic exam unchanged from yesterday-severely aphasic, following commands with the left upper and lower extremities, withdrawing to pain in the right extremities.       WBCs continue to climb, infectious workup was repeated yesterday, awaiting UA  Na 154 > 147 today            Brief History of Present Illness:     Leland Eduardo II is a 64-year-old male with a history of HTN, HLD, CAD (s/p stent), CHF, COPD, and thrombocytopenia who was admitted to the Neuro ICU on 1/28/2025 following new-onset right-sided weakness and decreased alertness. Last known well was at 1415 on 1/28, when he was found by his wife slumped over. EMS found him hypertensive () and he was intubated for airway protection, receiving propofol, fentanyl, and Versed during transport. Upon ED arrival, initial imaging showed a large left basal ganglia/thalamic hemorrhage with significant mass effect (1.1 cm midline shift), IVH, and a 90% stenosis at the left ICA. He was started on a Cardene infusion for BP control (goal SBP <160), and 250cc 3% saline bolus was administered. Neurosurgery performed a craniotomy with hematoma evacuation. Post-op, the patient remained intubated and sedated, with some improvement in imaging and clinical status. He was extubated on 1/30, but developed fever and leukocytosis. Empiric antibiotics (Zosyn) were started on 2/1 with immediate improvement in WBC count. By the morning of 2/2, the patient 
Notified primary of patient sounding junky and crackles in lung bases. Chest xray ordered.   
Nutrition Assessment     Type and Reason for Visit: Reassess    Nutrition Recommendations/Plan:   Continue NPO as necessary  When able, re-start previous TF regimen  Monitor PEG placement, TF initiation, wt, meds, labs, POC     Malnutrition Assessment:  Malnutrition Status: Severe malnutrition    Nutrition Assessment:  Pt was tolerating TF. Spoke with RN and pt had pulled NG out prior to writers visit. Planned for PEG placement, but pt was not on OR list today. LBM 2/9. Labs: Na 152 mmol/L. Meds: dulcolax, protonix, glycolax, senokot, milk of molasses enema.    Estimated Daily Nutrient Needs:  Energy (kcal):  8719-4214 kcals/d Weight Used for Energy Requirements: Current     Protein (g):  100-150 gm/d Weight Used for Protein Requirements: Current        Fluid (ml/day):  or per physician Method Used for Fluid Requirements: 1 ml/kcal    Nutrition Related Findings:   Meds/labs reviewed; +1 non-pitting RUE edema; Wound Type: Multiple, Surgical Incision    Current Nutrition Therapies:    Diet NPO    Anthropometric Measures:  Height: 188 cm (6' 2\")  Current Body Wt: 54.9 kg (121 lb)   BMI: 15.5      Nutrition Diagnosis:   Inadequate oral intake related to swallowing difficulty as evidenced by NPO or clear liquid status due to medical condition, nutrition support - enteral nutrition  Severe malnutrition, in context of acute illness or injury related to  (unknown) as evidenced by criteria as identified in malnutrition assessment    Nutrition Interventions:   Food and/or Nutrient Delivery: Continue NPO, Start Tube Feeding  Nutrition Education/Counseling: No recommendation at this time  Coordination of Nutrition Care: Continue to monitor while inpatient    Goals:  Goals: Meet at least 75% of estimated needs, prior to discharge  Type of Goal: Continue current goal  Previous Goal Met: Progressing toward Goal(s)    Nutrition Monitoring and Evaluation:   Behavioral-Environmental Outcomes: None Identified  Food/Nutrient Intake 
Nutrition Assessment     Type and Reason for Visit: Reassess    Nutrition Recommendations/Plan:   Continue current TF as tolerated     Malnutrition Assessment:  Malnutrition Status: Severe malnutrition    Nutrition Assessment:  Tolerating PEG feeds @ goal . Pending SNF placement. LBM 2/13. +1 non-pitting RUE edema. Labs: Na 147 mmol/L.    Estimated Daily Nutrient Needs:  Energy (kcal):  9841-8232 kcals/d Weight Used for Energy Requirements: Current     Protein (g):  100-150 gm/d Weight Used for Protein Requirements: Current        Fluid (ml/day):  or per physician Method Used for Fluid Requirements: 1 ml/kcal    Nutrition Related Findings:   Meds/labs reviewed; +1 non-pitting RUE edema; Wound Type: Multiple, Surgical Incision    Current Nutrition Therapies:    Diet NPO  ADULT TUBE FEEDING; PEG; Standard with Fiber; Continuous; 10; Yes; 10; Q 4 hours; 65; 400; Q 4 hours    Anthropometric Measures:  Height: 188 cm (6' 2\")  Current Body Wt: 53.5 kg (118 lb)   BMI: 15.1    Nutrition Diagnosis:   Severe malnutrition, in context of acute illness or injury related to  (unknown) as evidenced by criteria as identified in malnutrition assessment    Nutrition Interventions:   Food and/or Nutrient Delivery: Continue Current Tube Feeding  Nutrition Education/Counseling: No recommendation at this time  Coordination of Nutrition Care: Continue to monitor while inpatient    Goals:  Goals: Meet at least 75% of estimated needs, prior to discharge  Type of Goal: Continue current goal  Previous Goal Met: Goal(s) Achieved    Nutrition Monitoring and Evaluation:   Behavioral-Environmental Outcomes: None Identified  Food/Nutrient Intake Outcomes: Enteral Nutrition Intake/Tolerance  Physical Signs/Symptoms Outcomes: Biochemical Data, Chewing or Swallowing, Weight    Discharge Planning:    Enteral Nutrition     Maued Kwon, MS, RDN, LDN  Contact: [phone]    
Nutrition Assessment     Type and Reason for Visit: Reassess, Consult    Nutrition Recommendations/Plan:   Start TF of Standard with fiber with goal of 65 mL/hr  Continue to replace potassium as necessary  Monitor POC, ability to start PO diet, TF tolerance and adequacy, wt, meds, labs     Malnutrition Assessment:  Malnutrition Status: Insufficient data    Nutrition Assessment:  Extubated 1/30. Failed BSSE and plans to start TF per RN. Consulted for TF orders and management. Writer ordered standard with fiber (Jevity 1.5) with goal of 65 mL/hr to provide 2340 kcals and 99 gm PRO/d. Labs: K 3.2 mmol/L. Meds: Effer-K, Magnesium sulfate.    Estimated Daily Nutrient Needs:  Energy (kcal):  3455-1031 kcals/d Weight Used for Energy Requirements: Current     Protein (g):  100-150 gm/d Weight Used for Protein Requirements: Current        Fluid (ml/day):  or per physician Method Used for Fluid Requirements: 1 ml/kcal    Nutrition Related Findings:   Meds/labs reviewed Wound Type: Surgical Incision    Current Nutrition Therapies:    Diet NPO  ADULT TUBE FEEDING; Nasogastric; Standard with Fiber; Continuous; 10; Yes; 10; Q 6 hours; 65; 30; Q 6 hours    Anthropometric Measures:  Height: 188 cm (6' 2\")  Current Body Wt: 85 kg (187 lb 6.3 oz)   BMI: 24    Nutrition Diagnosis:   Inadequate oral intake related to swallowing difficulty as evidenced by NPO or clear liquid status due to medical condition, nutrition support - enteral nutrition    Nutrition Interventions:   Food and/or Nutrient Delivery: Continue NPO, Start Tube Feeding  Nutrition Education/Counseling: No recommendation at this time  Coordination of Nutrition Care: Continue to monitor while inpatient    Goals:  Goals: Tolerate nutrition support at goal rate, by next RD assessment  Type of Goal: New goal  Previous Goal Met: Goal(s) Achieved    Nutrition Monitoring and Evaluation:   Behavioral-Environmental Outcomes: None Identified  Food/Nutrient Intake Outcomes: Enteral 
Occupational Therapy    [facility]  Occupational Therapy Not Seen Note    DATE: 2025    NAME: Leland Eduardo II  MRN: [id number]   : 1960      Patient not seen this date for Occupational Therapy due to:        Not seen for OT treatment this date due to off floor for surgery:  Peg tube placement. Will continue as able.            Electronically signed by TORSTEN Blankenship on 2025 at 8:30 AM   
Occupational Therapy    [facility]  Occupational Therapy Not Seen Note    DATE: 2/3/2025    NAME: Leland Eduardo II  MRN: [id number]   : 1960      Patient not seen this date for Occupational Therapy due to:    Patient off floor for swallow study. Will continue as able.     Electronically signed by ADRIENNE Lind on 2/3/2025 at 2:14 PM   TORSTEN Blankenship  
Occupational Therapy  Occupational Therapy Daily Treatment Note  Facility/Department: [address] NEURO ICU   Patient Name: Leland Eduardo II        MRN: [id number]    : 1960    Date of Service: 2025    Chief Complaint   Patient presents with    Cerebrovascular Accident     Past Medical History:  has no past medical history on file.  Past Surgical History:  has a past surgical history that includes craniotomy (Left, 2025).    Discharge Recommendations  Discharge Recommendations: Patient would benefit from continued therapy after discharge Further therapy recommended at discharge.The patient should be able to tolerate at least 3 hours of therapy per day over 5 days or 15 hours over 7 days.   This patient may benefit from a Physical Medicine and Rehab consult.      Assessment  Performance deficits / Impairments: Decreased functional mobility ;Decreased ADL status;Decreased strength;Decreased safe awareness;Decreased cognition;Decreased balance;Decreased vision/visual deficit;Decreased coordination;Decreased posture;Decreased high-level IADLs  Prognosis: Good  Activity Tolerance  Activity Tolerance: Patient Tolerated treatment well;Treatment limited secondary to decreased cognition;Treatment limited secondary to medical complications (free text)  Activity Tolerance Comments: Global aphasia.  Safety Devices  Type of Devices: Call light within reach;Gait belt;Nurse notified;Left in bed;Bed alarm in place  Restraints  Restraints Initially in Place: Yes  Restraints: L neuro mitt    AM-PAC  AM-PAC Daily Activity - Inpatient   How much help is needed for putting on and taking off regular lower body clothing?: Total  How much help is needed for bathing (which includes washing, rinsing, drying)?: Total  How much help is needed for toileting (which includes using toilet, bedpan, or urinal)?: Total  How much help is needed for putting on and taking off regular upper body clothing?: A Lot  How much help is needed for 
Occupational Therapy  Occupational Therapy Daily Treatment Note  Facility/Department: [address] [facility]UR[facility]   Patient Name: Leland Eduardo II        MRN: [id number]    : 1960    Date of Service: 2025    Chief Complaint   Patient presents with    Cerebrovascular Accident     Past Medical History:  has no past medical history on file.  Past Surgical History:  has a past surgical history that includes craniotomy (Left, 2025).    Discharge Recommendations  Discharge Recommendations: Patient would benefit from continued therapy after discharge       Assessment  Performance deficits / Impairments: Decreased functional mobility ;Decreased ADL status;Decreased strength;Decreased safe awareness;Decreased cognition;Decreased balance;Decreased vision/visual deficit;Decreased coordination;Decreased posture;Decreased high-level IADLs;Decreased ROM;Decreased endurance;Decreased fine motor control  Assessment: Pt limited by above deficits impacting pts functional mobility/ADL performance. Pt is expected to require skilled OT services to increase safety and promote increased independence t/o ADL/IADLs and functional mobility tasks.  Prognosis: Fair  Activity Tolerance  Activity Tolerance: Patient Tolerated treatment well;Patient limited by fatigue;Treatment limited secondary to decreased cognition  Activity Tolerance Comments: Global aphasia.  Safety Devices  Type of Devices: Call light within reach;Nurse notified;Gait belt;Patient at risk for falls;All fall risk precautions in place;Left in chair;Chair alarm in place  Restraints  Restraints Initially in Place: No    AM-PAC  AM-PAC Daily Activity - Inpatient   How much help is needed for putting on and taking off regular lower body clothing?: Total  How much help is needed for bathing (which includes washing, rinsing, drying)?: Total  How much help is needed for toileting (which includes using toilet, bedpan, or urinal)?: Total  How much help is needed for putting on and 
Occupational Therapy  Occupational Therapy Daily Treatment Note  Facility/Department: [address] NEUR[facility]   Patient Name: Leland Eduardo II        MRN: [id number]    : 1960    Date of Service: 2025      Past Medical History:  has no past medical history on file.  Past Surgical History:  has a past surgical history that includes craniotomy (Left, 2025); Esophagogastroduodenoscopy w/ PEG (2025); and Upper gastrointestinal endoscopy (N/A, 2025).    Discharge Recommendations  Discharge Recommendations: Patient would benefit from continued therapy after discharge  OT Equipment Recommendations  Other: patient currently requires 2x skilled assistance for all aspects of treatment and will continue to assess for equipment needs as patient is appropriate.    Assessment  Performance deficits / Impairments: Decreased functional mobility ;Decreased ADL status;Decreased strength;Decreased safe awareness;Decreased cognition;Decreased balance;Decreased vision/visual deficit;Decreased coordination;Decreased posture;Decreased high-level IADLs;Decreased ROM;Decreased endurance;Decreased fine motor control  Assessment: patient limited due to above deficits, will continue to benefit from skilled OT services to increase overall IND with all ADL tasks  Prognosis: Fair  Activity Tolerance  Activity Tolerance: Treatment limited secondary to decreased cognition  Safety Devices  Type of Devices: Nurse notified;Call light within reach;Bed alarm in place;Patient at risk for falls;Left in bed;All fall risk precautions in place  Restraints  Restraints Initially in Place: No    AM-PAC  AM-PAC Daily Activity - Inpatient   How much help is needed for putting on and taking off regular lower body clothing?: Total  How much help is needed for bathing (which includes washing, rinsing, drying)?: A Lot  How much help is needed for toileting (which includes using toilet, bedpan, or urinal)?: Total  How much help is needed for 
Occupational Therapy  Occupational Therapy Daily Treatment Note  Facility/Department: [address] NEURO   Patient Name: Leland Eduardo II        MRN: [id number]    : 1960    Date of Service: 2025      Past Medical History:  has no past medical history on file.  Past Surgical History:  has a past surgical history that includes craniotomy (Left, 2025) and Esophagogastroduodenoscopy w/ PEG (2025).    Discharge Recommendations  Discharge Recommendations: Patient would benefit from continued therapy after discharge  OT Equipment Recommendations  Other: patient currently requires 2x skilled assistance for all aspects of treatment and will continue to assess for equipment needs as patient is appropriate.    Assessment  Performance deficits / Impairments: Decreased functional mobility ;Decreased ADL status;Decreased strength;Decreased safe awareness;Decreased cognition;Decreased balance;Decreased vision/visual deficit;Decreased coordination;Decreased posture;Decreased high-level IADLs;Decreased ROM;Decreased endurance;Decreased fine motor control  Assessment: patient limited due to above deficits, will continue to benefit from skilled OT services to increase overall IND with all ADL tasks  Prognosis: Fair  Activity Tolerance  Activity Tolerance: Treatment limited secondary to decreased cognition  Safety Devices  Type of Devices: Nurse notified;Call light within reach;Bed alarm in place;Patient at risk for falls;Left in bed  Restraints  Restraints Initially in Place: No    AM-PAC  AM-PAC Daily Activity - Inpatient   How much help is needed for putting on and taking off regular lower body clothing?: Total  How much help is needed for bathing (which includes washing, rinsing, drying)?: A Lot  How much help is needed for toileting (which includes using toilet, bedpan, or urinal)?: Total  How much help is needed for putting on and taking off regular upper body clothing?: A Lot  How much help is needed for 
Occupational Therapy  Occupational Therapy Daily Treatment Note  Facility/Department: [address] NEURO   Patient Name: Leland Eduardo II        MRN: [id number]    : 1960    Date of Service: 2025      Past Medical History:  has no past medical history on file.  Past Surgical History:  has a past surgical history that includes Esophagogastroduodenoscopy w/ PEG (2025); Upper gastrointestinal endoscopy (N/A, 2025); and craniotomy (Left, 2025).    Discharge Recommendations  Discharge Recommendations: Patient would benefit from continued therapy after discharge  OT Equipment Recommendations  Other: patient currently requires 2x skilled assistance for all aspects of treatment and will continue to assess for equipment needs as patient is appropriate.    Assessment  Performance deficits / Impairments: Decreased functional mobility ;Decreased ADL status;Decreased strength;Decreased safe awareness;Decreased cognition;Decreased balance;Decreased vision/visual deficit;Decreased coordination;Decreased posture;Decreased high-level IADLs;Decreased ROM;Decreased endurance;Decreased fine motor control  Assessment: patient limited due to above deficits, will continue to benefit from skilled OT services to increase overall IND with all ADL tasks  Activity Tolerance  Activity Tolerance: Treatment limited secondary to agitation  Safety Devices  Type of Devices: Call light within reach;Left in bed;Bed alarm in place;Patient at risk for falls;Nurse notified  Restraints  Restraints Initially in Place: No    AM-PAC  AM-PAC Daily Activity - Inpatient   How much help is needed for putting on and taking off regular lower body clothing?: Total  How much help is needed for toileting (which includes using toilet, bedpan, or urinal)?: Total  How much help is needed for putting on and taking off regular upper body clothing?: A Lot  How much help is needed for taking care of personal grooming?: A Lot  How much help for 
Occupational Therapy  Occupational Therapy Daily Treatment Note  Facility/Department: [address] NEURO   Patient Name: Leland Eduardo II        MRN: [id number]    : 1960    Date of Service: 2025    Chief Complaint   Patient presents with    Cerebrovascular Accident     Past Medical History:  has no past medical history on file.  Past Surgical History:  has a past surgical history that includes craniotomy (Left, 2025).    Discharge Recommendations  Discharge Recommendations: Patient would benefit from continued therapy after discharge  OT Equipment Recommendations  Other: Pt currently requires 2x skilled assistance for all aspects of mobility and will continue to assess for equipment needs as pt is appropriate.    Assessment  Performance deficits / Impairments: Decreased functional mobility ;Decreased ADL status;Decreased strength;Decreased safe awareness;Decreased cognition;Decreased balance;Decreased vision/visual deficit;Decreased coordination;Decreased posture;Decreased high-level IADLs;Decreased ROM;Decreased endurance;Decreased fine motor control  Assessment: Pt is expected to require skilled OT services during their acute hospitalization stay to address the above noted deficits through skilled occupational therapy intervention for promotion of increased independence throughout ADLs, IADLs and functional mobility tasks. Pt is currently unsafe to return to their prior living arrangements without  assist/supervision to safely engage in all aspects of ADLs, IADLs and functional mobility tasks.   Prognosis: Fair  REQUIRES OT FOLLOW-UP: Yes  Activity Tolerance  Activity Tolerance: Patient limited by fatigue;Treatment limited secondary to decreased cognition  Safety Devices  Type of Devices: Call light within reach;Nurse notified;Gait belt;Patient at risk for falls;Bed alarm in place;Left in bed  Restraints  Restraints Initially in Place: No    AM-PAC  AM-PAC Daily Activity - Inpatient   How much 
Occupational Therapy  Occupational Therapy Daily Treatment Note  Facility/Department: [address] NEUR[facility]   Patient Name: Leland Eduardo II        MRN: [id number]    : 1960    Date of Service: 2025    Chief Complaint   Patient presents with    Cerebrovascular Accident     Past Medical History:  has no past medical history on file.  Past Surgical History:  has a past surgical history that includes craniotomy (Left, 2025); Esophagogastroduodenoscopy w/ PEG (2025); and Upper gastrointestinal endoscopy (N/A, 2025).    Discharge Recommendations  Discharge Recommendations: Patient would benefit from continued therapy after discharge in order to increase pt strength, balance and independence with ADL tasks and functional transfers       Assessment  Performance deficits / Impairments: Decreased functional mobility ;Decreased ADL status;Decreased strength;Decreased safe awareness;Decreased cognition;Decreased balance;Decreased vision/visual deficit;Decreased coordination;Decreased posture;Decreased high-level IADLs;Decreased ROM;Decreased endurance;Decreased fine motor control  Prognosis: Fair  REQUIRES OT FOLLOW-UP: Yes  Activity Tolerance  Activity Tolerance: Treatment limited secondary to decreased cognition;Treatment limited secondary to agitation;Treatment limited secondary to medical complications (free text)  Activity Tolerance Comments: hypotension and global aphasia  Safety Devices  Type of Devices: Call light within reach;Left in bed;Bed alarm in place;Patient at risk for falls;Nurse notified  Restraints  Restraints Initially in Place: No    AM-PAC  AM-PAC Daily Activity - Inpatient   How much help is needed for putting on and taking off regular lower body clothing?: Total  How much help is needed for bathing (which includes washing, rinsing, drying)?: Total  How much help is needed for toileting (which includes using toilet, bedpan, or urinal)?: Total  How much help is needed for putting on 
Occupational Therapy  Occupational Therapy Daily Treatment Note  Facility/Department: [address] NEUR[facility]   Patient Name: Leland Eduardo II        MRN: [id number]    : 1960    Date of Service: 2025    Chief Complaint   Patient presents with    Cerebrovascular Accident     Past Medical History:  has no past medical history on file.  Past Surgical History:  has a past surgical history that includes craniotomy (Left, 2025).    Discharge Recommendations  Discharge Recommendations: Further therapy recommended at discharge.The patient should be able to tolerate at least 3 hours of therapy per day over 5 days or 15 hours over 7 days.   This patient may benefit from a Physical Medicine and Rehab consult.     OT Equipment Recommendations  Other: Pt currently requires 2x skilled assistance for all aspects of mobility and will continue to assess for equipment needs as pt is appropriate.    Assessment  Performance deficits / Impairments: Decreased functional mobility ;Decreased ADL status;Decreased strength;Decreased safe awareness;Decreased cognition;Decreased balance;Decreased vision/visual deficit;Decreased coordination;Decreased posture;Decreased high-level IADLs;Decreased ROM;Decreased endurance;Decreased fine motor control  Assessment: Pt is making steady progress towards occupational therapy goals as evidenced by the below documented performance. Pt is still significantly limited by decreased strength, ROM, cognition, balance and endurance. Pt is expected to require skilled OT services during their acute hospitalization stay to address the above noted deficits through skilled occupational therapy intervention for promotion of increased independence throughout ADLs, IADLs and functional mobility tasks. Pt is currently unsafe to return to their prior living arrangements without 24/7 assist/supervision to safely engage in all aspects of ADLs, IADLs and functional mobility tasks.  Prognosis: Fair  REQUIRES OT 
Occupational Therapy  Occupational Therapy Daily Treatment Note  Facility/Department: [address] NEUR[facility]   Patient Name: Leland Eduardo II        MRN: [id number]    : 1960    Date of Service: 2025    Chief Complaint   Patient presents with    Cerebrovascular Accident     Past Medical History:  has no past medical history on file.  Past Surgical History:  has a past surgical history that includes craniotomy (Left, 2025).    Discharge Recommendations  Discharge Recommendations: Patient would benefit from continued therapy after discharge. Further therapy recommended at discharge.The patient should be able to tolerate at least 3 hours of therapy per day over 5 days or 15 hours over 7 days.   This patient may benefit from a Physical Medicine and Rehab consult.          Assessment  Performance deficits / Impairments: Decreased functional mobility ;Decreased ADL status;Decreased strength;Decreased safe awareness;Decreased cognition;Decreased balance;Decreased vision/visual deficit;Decreased coordination;Decreased posture;Decreased high-level IADLs;Decreased ROM;Decreased endurance;Decreased fine motor control  Prognosis: Fair  REQUIRES OT FOLLOW-UP: Yes  Activity Tolerance  Activity Tolerance: Treatment limited secondary to decreased cognition;Patient limited by fatigue  Safety Devices  Type of Devices: Nurse notified;Call light within reach;Bed alarm in place;Patient at risk for falls;Left in bed  Restraints  Restraints Initially in Place: No  Restraints: FDS    AM-PAC  AM-PAC Daily Activity - Inpatient   How much help is needed for putting on and taking off regular lower body clothing?: Total  How much help is needed for bathing (which includes washing, rinsing, drying)?: A Lot  How much help is needed for toileting (which includes using toilet, bedpan, or urinal)?: Total  How much help is needed for putting on and taking off regular upper body clothing?: A Lot  How much help is needed for taking care of 
Occupational Therapy Initial Evaluation  Facility/Department: [address] NEURO ICU   Patient Name: Leland Eduardo II        MRN: [id number]    : 1960    Date of Service: 2025    Chief Complaint   Patient presents with    Cerebrovascular Accident     Past Medical History:  has no past medical history on file.  Past Surgical History:  has a past surgical history that includes craniotomy (Left, 2025).    Discharge Recommendations  Further therapy recommended at discharge.The patient should be able to tolerate at least 3 hours of therapy per day over 5 days or 15 hours over 7 days.   This patient may benefit from a Physical Medicine and Rehab consult.     OT Equipment Recommendations  Equipment Needed:  (CTA once home set up known. CTA mobilityy device needed)    Assessment  Performance deficits / Impairments: Decreased functional mobility ;Decreased ADL status;Decreased strength;Decreased safe awareness;Decreased cognition;Decreased balance;Decreased vision/visual deficit;Decreased coordination;Decreased posture;Decreased high-level IADLs  Assessment: Pt following ~25% simple commands date with L gaze preferance and no AROM noted to the RUE. 2 assist needed for bed mobility. Max A to maintain midline seated position initally with heavy pushing. Mod A x2 to stand briefly with handheld assist. Hand over hand assist needed to bring towel in LUE to face. Per chart pt was IND prior to admission however no family at bedside to consult. Pt limited by above deficits impacting functional performance. Pt would benefit from continued therapy prior to and following discharge from acute setting to increase safety and IND in self care.  Prognosis: Good  Decision Making: High Complexity  REQUIRES OT FOLLOW-UP: Yes  Activity Tolerance  Activity Tolerance: Treatment limited secondary to decreased cognition  Safety Devices  Type of Devices: Call light within reach;Gait belt;Left in bed;Bed alarm in place;Nurse 
[facility] Neurology   IN-PATIENT SERVICE   [facility]    Progress Note             Date:   2/15/2025  Patient name:  Leland Eduardo II  Date of admission:  1/28/2025  3:33 PM  MRN:   [id number]  Account:  [id number]  YOB: 1960  PCP:    No primary care provider on file.  Room:   [address] [place] [address]  Code Status:    Full Code    Chief Complaint:     Chief Complaint   Patient presents with    Cerebrovascular Accident       Interval hx:     The patient was seen and examined at bedside.      Follows simple commands  Seemed more lethargic ans sensitive to BP   BP dropping with map around 60s  Will give a bolus 1L   Infectious work up, CXR, UA, CRP and ESR     Slight tachycardic overnight   And restlessness   Was given Toradol once      Nephrology signed off   Free water flushed for hypernatremia   Get BMP today     Placement pending     MRI brain outpatient      Brief History of Present Illness:     Leland Eduardo II is a 64-year-old male with a history of HTN, HLD, CAD (s/p stent), CHF, COPD, and thrombocytopenia who was admitted to the Neuro ICU on 1/28/2025 following new-onset right-sided weakness and decreased alertness. Last known well was at 1415 on 1/28, when he was found by his wife slumped over. EMS found him hypertensive () and he was intubated for airway protection, receiving propofol, fentanyl, and Versed during transport. Upon ED arrival, initial imaging showed a large left basal ganglia/thalamic hemorrhage with significant mass effect (1.1 cm midline shift), IVH, and a 90% stenosis at the left ICA. He was started on a Cardene infusion for BP control (goal SBP <160), and 250cc 3% saline bolus was administered. Neurosurgery performed a craniotomy with hematoma evacuation. Post-op, the patient remained intubated and sedated, with some improvement in imaging and clinical status. He was extubated on 1/30, but developed fever and leukocytosis. Empiric antibiotics (Zosyn) 
[facility]  Speech Language Pathology    Date: 2/11/2025  Patient Name: Leland Eduardo II  YOB: 1960   AGE: 64 y.o.  MRN: [id number]        Patient Not Available for Speech Therapy     Due to:  [] Testing  [] Hemodialysis  [] Cancelled by RN  [x] Surgery   [] Intubation/Sedation/Pain Medication  [] Medical instability  [] Other:    Next scheduled treatment: as medically appropriate  Completed by: Leana Brady SLP, M.S. CCC-SLP   
[facility] Neurology   IN-PATIENT SERVICE   [facility]    Progress Note             Date:   2/18/2025  Patient name:  Leland Eduardo II  Date of admission:  1/28/2025  3:33 PM  MRN:   [id number]  Account:  [id number]  YOB: 1960  PCP:    No primary care provider on file.  Room:   [address] [place] [address]  Code Status:    Full Code    Chief Complaint:     Chief Complaint   Patient presents with    Cerebrovascular Accident       Interval hx:     The patient was seen and examined at bedside.      Unchanged neuro exam     Pending placement  On Rocephin for UTI   Culture sensitivity and is pan sensitive   DC today     Brief History of Present Illness:     Leland Eduardo II is a 64-year-old male with a history of HTN, HLD, CAD (s/p stent), CHF, COPD, and thrombocytopenia who was admitted to the Neuro ICU on 1/28/2025 following new-onset right-sided weakness and decreased alertness. Last known well was at 1415 on 1/28, when he was found by his wife slumped over. EMS found him hypertensive () and he was intubated for airway protection, receiving propofol, fentanyl, and Versed during transport. Upon ED arrival, initial imaging showed a large left basal ganglia/thalamic hemorrhage with significant mass effect (1.1 cm midline shift), IVH, and a 90% stenosis at the left ICA. He was started on a Cardene infusion for BP control (goal SBP <160), and 250cc 3% saline bolus was administered. Neurosurgery performed a craniotomy with hematoma evacuation. Post-op, the patient remained intubated and sedated, with some improvement in imaging and clinical status. He was extubated on 1/30, but developed fever and leukocytosis. Empiric antibiotics (Zosyn) were started on 2/1 with immediate improvement in WBC count. By the morning of 2/2, the patient remained stable, with no acute events, but continues to have dense global aphasia, not following commands. Patient has localized movement in the left upper 
Patient to room 102 per stretcher.   
Physical Medicine & Rehabilitation  Progress Note    2/6/2025 9:56 AM     CC: Ambulatory and ADL dysfunction due to large left basal ganglia/thalamic intraparenchymal hemorrhage status post evacuation With dense right hemiparesis and global aphasia and dysphagia    In brief 64-year-old male with history of hypertension, hyperlipidemia, coronary artery disease with stent, CHF presented right-sided weakness decreased alertness.  Noted to have elevated blood pressure found to have left basal ganglia thalamic hemorrhage with significant mass effect and intraventricular hemorrhage 9% stenosis left ICA underwent craniotomy for evacuation.  Eventually extubated 1/30 has dense right hemiparesis PEG tube pending    Neurology blood pressure medicines being adjusted, on tube feeds, plan for PEG this week, hyponatremia on half-normal, leukocytosis question etiology    GI plan for EGD/PEG however on hold today due to leukocytosis    Subjective:   Sleepy though moves left upper and lower extremity    ROS:  Denies fevers, chills, sweats.  No chest pain, palpitations, lightheadedness.  Denies coughing, wheezing or shortness of breath.  Denies abdominal pain, nausea, diarrhea or constipation.  No new areas of joint pain.  Denies new areas of numbness or weakness.  Denies new anxiety or depression issues.  No new skin problems.    Rehabilitation:   PT:    Bed mobility  Rolling to Left: Substantial/Maximal assistance  Rolling to Right: Substantial/Maximal assistance  Supine to Sit: Substantial/Maximal assistance, 2 Person assistance  Sit to Supine: Unable to assess  Scooting: Substantial/Maximal assistance  Bed Mobility Comments: Pt engaged in L/R rolling for brief/bedding mgmt w/ pt initiating reaching w/ LUE to hold bed rails. HOB elevated ~30 degrees for sup>sit w/ pt required MAX A x2 for trunk/RLE progression, pt able to advance LLE off EOB. Pt retired to chair.    Transfers  Sit to Stand: Substantial/Maximal assistance, 2 Person 
Physical Medicine & Rehabilitation  Progress Note    2/7/2025 1:26 PM     CC: Ambulatory and ADL dysfunction due to large left basal ganglia/thalamic intraparenchymal hemorrhage status post evacuation With dense right hemiparesis and global aphasia and dysphagia    In brief 64-year-old male with history of hypertension, hyperlipidemia, coronary artery disease with stent, CHF presented right-sided weakness decreased alertness.  Noted to have elevated blood pressure found to have left basal ganglia thalamic hemorrhage with significant mass effect and intraventricular hemorrhage 9% stenosis left ICA underwent craniotomy for evacuation.  Eventually extubated 1/30 has dense right hemiparesis PEG tube pending    Neurology left hemispheric intracranial hemorrhage status post craniotomy and hematoma evacuation likely hypertensive related, on NG tube pending PEG, ID consulted for CT of sinuses    GI plan for EGD/PEG however on hold today due to leukocytosis    Neurosurgery CTh concerning for worsening MLS with evolving left MCA infarct    Subjective:   Sleepy though moves left upper and lower extremity    ROS:  Difficult to obtain, minimally following commands    Rehabilitation:   PT:    Bed mobility  Rolling to Left: Dependent/Total  Rolling to Right: Substantial/Maximal assistance  Supine to Sit: Substantial/Maximal assistance, 2 Person assistance  Sit to Supine: Substantial/Maximal assistance, 2 Person assistance  Scooting: Substantial/Maximal assistance  Bed Mobility Comments: HOB elevated ~35 degrees, pt required maxA for trunk and LE progression throughout. Pt required max/totalA for rolling.    Transfers  Sit to Stand: Unable to assess  Stand to Sit: Unable to assess  Bed to Chair: Dependent/Total (Lynn steady)  Comment: Unsafe to attempt transfers this date d/t poor sitting balance/tolerance.    Ambulation  Distance: unable to assess  Comments: Unsafe/unable to attempt.  More Ambulation?: No                OT:   
Physical Therapy        Physical Therapy Cancel Note      DATE: 2/3/2025    NAME: Leland Eduardo II  MRN: [id number]   : 1960      Patient not seen this date for Physical Therapy due to:    Testing: Pt currently off the floor for swallow study, will continue to check back as able.       Electronically signed by SKYLER Duncan on 2/3/2025 at 2:09 PM      
Physical Therapy        Physical Therapy Cancel Note      DATE: 2025    NAME: Leland Eduardo II  MRN: [id number]   : 1960      Patient not seen this date for Physical Therapy due to:    Testing: Pt currently off the floor for CT scan, will check back as able.       Electronically signed by Wanda Gardiner PTA on 2025 at 11:57 AM      
Physical Therapy        Physical Therapy Cancel Note      DATE: 2025    NAME: Leland Eduardo II  MRN: [id number]   : 1960      Patient not seen this date for Physical Therapy due to:    Testing: Pt currently off the floor for CT scan, will continue to check back as appropriate.       Electronically signed by Wanda Gardiner PTA on 2025 at 11:33 AM      
Physical Therapy        Physical Therapy Cancel Note      DATE: 2025    NAME: Leland Eduardo II  MRN: [id number]   : 1960      Patient not seen this date for Physical Therapy due to:    Patient is not appropriate for PT evaluation/treatment at this time d/t intubated/sedated      Electronically signed by Tanisha Luna PT on 2025 at 12:23 PM      
Physical Therapy  Facility/Department: [address] [facility]   Physical Therapy Daily Treatment Note    Patient Name: Leland Eduardo II        MRN: [id number]    : 1960    Date of Service: 2025    Chief Complaint   Patient presents with    Cerebrovascular Accident     Past Medical History:  has no past medical history on file.  Past Surgical History:  has a past surgical history that includes craniotomy (Left, 2025).    Discharge Recommendations  Discharge Recommendations: Patient would benefit from continued therapy after discharge  PT Equipment Recommendations  Equipment Needed: No  Other: Pt currently unsafe to attempt mobility unassisted.    Assessment  Body Structures, Functions, Activity Limitations Requiring Skilled Therapeutic Intervention: Decreased functional mobility ;Decreased strength;Decreased safe awareness;Decreased endurance;Decreased balance;Decreased high-level IADLs  Assessment: Pt requires maxA x2 to perform bed mobility and tolerated sitting EOB ~20 minutes with maxA/modA. Pt demonstrates significant R side weakness with poor attention to RUE and RLE. Recommending continued skilled physical therapy to address these deficits and maximize safety and independence with mobility.  Therapy Prognosis: Fair  Activity Tolerance  Activity Tolerance: Treatment limited secondary to decreased cognition;Patient limited by endurance  Safety Devices  Type of Devices: Call light within reach;Nurse notified;Gait belt;Patient at risk for falls;Bed alarm in place;Left in bed;All fall risk precautions in place  Restraints  Restraints Initially in Place: No  Restraints: FDS    AM-PAC  AM-PAC Basic Mobility - Inpatient   How much help is needed turning from your back to your side while in a flat bed without using bedrails?: Total  How much help is needed moving from lying on your back to sitting on the side of a flat bed without using bedrails?: Total  How much help is needed moving to and from a bed to a 
Physical Therapy  Facility/Department: [address] [facility]   Physical Therapy Daily Treatment Note    Patient Name: Leland Eduardo II        MRN: [id number]    : 1960    Date of Service: 2025    Chief Complaint   Patient presents with    Cerebrovascular Accident     Past Medical History:  has no past medical history on file.  Past Surgical History:  has a past surgical history that includes craniotomy (Left, 2025).    Discharge Recommendations  Discharge Recommendations: Patient would benefit from continued therapy after discharge  PT Equipment Recommendations  Equipment Needed: No  Other: Pt currently unsafe to attempt mobility unassisted.    Assessment  Body Structures, Functions, Activity Limitations Requiring Skilled Therapeutic Intervention: Decreased functional mobility ;Decreased strength;Decreased safe awareness;Decreased endurance;Decreased balance;Decreased high-level IADLs  Assessment: Pt presenting with mobility deficits requiring mod/maxA x 2 to perform bed mobility. Pt able to maintain sitting EOB ~23 minutes with maxA for sitting balance. Pt is most limited by R sided deficits and decreased endurance. Pt would benefit from continued therapy to address deficits and maximize safety and independence with mobility.  Therapy Prognosis: Fair  Requires PT Follow-Up: Yes  Activity Tolerance  Activity Tolerance: Treatment limited secondary to decreased cognition;Patient limited by pain;Patient limited by endurance  Safety Devices  Type of Devices: Call light within reach;Left in bed;Nurse notified;Gait belt;Bed alarm in place;Patient at risk for falls;All fall risk precautions in place  Restraints  Restraints Initially in Place: No  Restraints: .    AM-PAC  AM-PAC Basic Mobility - Inpatient   How much help is needed turning from your back to your side while in a flat bed without using bedrails?: Total  How much help is needed moving from lying on your back to sitting on the side of a flat bed 
Physical Therapy  Facility/Department: [address] [facility] ICU  Physical Therapy Initial Assessment    Name: Leland Eduardo II  : 1960  MRN: [id number]  Date of Service: 2025    Discharge Recommendations:  Patient would benefit from continued therapy after discharge   PT Equipment Recommendations  Equipment Needed: No    Per chart-  A CT head without contrast revealed a large acute left basal ganglia/thalamus intraparenchymal hemorrhage extending into the left frontal lobe, with associated vasogenic edema and a mass effect (1.1 cm midline shift), along with intraventricular extension into the lateral, third, and fourth ventricles, causing mild dilation of the left lateral ventricle more than the right.     64 y.o. male who presents with intracranial hemorrhage.  s/p: craniotomy for hematoma evacuation       Patient Diagnosis(es): The primary encounter diagnosis was Hemorrhagic stroke (HCC). A diagnosis of Hypertensive emergency was also pertinent to this visit.  Past Medical History:  has no past medical history on file.  Past Surgical History:  has a past surgical history that includes craniotomy (Left, 2025).    Assessment  Body Structures, Functions, Activity Limitations Requiring Skilled Therapeutic Intervention: Decreased functional mobility ;Decreased strength;Decreased safe awareness;Decreased endurance;Decreased balance;Decreased high-level IADLs  Assessment: Pt with maxAx 2 for bed mobility and modA x 2 for sit to stand transfer. Initially heavily pushing with LUE, improved with education and repostioning. Pt is unsafe to return to previous living situation due to limited mobility and strength.  Pt will benefit from continued therapy to improve deficits and progress function.  Therapy Prognosis: Good  Decision Making: Medium Complexity  Requires PT Follow-Up: Yes  Activity Tolerance  Activity Tolerance: Treatment limited secondary to decreased cognition;Patient tolerated treatment 
Physical Therapy  Facility/Department: [address] [facility]   Physical Therapy Daily Treatment Note    Patient Name: Leland Eduardo II        MRN: [id number]    : 1960    Date of Service: 2025    Chief Complaint   Patient presents with    Cerebrovascular Accident     Past Medical History:  has no past medical history on file.  Past Surgical History:  has a past surgical history that includes craniotomy (Left, 2025).    Discharge Recommendations  Discharge Recommendations: Patient would benefit from continued therapy after discharge  PT Equipment Recommendations  Equipment Needed: No  Other: Pt currently unsafe to attempt mobility unassisted.    Assessment  Body Structures, Functions, Activity Limitations Requiring Skilled Therapeutic Intervention: Decreased functional mobility ;Decreased strength;Decreased safe awareness;Decreased endurance;Decreased balance;Decreased high-level IADLs  Assessment: Pt continues to grossly requiring maxAx2 to perform all mobility, layton steady transferred to bedside chair this date. Pt demonstrates significicant R side weakness with poor attenuation noted to RUE and RLE. Recommending continued skilled physical therapy to address these deficits and return pt to prior level of function.  Therapy Prognosis: Fair  Requires PT Follow-Up: Yes  Activity Tolerance  Activity Tolerance: Treatment limited secondary to decreased cognition;Patient limited by endurance  Safety Devices  Type of Devices: Call light within reach;Nurse notified;Gait belt;Patient at risk for falls;All fall risk precautions in place;Left in chair;Chair alarm in place  Restraints  Restraints Initially in Place: No    AM-PAC  AM-PAC Basic Mobility - Inpatient   How much help is needed turning from your back to your side while in a flat bed without using bedrails?: Total  How much help is needed moving from lying on your back to sitting on the side of a flat bed without using bedrails?: Total  How much help is 
Physical Therapy  Facility/Department: [address] [facility]   Physical Therapy Daily Treatment Note    Patient Name: Leland Eduardo II        MRN: [id number]    : 1960    Date of Service: 2025    Chief Complaint   Patient presents with    Cerebrovascular Accident     Past Medical History:  has no past medical history on file.  Past Surgical History:  has a past surgical history that includes craniotomy (Left, 2025); Esophagogastroduodenoscopy w/ PEG (2025); and Upper gastrointestinal endoscopy (N/A, 2025).    Discharge Recommendations  Discharge Recommendations: Patient would benefit from continued therapy after discharge  PT Equipment Recommendations  Equipment Needed: No  Other: Pt currently unsafe to attempt mobility unassisted.    Assessment  Body Structures, Functions, Activity Limitations Requiring Skilled Therapeutic Intervention: Decreased functional mobility ;Decreased strength;Decreased safe awareness;Decreased endurance;Decreased balance;Decreased high-level IADLs  Assessment: Pt requires maxA x2 to perform supine<>sit transfer and tolerated sitting EOB ~20 minutes with maxA with intermitten modA. Pt completed x 2 sit<>stands requiring maxA x 2, 15 seconds. Pt demonstrates significant R sided weakness with poor attention to RUE and RLE. Recommending continued skilled physical therapy to address these deficits and maximize safety and independence with mobility.  Therapy Prognosis: Fair  Activity Tolerance  Activity Tolerance: Treatment limited secondary to decreased cognition;Patient limited by endurance  Safety Devices  Type of Devices: Nurse notified;Call light within reach;Bed alarm in place;Patient at risk for falls;Left in bed;All fall risk precautions in place;Heels elevated for pressure relief  Restraints  Restraints Initially in Place: No    AM-PAC  AM-PAC Basic Mobility - Inpatient   How much help is needed turning from your back to your side while in a flat bed without 
Physical Therapy  Facility/Department: [address] [facility]   Physical Therapy Daily Treatment Note    Patient Name: Leland Eduardo II        MRN: [id number]    : 1960    Date of Service: 2025    Chief Complaint   Patient presents with    Cerebrovascular Accident     Past Medical History:  has no past medical history on file.  Past Surgical History:  has a past surgical history that includes craniotomy (Left, 2025).    Discharge Recommendations  Discharge Recommendations: Patient would benefit from continued therapy after discharge  PT Equipment Recommendations  Equipment Needed: No  Other: Pt currently unsafe to attempt mobility unassisted.    Assessment  Body Structures, Functions, Activity Limitations Requiring Skilled Therapeutic Intervention: Decreased functional mobility ;Decreased strength;Decreased safe awareness;Decreased endurance;Decreased balance;Decreased high-level IADLs  Assessment: Pt requires maxA x2 to perform bed mobility and tolerated sitting EOB ~20 minutes with maxA. Pt demonstrates significant R side weakness with poor attention to RUE and RLE. Recommending continued skilled physical therapy to address these deficits and maximize safety and independence with mobility.  Therapy Prognosis: Fair  Requires PT Follow-Up: Yes  Activity Tolerance  Activity Tolerance: Treatment limited secondary to decreased cognition;Patient limited by endurance  Safety Devices  Type of Devices: Call light within reach;Nurse notified;Gait belt;Patient at risk for falls;Bed alarm in place;Left in bed;All fall risk precautions in place  Restraints  Restraints Initially in Place: No    AM-PAC  AM-PAC Basic Mobility - Inpatient   How much help is needed turning from your back to your side while in a flat bed without using bedrails?: Total  How much help is needed moving from lying on your back to sitting on the side of a flat bed without using bedrails?: Total  How much help is needed moving to and from a bed 
[facility]  Speech Language Pathology    Date: 2/7/2025  Patient Name: Leland Eduardo II  YOB: 1960   AGE: 64 y.o.  MRN: [id number]        Patient Not Available for Speech Therapy     Due to:  [] Testing  [] Hemodialysis  [] Cancelled by RN  [] Surgery   [] Intubation/Sedation/Pain Medication  [] Medical instability  [x] Other:  pt off floor at CT    Next scheduled treatment: 2/10/25  Completed by: Leana Brady, SLP, M.S. CCC-SLP    
RN left a telephone message for patient's wife, Marylou, to return call to complete MRI screening form.  
RN notified Dr. DELPHINE Bravo that \"Norvasc 10 mg, HCTZ 12.5 mg, and Lisinopril 20 mg were held so far today. BP currently 106/72 . Would you like to adjust BP meds?    Response 2/9/25 1:15 PM  \"Keep hold\"    RN GRACE Bravo \"Would you like to place that hold order or call me with a verbal [phone]?     Response \"Will address shortly\"  
Report called to [facility]. IV removed, EMS here to transport patient.    
Speech Language Pathology  [facility]    Speech Language Treatment Note    Date: 2/4/2025  Patient’s Name: Leland Eduardo II  MRN: [id number]  Diagnosis:   Patient Active Problem List   Diagnosis Code    Intracranial hemorrhage (HCC) I62.9    Hemorrhagic stroke (HCC) I61.9    Hypertensive emergency I16.1    Cerebral edema (HCC) G93.6    Acute respiratory failure with hypoxia J96.01    Aspiration pneumonia (HCC) J69.0       Pain: 0/10    Speech and Language Treatment  Treatment time: 841-850    Subjective: [x] Alert [x] Cooperative     [] Confused     [] Agitated    [] Lethargic      Objective/Assessment:  Auditory Comprehension: simple commands 5/5 independent.  Pt did not follow oral motor commands.  Yes/no questions: pt did not respond to yes no questions verbally or with gestures.    Verbal Expression: Pt did not verbalize /a/ with max cues.  Automatics 0% with max cues.  Repetition 0% with max cues.  Verbalizing bio information 0% with max cues.  Nursery rhymes 0/2 with max cues    Plan:  [x] Continue ST services    [] Discharge from ST:      Discharge recommendations: []  Further therapy recommended at discharge.The patient should be able to tolerate at least 3 hours of therapy per day over 5 days or 15 hours over 7 days. [x] Further therapy recommended at discharge.   [] No therapy recommended at discharge.      Treatment completed by: Leana Brady, SLP, M.S. JANIE-SLP    
Speech Language Pathology  [facility]    Speech Language Treatment Note    Date: 2/3/2025  Patient’s Name: Leland Eduardo II  MRN: [id number]  Diagnosis:   Patient Active Problem List   Diagnosis Code    Intracranial hemorrhage (HCC) I62.9    Hemorrhagic stroke (HCC) I61.9    Hypertensive emergency I16.1    Cerebral edema (HCC) G93.6    Acute respiratory failure with hypoxia J96.01    Aspiration pneumonia (HCC) J69.0       Pain: 0/10    Speech and Language Treatment  Treatment time: 846-912    Subjective: [x] Alert [x] Cooperative     [] Confused     [] Agitated    [] Lethargic      Objective/Assessment:  Auditory Comprehension: simple commands 5/6 increased to 6/6 with mod cues.  Yes/no questions: pt did not respond to yes no questions verbally or with gestures.    Verbal Expression: Pt verbalized /a/ X2 with max cues.  Automatics 0% with max cues.  Repetition 0% with max cues.  Verbalizing bio information 0% with max cues.  Pt appeared to mouth \"ok\" x2 and the number one X1    Dysphagia: pt alert, awake and cooperative.  Pt provided with nectar thick trials via spoon with no overt s/s of aspiration noted.  Pt did not follow commands for O/M movements. Spoke with RN recommend MBSS completion. Pt. Currently with NG tube    Plan:  [x] Continue ST services    [] Discharge from ST:      Discharge recommendations: []  Further therapy recommended at discharge.The patient should be able to tolerate at least 3 hours of therapy per day over 5 days or 15 hours over 7 days. [x] Further therapy recommended at discharge.   [] No therapy recommended at discharge.      Treatment completed by: Leana Brady, SLP, M.S. CCC-SLP    
Speech Language Pathology  [facility]    Speech Language Treatment Note    Date: 2/18/2025  Patient’s Name: Leland Eduardo II  MRN: [id number]  Diagnosis:   Patient Active Problem List   Diagnosis Code    Intracranial hemorrhage (HCC) I62.9    Hemorrhagic stroke (HCC) I61.9    Hypertensive emergency I16.1    Cerebral edema (HCC) G93.6    Acute respiratory failure with hypoxia J96.01    Aspiration pneumonia (HCC) J69.0    Severe malnutrition (HCC) E43    Oropharyngeal dysphagia R13.12    Fever R50.9    Bandemia D72.825    Thalamic hemorrhage (HCC) I61.0    IVH (intraventricular hemorrhage) (HCC) I61.5    Midline shift of brain R90.89    Hypernatremia E87.0    Hypotension I95.9       Pain: 0/10    Speech and Language Treatment  Treatment time: 4715-1143    Subjective: [x] Alert [x] Cooperative     [] Confused     [] Agitated    [] Lethargic      Objective/Assessment:  Auditory Comprehension: -Yes/No Questions: 1/6 no increased with max cues.  Pt nodded head yes X1    -Following commands: 1/7 no increase with max cues      Verbal Expression: -Phonation: attempted phonation of \"ah\" \"oo\" \"ee\", pt with apparent groan but no true vowel and/or consonant production.  Automatics nursery rhymes 0/4 with max cues.      Pt was looking at a document left at his bedside and handed it to SLP and shrugged shoulders.      Plan:  [x] Continue ST services    [] Discharge from ST:      Discharge recommendations: []  Further therapy recommended at discharge.The patient should be able to tolerate at least 3 hours of therapy per day over 5 days or 15 hours over 7 days. [x] Further therapy recommended at discharge.   [] No therapy recommended at discharge.    NATALIE STILL MS, CCC/SLP        
Speech Language Pathology  [facility]    Speech Language Treatment Note    Date: 2/12/2025  Patient’s Name: Leland Eduardo II  MRN: [id number]  Diagnosis:   Patient Active Problem List   Diagnosis Code    Intracranial hemorrhage (HCC) I62.9    Hemorrhagic stroke (HCC) I61.9    Hypertensive emergency I16.1    Cerebral edema (HCC) G93.6    Acute respiratory failure with hypoxia J96.01    Aspiration pneumonia (HCC) J69.0    Severe malnutrition (HCC) E43    Oropharyngeal dysphagia R13.12    Fever R50.9    Bandemia D72.825    Thalamic hemorrhage (HCC) I61.0    IVH (intraventricular hemorrhage) (HCC) I61.5    Midline shift of brain R90.89       Pain: 0/10 pt resting comfortably in bed at ST arrival.     Speech and Language Treatment  Treatment time: 5600-7051    Subjective: [x] Alert [] Cooperative     [] Confused     [] Agitated    [] Lethargic      Objective/Assessment:  Auditory Comprehension: -Yes/No Questions using visuals/picture cards: 0/10 no verbal and/or gestural response.     Verbal Expression: -Automatic speech (counting 1-10): unable to complete despite visual aid on communication baord,  some phonation heard \"ah\" x1.     -Automatic speech (songs): ABC with visual aid, happy birthday and twinkle twinkle attempted.    Other: Pt remains globally aphasic at this time.     Plan:  [x] Continue ST services    [] Discharge from ST:      Discharge recommendations: []  Further therapy recommended at discharge.The patient should be able to tolerate at least 3 hours of therapy per day over 5 days or 15 hours over 7 days. [x] Further therapy recommended at discharge.   [] No therapy recommended at discharge.      Treatment completed by: Wanda Burton M.A., CCC-SLP       
Speech Language Pathology  [facility]    Speech Language Treatment Note    Date: 2/14/2025  Patient’s Name: Leland Eduardo II  MRN: [id number]  Diagnosis:   Patient Active Problem List   Diagnosis Code    Intracranial hemorrhage (HCC) I62.9    Hemorrhagic stroke (HCC) I61.9    Hypertensive emergency I16.1    Cerebral edema (HCC) G93.6    Acute respiratory failure with hypoxia J96.01    Aspiration pneumonia (HCC) J69.0    Severe malnutrition (HCC) E43    Oropharyngeal dysphagia R13.12    Fever R50.9    Bandemia D72.825    Thalamic hemorrhage (HCC) I61.0    IVH (intraventricular hemorrhage) (HCC) I61.5    Midline shift of brain R90.89    Hypernatremia E87.0       Pain: 0/10    Speech and Language Treatment  Treatment time: 4477-2024    Subjective: [x] Alert [x] Cooperative     [] Confused     [] Agitated    [] Lethargic      Objective/Assessment:  Auditory Comprehension: -Yes/No Questions: Using yes/no picture cards 1/10 answered appropriately using left hand to select answer.     -Following commands: followed commands to squeeze ball given number and visual aid. (squeezed 1-6x)    -ST stated \"bye\" when leaving, PT grabbed ST arm and shaking with smile.     Verbal Expression: -Phonation: attempted phonation of \"ah\" \"oo\" \"ee\", pt with apparent groan but no true vowel and/or consonant production.        Other: Pt in chair with call light within reach. Wife at bedside at end of session.     Plan:  [] Continue ST services    [] Discharge from ST:      Discharge recommendations: []  Further therapy recommended at discharge.The patient should be able to tolerate at least 3 hours of therapy per day over 5 days or 15 hours over 7 days. [x] Further therapy recommended at discharge.   [] No therapy recommended at discharge.    Treatment completed by: Wanda Burton M.A., CCC-SLP       
Speech Language Pathology  [facility]    Speech Language Treatment Note    Date: 2/15/2025  Patient’s Name: Leland Eduardo II  MRN: [id number]  Diagnosis:   Patient Active Problem List   Diagnosis Code    Intracranial hemorrhage (HCC) I62.9    Hemorrhagic stroke (HCC) I61.9    Hypertensive emergency I16.1    Cerebral edema (HCC) G93.6    Acute respiratory failure with hypoxia J96.01    Aspiration pneumonia (HCC) J69.0    Severe malnutrition (HCC) E43    Oropharyngeal dysphagia R13.12    Fever R50.9    Bandemia D72.825    Thalamic hemorrhage (HCC) I61.0    IVH (intraventricular hemorrhage) (HCC) I61.5    Midline shift of brain R90.89    Hypernatremia E87.0       Pain: 0/10    Speech and Language Treatment  Treatment time: 3086-1048    Subjective: [] Alert [x] Cooperative     [] Confused     [] Agitated    [x] Lethargic      Objective/Assessment:  Auditory Comprehension: 1 step commands: 3/5 increased to 5/5 with mod verbal/visual/tactile cues    Yes/no simple: 0/6 not increased with max verbal/visual cues, models    Verbal Expression: imitation of vowel phonemes: no true vowel and/or consonant production, occasional moaning however suspect limited intentionality.     Automatic speech: counting and simple rhymes/songs: 0/4 not increased with phonemic/verbal cues or models.    Other: Session limited by pt decreased alertness. Spoke with RN, plan for repeat CT this date.    Plan:  [x] Continue ST services    [] Discharge from ST:      Discharge recommendations: []  Further therapy recommended at discharge.The patient should be able to tolerate at least 3 hours of therapy per day over 5 days or 15 hours over 7 days. [x] Further therapy recommended at discharge.   [] No therapy recommended at discharge.      Treatment completed by: Jyoti Phipps, SLP, M.S. JANIE-SLP    
Speech Language Pathology  [facility]    Speech Language Treatment Note    Date: 2/5/2025  Patient’s Name: Leland Eduardo II  MRN: [id number]  Diagnosis:   Patient Active Problem List   Diagnosis Code    Intracranial hemorrhage (HCC) I62.9    Hemorrhagic stroke (HCC) I61.9    Hypertensive emergency I16.1    Cerebral edema (HCC) G93.6    Acute respiratory failure with hypoxia J96.01    Aspiration pneumonia (HCC) J69.0    Severe malnutrition (HCC) E43       Pain: 0/10    Speech and Language Treatment  Treatment time: 850-141    Subjective: [x] Alert [x] Cooperative     [] Confused     [] Agitated    [] Lethargic      Objective/Assessment:  Auditory Comprehension: simple commands 3/5 independent increased to 4/5 with max cues.  Pt did not follow oral motor commands.  Yes/no questions: pt did not respond to yes no questions verbally or with gestures or via pointing at yes/no picture cards. Pt would point at card, however would point at yes and no with each question.    Verbal Expression: Pt did verbalize /a/  X2 with max cues.  Automatics 0% with max cues.  Repetition 0% with max cues.  Verbalizing bio information 0% with max cues.  Nursery rhymes 0/2 with max cues    Plan:  [x] Continue ST services    [] Discharge from ST:      Discharge recommendations: []  Further therapy recommended at discharge.The patient should be able to tolerate at least 3 hours of therapy per day over 5 days or 15 hours over 7 days. [x] Further therapy recommended at discharge.   [] No therapy recommended at discharge.      Treatment completed by: Leana Brady, KARIN, M.S. JANIE-SLP    
Speech Language Pathology  [facility]    Speech Language Treatment Note    Date: 2/10/2025  Patient’s Name: Leland Eduardo II  MRN: [id number]  Diagnosis:   Patient Active Problem List   Diagnosis Code    Intracranial hemorrhage (HCC) I62.9    Hemorrhagic stroke (HCC) I61.9    Hypertensive emergency I16.1    Cerebral edema (HCC) G93.6    Acute respiratory failure with hypoxia J96.01    Aspiration pneumonia (HCC) J69.0    Severe malnutrition (HCC) E43    Oropharyngeal dysphagia R13.12    Fever R50.9    Bandemia D72.825    Thalamic hemorrhage (HCC) I61.0    IVH (intraventricular hemorrhage) ([facility]) I61.5    Midline shift of brain R90.89       Pain: 0/10 did not appear in pain. No facial grimacing and/or grunting.     Speech and Language Treatment  Treatment time: 9811-3186    Subjective: [x] Alert [x] Cooperative     [] Confused     [] Agitated    [] Lethargic      Objective/Assessment:  Auditory Comprehension: -Simple yes/no questions with cards: 0/10, pt did not point to cards.     -Stress ball: ST passing stress ball to squeeze back and forth between pt, ST stating \"squeeze 3X, 4X, now 5X\" pt was able to complete and pass ball back to ST each time.     Verbal Expression: While ST completing pt oral care via moistened toothettes, ST asking \"do you want me to do more\" pt with voicing of \"luiz\", ST suspected this for production of \"more\", however when ST brought toothette to pts mouth, pt pushed ST hand away.     Other: Oral care completed via moistened to toothettes pt with phlegm like secretions on palatal, lingual and dental surfaces. Once session completed, pt reclined in bed with call light at side.     Plan:  [x] Continue ST services    [] Discharge from ST:      Discharge recommendations: []  Further therapy recommended at discharge.The patient should be able to tolerate at least 3 hours of therapy per day over 5 days or 15 hours over 7 days. [x] Further therapy recommended at discharge.   [] 
Speech Language Pathology  [facility]    Speech Language Treatment Note    Date: 2025  Patient’s Name: Leland Eduardo II  MRN: [id number]  Diagnosis:   Patient Active Problem List   Diagnosis Code    Intracranial hemorrhage (HCC) I62.9    Hemorrhagic stroke (HCC) I61.9    Hypertensive emergency I16.1    Cerebral edema (HCC) G93.6    Acute respiratory failure with hypoxia J96.01    Aspiration pneumonia (HCC) J69.0    Severe malnutrition (HCC) E43    Oropharyngeal dysphagia R13.12    Fever R50.9    Bandemia D72.825    Thalamic hemorrhage (HCC) I61.0    IVH (intraventricular hemorrhage) (HCC) I61.5    Midline shift of brain R90.89       Pain: 0/10    Speech and Language Treatment  Treatment time: 8214-1732    Subjective: [x] Alert [x] Cooperative     [] Confused     [] Agitated    [x] Lethargic      Objective/Assessment:  Auditory Comprehension: -1-step commands: 3/5 given repetition and visual cues. Ability to utilize L side.     -Pt unable to follow complex commands for completion of dysphagia exercise program.     -Simple Yes/No questions: 0/5 despite max cues and visual aid.     Verbal Expression: -Automatic speech: Nursery rhyme 0/2 (happy birthday, row your boat), numbers (1-3): 0/3 despite visual aid/communication board    -Confrontational picture namin/3    Other: Pt with increased lethargy at end of session, ST discontinued at this time. Pt continues to present with severe global aphasia.     Plan:  [x] Continue ST services    [] Discharge from ST:      Discharge recommendations: []  Further therapy recommended at discharge.The patient should be able to tolerate at least 3 hours of therapy per day over 5 days or 15 hours over 7 days. [x] Further therapy recommended at discharge.   [] No therapy recommended at discharge.      Treatment completed by: Wanda Burton M.A., CCC-SLP       
Speech Language Pathology  [facility]    Speech Language Treatment Note    Date: 2/17/2025  Patient’s Name: Leland Eduardo II  MRN: [id number]  Diagnosis:   Patient Active Problem List   Diagnosis Code    Intracranial hemorrhage (HCC) I62.9    Hemorrhagic stroke (HCC) I61.9    Hypertensive emergency I16.1    Cerebral edema (HCC) G93.6    Acute respiratory failure with hypoxia J96.01    Aspiration pneumonia (HCC) J69.0    Severe malnutrition (HCC) E43    Oropharyngeal dysphagia R13.12    Fever R50.9    Bandemia D72.825    Thalamic hemorrhage (HCC) I61.0    IVH (intraventricular hemorrhage) (HCC) I61.5    Midline shift of brain R90.89    Hypernatremia E87.0    Hypotension I95.9       Pain: 0/10    Speech and Language Treatment  Treatment time: 857-908    Subjective: [x] Alert [x] Cooperative     [] Confused     [] Agitated    [] Lethargic      Objective/Assessment:  Auditory Comprehension: -Yes/No Questions: 0/5 no increased with max cues    -Following commands: 1/5 mo increased with max cues      Verbal Expression: -Phonation: attempted phonation of \"ah\" \"oo\" \"ee\", pt with apparent groan but no true vowel and/or consonant production.  Automatics 0/3 with max cues      Plan:  [x] Continue ST services    [] Discharge from ST:      Discharge recommendations: []  Further therapy recommended at discharge.The patient should be able to tolerate at least 3 hours of therapy per day over 5 days or 15 hours over 7 days. [x] Further therapy recommended at discharge.   [] No therapy recommended at discharge.    NATALIE STILL MS, CCC/SLP        
[facility] Neurology   IN-PATIENT SERVICE   [facility]    Progress Note             Date:   2/6/2025  Patient name:  Leland Eduardo II  Date of admission:  1/28/2025  3:33 PM  MRN:   [id number]  Account:  [id number]  YOB: 1960  PCP:    No primary care provider on file.  Room:   [facility] OR [place] RM/NONE  Code Status:    Full Code    Chief Complaint:     Chief Complaint   Patient presents with    Cerebrovascular Accident       Interval hx:     The patient was seen and examined at bedside.  Neurologic exam unchanged from yesterday-severely aphasic, following commands with the left upper and lower extremities, withdrawing to pain in the right extremities.       KUB, UA, CXR unremarkable; F/u repsiraotry panel, CT AP d/t abd tenderness and ID consult  IR guided PEG per GI         Brief History of Present Illness:     Leland Eduardo II is a 64-year-old male with a history of HTN, HLD, CAD (s/p stent), CHF, COPD, and thrombocytopenia who was admitted to the Neuro ICU on 1/28/2025 following new-onset right-sided weakness and decreased alertness. Last known well was at 1415 on 1/28, when he was found by his wife slumped over. EMS found him hypertensive () and he was intubated for airway protection, receiving propofol, fentanyl, and Versed during transport. Upon ED arrival, initial imaging showed a large left basal ganglia/thalamic hemorrhage with significant mass effect (1.1 cm midline shift), IVH, and a 90% stenosis at the left ICA. He was started on a Cardene infusion for BP control (goal SBP <160), and 250cc 3% saline bolus was administered. Neurosurgery performed a craniotomy with hematoma evacuation. Post-op, the patient remained intubated and sedated, with some improvement in imaging and clinical status. He was extubated on 1/30, but developed fever and leukocytosis. Empiric antibiotics (Zosyn) were started on 2/1 with immediate improvement in WBC count. By the morning of 
[facility] Neurology   IN-PATIENT SERVICE   [facility]    Progress Note             Date:   2/11/2025  Patient name:  Leland Eduardo II  Date of admission:  1/28/2025  3:33 PM  MRN:   [id number]  Account:  [id number]  YOB: 1960  PCP:    No primary care provider on file.  Room:   [address] [place] [address]  Code Status:    Full Code    Chief Complaint:     Chief Complaint   Patient presents with    Cerebrovascular Accident       Interval hx:     No acute events.  Underwent PEG tube placement with GI.  Appears more awake today.    Brief History of Present Illness:     Leland Eduardo II is a 64-year-old male with a history of HTN, HLD, CAD (s/p stent), CHF, COPD, and thrombocytopenia who was admitted to the Neuro ICU on 1/28/2025 following new-onset right-sided weakness and decreased alertness. Last known well was at 1415 on 1/28, when he was found by his wife slumped over. EMS found him hypertensive () and he was intubated for airway protection, receiving propofol, fentanyl, and Versed during transport. Upon ED arrival, initial imaging showed a large left basal ganglia/thalamic hemorrhage with significant mass effect (1.1 cm midline shift), IVH, and a 90% stenosis at the left ICA. He was started on a Cardene infusion for BP control (goal SBP <160), and 250cc 3% saline bolus was administered. Neurosurgery performed a craniotomy with hematoma evacuation. Post-op, the patient remained intubated and sedated, with some improvement in imaging and clinical status. He was extubated on 1/30, but developed fever and leukocytosis. Empiric antibiotics (Zosyn) were started on 2/1 with immediate improvement in WBC count. By the morning of 2/2, the patient remained stable, with no acute events, but continues to have dense global aphasia, not following commands. Patient has localized movement in the left upper extremity and bilateral lower extremities, but no movement in the right upper extremity. 
[facility] Neurology   IN-PATIENT SERVICE   [facility]    Progress Note             Date:   2/9/2025  Patient name:  Leland Eduardo II  Date of admission:  1/28/2025  3:33 PM  MRN:   [id number]  Account:  [id number]  YOB: 1960  PCP:    No primary care provider on file.  Room:   [address] [place] [address]  Code Status:    Full Code    Chief Complaint:     Chief Complaint   Patient presents with    Cerebrovascular Accident       Interval hx:     The patient was seen and examined at bedside.  Blood pressure within goal less than 140, remains severely aphasic, following commands with the left upper and lower extremities, withdrawing to pain in the right extremities.  ID concerning of bandemia, was febrile yesterday on 1 reading.  Sodium 150, patient of feeding since 2/4, will start tube feeding and n.p.o. midnight for possible IR PEG tube placement tomorrow morning, follow sodium Q8, 3 water flush 100 every 6   White blood cell 18.5 trending down, ID on board        Brief History of Present Illness:     Leland Eduardo II is a 64-year-old male with a history of HTN, HLD, CAD (s/p stent), CHF, COPD, and thrombocytopenia who was admitted to the Neuro ICU on 1/28/2025 following new-onset right-sided weakness and decreased alertness. Last known well was at 1415 on 1/28, when he was found by his wife slumped over. EMS found him hypertensive () and he was intubated for airway protection, receiving propofol, fentanyl, and Versed during transport. Upon ED arrival, initial imaging showed a large left basal ganglia/thalamic hemorrhage with significant mass effect (1.1 cm midline shift), IVH, and a 90% stenosis at the left ICA. He was started on a Cardene infusion for BP control (goal SBP <160), and 250cc 3% saline bolus was administered. Neurosurgery performed a craniotomy with hematoma evacuation. Post-op, the patient remained intubated and sedated, with some improvement in imaging and clinical 
[facility] Neurology   IN-PATIENT SERVICE   [facility]    Progress Note             Date:   2/12/2025  Patient name:  Leland Eduardo II  Date of admission:  1/28/2025  3:33 PM  MRN:   [id number]  Account:  [id number]  YOB: 1960  PCP:    No primary care provider on file.  Room:   [address] [place] [address]  Code Status:    Full Code    Chief Complaint:     Chief Complaint   Patient presents with    Cerebrovascular Accident       Interval hx:     The patient was seen and examined at bedside.  Remains severely aphasic, following commands with the left upper and lower extremities, withdrawing to pain in the right extremities. Awake at the time of my exam however intermittent e/o lethargy reported.       S/p PEG placement yesterday   Pending placement to ARU           Brief History of Present Illness:     Leland Eduardo II is a 64-year-old male with a history of HTN, HLD, CAD (s/p stent), CHF, COPD, and thrombocytopenia who was admitted to the Neuro ICU on 1/28/2025 following new-onset right-sided weakness and decreased alertness. Last known well was at 1415 on 1/28, when he was found by his wife slumped over. EMS found him hypertensive () and he was intubated for airway protection, receiving propofol, fentanyl, and Versed during transport. Upon ED arrival, initial imaging showed a large left basal ganglia/thalamic hemorrhage with significant mass effect (1.1 cm midline shift), IVH, and a 90% stenosis at the left ICA. He was started on a Cardene infusion for BP control (goal SBP <160), and 250cc 3% saline bolus was administered. Neurosurgery performed a craniotomy with hematoma evacuation. Post-op, the patient remained intubated and sedated, with some improvement in imaging and clinical status. He was extubated on 1/30, but developed fever and leukocytosis. Empiric antibiotics (Zosyn) were started on 2/1 with immediate improvement in WBC count. By the morning of 2/2, the patient 
and associated contrast blushing up to 3 mm.  The patient was started on a Cardene infusion to lower his systolic blood pressure to a goal of <160, and was given a 250cc bolus of 3% saline. Lab results showed mild hyponatremia (131) but were otherwise unremarkable, with normal coagulation and platelet counts. No known anticoagulants or antiplatelets were in use. The patient was hypothermic in the ED. Neurosurgery and neurocritical care were consulted for further management.  On exam in the ED, the patient remained intubated, with propofol held. His [place] Coma Scale (GCS) was E1, V NT, M5. He was able to localize to pain in the left upper extremity but showed no movement in the right upper extremity, which was flaccid. He withdrew in response to pain in both lower extremities.  The ICH score was calculated at 3, indicating a volume >30cc, GCS 6T, and the presence of intraventricular hemorrhage (IVH).    Allergies  has No Known Allergies.  Medications  Prior to Admission medications    Not on File    Scheduled Meds:   albuterol  2.5 mg Nebulization 4x Daily RT    sennosides-docusate sodium  2 tablet Oral Daily    bisacodyl  10 mg Rectal Daily    milk and molasses  240 mL Rectal Once    nystatin  5 mL Oral 4x Daily    hydroCHLOROthiazide  12.5 mg Oral Daily    polyethylene glycol  17 g Oral Daily    lisinopril  20 mg Per NG tube Daily    amLODIPine  10 mg Per NG tube Daily    [Held by provider] enoxaparin  40 mg SubCUTAneous Daily    pantoprazole (PROTONIX) 40 mg in sodium chloride (PF) 0.9 % 10 mL injection  40 mg IntraVENous Daily    sodium chloride flush  5-40 mL IntraVENous 2 times per day     Continuous Infusions:   sodium chloride 75 mL/hr at 02/11/25 1031    [Held by provider] sodium chloride Stopped (02/11/25 1028)    sodium chloride       PRN Meds:.albuterol sulfate HFA, magic (miracle) mouthwash, potassium chloride **OR** potassium alternative oral replacement **OR** potassium chloride, magnesium sulfate, 
hematoma, related to venous structures, and associated contrast blushing up to 3 mm.  The patient was started on a Cardene infusion to lower his systolic blood pressure to a goal of <160, and was given a 250cc bolus of 3% saline. Lab results showed mild hyponatremia (131) but were otherwise unremarkable, with normal coagulation and platelet counts. No known anticoagulants or antiplatelets were in use. The patient was hypothermic in the ED. Neurosurgery and neurocritical care were consulted for further management.  On exam in the ED, the patient remained intubated, with propofol held. His [place] Coma Scale (GCS) was E1, V NT, M5. He was able to localize to pain in the left upper extremity but showed no movement in the right upper extremity, which was flaccid. He withdrew in response to pain in both lower extremities.  The ICH score was calculated at 3, indicating a volume >30cc, GCS 6T, and the presence of intraventricular hemorrhage (IVH).    Allergies  has No Known Allergies.  Medications  Prior to Admission medications    Not on File    Scheduled Meds:   albuterol  2.5 mg Nebulization 4x Daily RT    sennosides-docusate sodium  2 tablet Oral Daily    bisacodyl  10 mg Rectal Daily    milk and molasses  240 mL Rectal Once    nystatin  5 mL Oral 4x Daily    hydroCHLOROthiazide  12.5 mg Oral Daily    polyethylene glycol  17 g Oral Daily    lisinopril  20 mg Per NG tube Daily    amLODIPine  10 mg Per NG tube Daily    enoxaparin  40 mg SubCUTAneous Daily    pantoprazole (PROTONIX) 40 mg in sodium chloride (PF) 0.9 % 10 mL injection  40 mg IntraVENous Daily    sodium chloride flush  5-40 mL IntraVENous 2 times per day     Continuous Infusions:   sodium chloride       PRN Meds:.albuterol sulfate HFA, magic (miracle) mouthwash, potassium chloride **OR** potassium alternative oral replacement **OR** potassium chloride, magnesium sulfate, ipratropium 0.5 mg-albuterol 2.5 mg, sodium chloride flush, sodium chloride, 
up to 3 mm.  The patient was started on a Cardene infusion to lower his systolic blood pressure to a goal of <160, and was given a 250cc bolus of 3% saline. Lab results showed mild hyponatremia (131) but were otherwise unremarkable, with normal coagulation and platelet counts. No known anticoagulants or antiplatelets were in use. The patient was hypothermic in the ED. Neurosurgery and neurocritical care were consulted for further management.  On exam in the ED, the patient remained intubated, with propofol held. His [place] Coma Scale (GCS) was E1, V NT, M5. He was able to localize to pain in the left upper extremity but showed no movement in the right upper extremity, which was flaccid. He withdrew in response to pain in both lower extremities.  The ICH score was calculated at 3, indicating a volume >30cc, GCS 6T, and the presence of intraventricular hemorrhage (IVH).    Allergies  has No Known Allergies.  Medications  Prior to Admission medications    Not on File    Scheduled Meds:   lisinopril  20 mg Per NG tube Daily    amLODIPine  10 mg Per NG tube Daily    enoxaparin  40 mg SubCUTAneous Daily    pantoprazole (PROTONIX) 40 mg in sodium chloride (PF) 0.9 % 10 mL injection  40 mg IntraVENous Daily    sodium chloride flush  5-40 mL IntraVENous 2 times per day     Continuous Infusions:   sodium chloride      sodium chloride 100 mL/hr at 02/03/25 1109     PRN Meds:.potassium chloride **OR** potassium alternative oral replacement **OR** potassium chloride, magnesium sulfate, ipratropium 0.5 mg-albuterol 2.5 mg, sodium chloride flush, sodium chloride, acetaminophen **OR** acetaminophen, ondansetron **OR** ondansetron, sulfur hexafluoride microspheres, labetalol, hydrALAZINE  Past Medical History   has no past medical history on file.  Past Surgical History   has a past surgical history that includes craniotomy (Left, 1/28/2025).  Social History   has no history on file for tobacco use.   has no history on file for 
2mm; round, reactive to light bilaterally.  Left gaze deviation, does not cross midline.   ENT:  moist mucous membranes   NECK:  supple, symmetric   LUNGS:  Equal air entry bilaterally, clear   CARDIOVASCULAR:  normal s1 / s2, RRR, distal pulses intact   ABDOMEN:  Soft, no rigidity, normal bowel sounds    NEUROLOGIC:  Mental Status:  intubated, alert, spontaneous movement in the left side, opens eyes to voice              Cranial Nerves:    II: Visual fields:  abnormal - blind to threat on right  III: Pupils:  abnormal- as above   III,IV,VI: Extra Ocular Movements: left gaze deviation   VII: Facial strength: abnormal - right facial droop     Motor Exam:    Moving the left upper and lower extremities purposefully.  Trace movement to pain in the right upper extremity, decreased tone.  Withdraws to pain in the right lower extremity briskly.     Reflexes:  Babinski upgoing on right, down on left              Investigations:        Laboratory Testing:  Recent Results (from the past 24 hour(s))   Basic Metabolic Panel    Collection Time: 02/17/25  3:47 AM   Result Value Ref Range    Sodium 136 136 - 145 mmol/L    Potassium 3.9 3.7 - 5.3 mmol/L    Chloride 105 98 - 107 mmol/L    CO2 23 20 - 31 mmol/L    Anion Gap 8 (L) 9 - 16 mmol/L    Glucose 125 (H) 74 - 99 mg/dL    BUN 15 8 - 23 mg/dL    Creatinine 0.6 (L) 0.7 - 1.2 mg/dL    Est, Glom Filt Rate >90 >60 mL/min/1.73m2    Calcium 8.4 (L) 8.6 - 10.4 mg/dL       Recent Labs     02/16/25  0706   WBC 23.2*   RBC 3.85*   HGB 12.0*   HCT 39.2*   .8   MCH 31.2   MCHC 30.6   RDW 11.9      MPV 11.7     Recent Labs     02/17/25  0347      K 3.9      CO2 23   BUN 15   CREATININE 0.6*   GLUCOSE 125*   CALCIUM 8.4*     Hemoglobin A1C   Date Value Ref Range Status   01/29/2025 5.2 4.0 - 6.0 % Final       Assessment :      Primary Problem  Intracranial hemorrhage (HCC)    Active Hospital Problems    Diagnosis Date Noted    Hypotension [I95.9] 02/15/2025    
biapical pleuroparenchymal  scarring.  2. Central cylindrical bronchiectasis with superimposed bronchial wall  thickening favored chronic bronchitis.  3. Secretions or layering mucus in the trachea and mainstem bronchi right  greater than left extending to the right bronchus intermedius with lower lobe  bilateral mucous plugging distally left greater than right with minimal  bronchiolitis involvement at the left lung base and a trace left effusion.  Follow-up to resolution given background high risk features.    MRI   2//8IMPRESSION:  1. Large heterogeneous acute hematoma in the left basal ganglia structures,  left thalamus and subthalamic area with extension into the left thalamus and  subthalamic area and surrounding areas of edema. There is midline shift from  left to right by 10 mm.  2. Beyond the areas of left sided hematoma and surrounding edema, as  mentioned above, there is no additional or new acute infarction in the brain.  3. Hemorrhage in the occipital horns of bilateral lateral ventricles and 3rd  ventricle.  4. Under the previous left craniotomy site, minimal left-sided subdural  hemorrhage with a thickness of 2 mm.  5. Evidence of mild chronic microvascular ischemic/aging changes in the  periventricular white matter.    CT head 2/7  1. Large area of intraparenchymal hemorrhage centered in the left basal   ganglia with surrounding areas of edema that are starting to resolve. There   is still mass effect along the left cerebral hemisphere sulci with associated   left-to-right midline shift measuring 9 mm which is worse.   2. Evolving subacute left MCA distribution infarcts, more pronounced in the   frontoparietal region, towards the vertex.   3. Slight improvement in hemorrhage in the ventricular system layering in the   occipital horns, particularly in the left lateral ventricle. Overall size of   the ventricular system is unchanged.   4. No new areas of hemorrhage.       CT sinuses no active sinusitis 
infection   EYES:  Right pupil 4mm, Left pupil 2mm; round, reactive to light bilaterally.  Left gaze deviation, does not cross midline.   ENT:  moist mucous membranes   NECK:  supple, symmetric   LUNGS:  Equal air entry bilaterally, clear   CARDIOVASCULAR:  normal s1 / s2, RRR, distal pulses intact   ABDOMEN:  Soft, no rigidity, normal bowel sounds    NEUROLOGIC:  Mental Status:  intubated, alert, spontaneous movement in the left side, opens eyes to voice              Cranial Nerves:    II: Visual fields:  abnormal - blind to threat on right  III: Pupils:  abnormal- as above   III,IV,VI: Extra Ocular Movements: left gaze deviation   VII: Facial strength: abnormal - right facial droop     Motor Exam:    Moving the left upper and lower extremities purposefully.  Trace movement to pain in the right upper extremity, decreased tone.  Withdraws to pain in the right lower extremity briskly.     Reflexes:  Babinski upgoing on right, down on left              Investigations:        Laboratory Testing:  Recent Results (from the past 24 hour(s))   CBC with Auto Differential    Collection Time: 02/08/25  8:15 AM   Result Value Ref Range    WBC 20.1 (H) 3.5 - 11.3 k/uL    RBC 5.48 4.21 - 5.77 m/uL    Hemoglobin 17.2 (H) 13.0 - 17.0 g/dL    Hematocrit 53.3 (H) 40.7 - 50.3 %    MCV 97.3 82.6 - 102.9 fL    MCH 31.4 25.2 - 33.5 pg    MCHC 32.3 28.4 - 34.8 g/dL    RDW 12.2 11.8 - 14.4 %    Platelets 277 138 - 453 k/uL    MPV 11.8 8.1 - 13.5 fL    NRBC Automated 0.0 0.0 per 100 WBC    Neutrophils % 78 (H) 36 - 65 %    Lymphocytes % 12 (L) 24 - 43 %    Monocytes % 7 3 - 12 %    Eosinophils % 2 1 - 4 %    Basophils % 0 0 - 2 %    Immature Granulocytes % 1 (H) 0 %    Neutrophils Absolute 15.72 (H) 1.50 - 8.10 k/uL    Lymphocytes Absolute 2.41 1.10 - 3.70 k/uL    Monocytes Absolute 1.44 (H) 0.10 - 1.20 k/uL    Eosinophils Absolute 0.31 0.00 - 0.44 k/uL    Basophils Absolute 0.08 0.00 - 0.20 k/uL    Immature Granulocytes Absolute 0.11 0.00 
sedated      OBJECTIVE:   Vitals: BP (!) 158/81 Comment: per lalo  Pulse 59   Temp 99.2 °F (37.3 °C)   Resp 18   Ht 1.88 m (6' 2\")   Wt 84.8 kg (187 lb)   SpO2 100%   BMI 24.01 kg/m²     Gen: No acute distress  MS: Intubated, sedated, does open eyes  CN: Pupils reactive  Motor: No spontaneous movements seen  Sens:Does localize to pain in all extremities  Gait: Deferred      LABS:     Recent Labs     01/28/25  1555 01/28/25  1900 01/29/25  0318 01/29/25  0618 01/29/25  0819 01/30/25  0513   WBC 5.8  --   --  11.6*  --  15.0*   HGB 12.6* 14.4  --  13.8  --  13.6   HCT 37.0* 42.0  --  39.9*  --  39.9*     --   --  188  --  180   * 137 138  --   --  136   K 4.4 3.2* 3.3*  --   --  3.3*   CL 99  --  105  --   --  104   CO2 24  --  23  --   --  22   BUN 8  --  7*  --   --  9   CREATININE 0.8  --  0.7  --   --  0.6*   MG  --   --  1.6  --   --  1.8   CALCIUM 8.3*  --  9.1  --   --  9.2   INR 1.2  --   --   --   --   --    NITRU  --   --   --   --  NEGATIVE  --    COLORU  --   --   --   --  Yellow  --      No results for input(s): \"ALKPHOS\", \"ALT\", \"AST\", \"BILITOT\", \"BILIDIR\", \"LABALBU\", \"AMYLASE\", \"LIPASE\" in the last 72 hours.    RADIOLOGY:   Images were personally reviewed including:  CT brain without contrast:   CTA/CTP imaging:       IMPRESSIONS:   The patient is a 64-year-old male with a chronic medical problems mentioned above presented to [facility] ED on 1/28/2025 via [facility] due to new onset right-sided weakness and decreased alertness.  Found to have large intraparenchymal hemorrhage in the left basal ganglia/thalamus with intraventricular extension, along with associated edema and a midline shift of 1.1 cm.    Left basal ganglia/thalamic intraparenchymal hemorrhage with intraventricular extension associated with vasogenic edema and midline shift, now s/p nsgy intervention  Mild obstructive hydrocephalus  abnormal vessels surrounding IPH of unclear etiology    PLANS:     -- SBP 
still opens eyes to voice.  Globally aphasic; nonverbal, not able to follow commands.   HEAD:  normocephalic, left-sided crani site clean and dry, no signs of infection   EYES:  Right pupil 4mm, Left pupil 2mm; round, reactive to light bilaterally.  Left gaze deviation, does not cross midline.   ENT:  moist mucous membranes   NECK:  supple, symmetric   LUNGS:  Equal air entry bilaterally, clear   CARDIOVASCULAR:  normal s1 / s2, RRR, distal pulses intact   ABDOMEN:  Soft, no rigidity, normal bowel sounds    NEUROLOGIC:  Mental Status:  intubated, alert, spontaneous movement in the left side, opens eyes to voice              Cranial Nerves:    II: Visual fields:  abnormal - blind to threat on right  III: Pupils:  abnormal- as above   III,IV,VI: Extra Ocular Movements: left gaze deviation   VII: Facial strength: abnormal - right facial droop     Motor Exam:    Moving the left upper and lower extremities purposefully.  Trace movement to pain in the right upper extremity, decreased tone.  Withdraws to pain in the right lower extremity briskly.     Reflexes:  Babinski upgoing on right, down on left              Investigations:        Laboratory Testing:  Recent Results (from the past 24 hour(s))   Lactic Acid    Collection Time: 02/15/25  1:12 PM   Result Value Ref Range    Lactic Acid, Whole Blood 2.6 (H) 0.7 - 2.1 mmol/L   Urinalysis with Microscopic    Collection Time: 02/15/25  2:02 PM   Result Value Ref Range    Color, UA Dark Yellow (A) Yellow    Turbidity UA Cloudy (A) Clear    Glucose, Ur NEGATIVE NEGATIVE mg/dL    Bilirubin, Urine NEGATIVE  Verified by ictotest. (A) NEGATIVE    Ketones, Urine TRACE (A) NEGATIVE mg/dL    Specific Gravity, UA 1.030 1.005 - 1.030    Urine Hgb SMALL (A) NEGATIVE    pH, Urine 6.0 5.0 - 8.0    Protein, UA 1+ (A) NEGATIVE mg/dL    Urobilinogen, Urine Normal 0.0 - 1.0 EU/dL    Nitrite, Urine POSITIVE (A) NEGATIVE    Leukocyte Esterase, Urine MODERATE (A) NEGATIVE    WBC, UA 50  0 
sulfate, ipratropium 0.5 mg-albuterol 2.5 mg, sodium chloride flush, sodium chloride, acetaminophen **OR** acetaminophen, ondansetron **OR** ondansetron, sulfur hexafluoride microspheres  Past Medical History   has no past medical history on file.  Past Surgical History   has a past surgical history that includes craniotomy (Left, 01/28/2025) and Esophagogastroduodenoscopy w/ PEG (02/11/2025).  Social History   has no history on file for tobacco use.   has no history on file for alcohol use.   has no history on file for drug use.  Family History  family history is not on file.    Review of Systems:  NA given global aphasia        OBJECTIVE:   Vitals: /87   Pulse 78   Temp 98 °F (36.7 °C) (Axillary)   Resp 20   Ht 1.88 m (6' 2\")   Wt 55.2 kg (121 lb 11.1 oz)   SpO2 (!) 85%   BMI 15.62 kg/m²     Gen: No acute distress  MS: Intermittenlty awake or lethargic, awakens to voice, severe global aphasia  CN: Pupils reactive, left gaze R hemianopia.  R face droop  Motor: Localizes RUE/RLE about 2/5 at best, Localizes LUE+LLE 3/5  Sens:Does localize to pain in all extremities  Gait: Deferred      LABS:     Recent Labs     02/10/25  0553 02/10/25  1659 02/10/25  2248 02/11/25  1504 02/11/25  2255   WBC 23.2*  --   --  14.2*  --    HGB 16.6  --   --  15.9  --    HCT 53.0*  --   --  52.4*  --      --   --  266  --    *   < > 153* 155* 152*   K 3.7  --   --  4.1  --    *  --   --  119*  --    CO2 26  --   --  24  --    BUN 45*  --   --  43*  --    CREATININE 1.1  --   --  1.0  --    CALCIUM 10.1  --   --  10.0  --     < > = values in this interval not displayed.     No results for input(s): \"ALKPHOS\", \"ALT\", \"AST\", \"BILITOT\", \"BILIDIR\", \"LABALBU\", \"AMYLASE\", \"LIPASE\" in the last 72 hours.    RADIOLOGY:   Images were personally reviewed including:  CT brain without contrast:   CTA/CTP imaging:       CT brain 1/29      CT Brain 2/8/25              IMPRESSIONS:   The patient is a 64-year-old male with 
pressure relief  Short Term Goal 2: Pt able to demonstrate sit to stand transfers with Efraín using least restrictive device  Short Term Goal 3: Pt tolerate sitting EOB x 15 minutes maintaining midline with CGA to prepare for functional mobility  Short Term Goal 4: Pt participate in 25 minutes activties and exercises to improve trunk control and muscle activation  Short Term Goal 5: Pt amb 25 ft with least restrictive device and modA    Minutes  PT Individual Minutes  Time In: 1055  Time Out: 1137  Minutes: 42  Time Code Minutes  Timed Code Treatment Minutes: 38 Minutes  Co- treatment with OT warranted secondary to decreased patient safety and independence with functional mobility requiring skilled physical assistance of two professionals to simultaneously address individualized discipline goals. PT is addressing bed mobility, transfers, sitting balance , while OT is addressing their individualized functional mobility/self-care task.      Electronically signed by Timoteo Bess PTA on 2/2/25 at 1:28 PM EST      
weight bearing through R UE onto wedges.  pt intermittently flinging self down to side lying througout session.    Exercise  PT Exercises  Exercise Treatment: Sitting: PROM/AAROM BLE mini marches, LAQ, ankle pumps, ABD/ADD, HS stretch, calf stretches 10x ea.  Supine: BLE AAROM-PROM: ankle pumps, SLR, ABD/ADD, heel slides. 10x ea. Pt overall PROM for all movements on R side  Dynamic Sitting Balance Exercises: Pt tolerated sitting EOB ~30 minutes total requiring maxA for balance. Pt performed lateral weight shifts x5 reps onto R UE with maxA required throughout. Pt able to intermittently acheive modA for leaning toward L to achieve midline for very short periods of time. Pt performed anterior/post weight shifts 5x ea requiring maxA to prevent ant/post LOB. Pt frequently pushing/pulling with L UE while seated EOB.    Patient Education  Patient Education  Education Given To: Patient  Education Provided: Role of Therapy;Plan of Care;Home Exercise Program  Education Method: Verbal  Barriers to Learning: Cognition  Education Outcome: Continued education needed;Unable to demonstrate understanding    Plan  Physical Therapy Plan  General Plan:  (5-6x/ week)  Current Treatment Recommendations: Strengthening, Balance training, Functional mobility training, Transfer training, Endurance training, Neuromuscular re-education, Gait training, Therapeutic activities, Safety education & training, Patient/Caregiver education & training    Goals  Short Term Goals  Time Frame for Short Term Goals: 14 visits  Short Term Goal 1: Pt able to demonstrate rolling and bed mobility with Efraín to allow pressure relief  Short Term Goal 2: Pt able to demonstrate sit to stand transfers with Efraín using least restrictive device  Short Term Goal 3: Pt tolerate sitting EOB x 15 minutes maintaining midline with CGA to prepare for functional mobility  Short Term Goal 4: Pt participate in 25 minutes activties and exercises to improve trunk control and muscle 
EOB x 15 minutes maintaining midline with CGA to prepare for functional mobility  Short Term Goal 4: Pt participate in 25 minutes activties and exercises to improve trunk control and muscle activation  Short Term Goal 5: Pt amb 25 ft with least restrictive device and modA    Minutes  PT Individual Minutes  Time In: 0826  Time Out: 0849  Minutes: 23  Time Code Minutes  Timed Code Treatment Minutes: 23 Minutes  Co- treatment with OT warranted secondary to decreased patient safety and independence with functional mobility requiring skilled physical assistance of two professionals to simultaneously address individualized discipline goals. PT is addressing LE strengthening and sitting balance, while OT is addressing their individualized functional mobility/self-care task.      Electronically signed by Joel Crooks PTA on 2/18/25 at 11:08 AM EST      
global aphasia on exam and right-sided weakness upper> lower extremities. Nutrition needs, likely will need PEG tube.     Left hemispheric ICH (ICH score 3)  POD #7 left craniotomy and hematoma evacuation  Etiology likely hypertensive  Goal SBP<160  Continue Norvasc 10 mg p.o. daily  Continue lisinopril 20 mg p.o. daily     Nutrition  Tolerating tube feeds via NG at goal (65 cc/h)  Free water flushes increased to q4h   Change NS to half normal   Consult GI for PEG tube      Leukocytosis, source under investigation   Patient febrile with leukocytosis (13 today)   Started empiric Zosyn with improvement, last day        PT/OT/SLP/Social work all consulted  Diet: Tube feeds via NG  DVT Prophylaxis: Lovenox 40 mg SC daily  Discharge Planning: TBD, likely SNF    Follow-up further recommendations after discussing case with the attending.  The plan was discussed with the patient, patient's family and the medical staff.   Consultations:   IP CONSULT TO NEUROSURGERY  IP CONSULT TO NEUROCRITICAL CARE  IP CONSULT TO ENDOVASCULAR NEUROSURGERY  IP CONSULT TO CASE MANAGEMENT  IP CONSULT TO DIETITIAN    Patient is admitted as inpatient status because of co-morbidities listed above, severity of signs and symptoms as outlined, requirement for current medical therapies and most importantly because of direct risk to patient if care not provided in a hospital setting.    Sangeeta Lockhart MD  Neurology Resident PGY-2   2/4/2025  8:29 AM    Copy sent to Dr. Thomson primary care provider on file.   
wheezing, rhonchi or rales.      Comments: Whitish suction  Chest:      Chest wall: No tenderness.   Abdominal:      General: There is no distension.      Tenderness: There is no abdominal tenderness. There is no guarding.      Comments: PEG tube in place   Musculoskeletal:         General: No swelling, tenderness, deformity or signs of injury.      Cervical back: Neck supple. No rigidity or tenderness.      Right lower leg: No edema.      Left lower leg: No edema.   Skin:     Coloration: Skin is not jaundiced or pale.      Findings: No bruising, erythema, lesion or rash.   Neurological:      Mental Status: He is alert.      Comments: lethargic   Psychiatric:      Comments: lethargic         Past Medical History:   History reviewed. No pertinent past medical history.    Past Surgical  History:     Past Surgical History:   Procedure Laterality Date    CRANIOTOMY Left 01/28/2025    E0 CRANIOTOMY HEMATOMA EVACUATION performed by Ronal Harrell MD at [facility] OR    ESOPHAGOGASTRODUODENOSCOPY W/ PEG  02/11/2025    UPPER GASTROINTESTINAL ENDOSCOPY N/A 2/11/2025    **ADD ON**ESOPHAGOGASTRODUODENOSCOPY PERCUTANEOUS ENDOSCOPIC GASTROSTOMY TUBE INSERTION performed by Rachael Whittaker MD at [facility] OR       Medications:      [START ON 2/15/2025] albuterol  2.5 mg Nebulization BID RT    sennosides-docusate sodium  2 tablet Oral Daily    bisacodyl  10 mg Rectal Daily    milk and molasses  240 mL Rectal Once    nystatin  5 mL Oral 4x Daily    hydroCHLOROthiazide  12.5 mg Oral Daily    polyethylene glycol  17 g Oral Daily    lisinopril  20 mg Per NG tube Daily    amLODIPine  10 mg Per NG tube Daily    enoxaparin  40 mg SubCUTAneous Daily    pantoprazole (PROTONIX) 40 mg in sodium chloride (PF) 0.9 % 10 mL injection  40 mg IntraVENous Daily    sodium chloride flush  5-40 mL IntraVENous 2 times per day       Social History:     Social History     Socioeconomic History    Marital status:      Spouse name: Not on file    Number of 
cm with surrounding vasogenic edema and mass   effect with 1.1 cm left to right midline shift.   2. Intraventricular extension of hemorrhage into the left greater than right   lateral ventricles and to a lesser extent the 3rd and 4th ventricles with   mild dilatation of the left greater than right lateral ventricles.   3. Small amount of subarachnoid hemorrhage within the left sylvian fissure.   Findings were discussed with Dr. Mark Pinon at 4:17 p.m. on 01/28/2025.              Labs and Images reviewed with:  [x] Dr. Tashi Hallman  [] Dr. Anthony Pulliam  [] There are no new interval images to review.     PHYSICAL EXAM       CONSTITUTIONAL:  Drowsy, opens eyes to voice.  Globally aphasic; nonverbal, not able to follow commands.   HEAD:  normocephalic, left-sided crani site covered by dry dressing with no new drainage   EYES:  Right pupil 4mm, Left pupil 2mm; round, reactive to light bilaterally.  Left gaze deviation, does not cross midline.   ENT:  moist mucous membranes   NECK:  supple, symmetric   LUNGS:  Equal air entry bilaterally, clear   CARDIOVASCULAR:  normal s1 / s2, RRR, distal pulses intact   ABDOMEN:  Soft, no rigidity, normal bowel sounds    NEUROLOGIC:  Mental Status:  intubated, alert, spontaneous movement in the left side, opens eyes to voice              Cranial Nerves:    II: Visual acuity:  abnormal - absent blind to threat on right  III: Pupils:  abnormal- as above   III,IV,VI: Extra Ocular Movements: abnormal left gaze deviation   VII: Facial strength: abnormal - right facial droop    Motor Exam:    Moving the left upper and lower extremities purposefully.  No movement to pain in the right upper extremity, decreased tone.  Withdraws to pain in the right lower extremity briskly.        DRAINS:  [x] Delcid, remove today.     ASSESSMENT AND PLAN:       The patient is a 63 yo male with history of HTN, HLD, CAD (s/p stent), CHF, COPD and thrombocytopenia who presented to [facility] ED on 1/28/2025 
via [facility] for new onset right sided weakness, decreased level of alertness.  Last known well 1/28 around 1415.  Intubated in the field.  Initial SBP>200 arrival to ED.  CT head showed large left basal ganglia/thalamus intraparenchymal hemorrhage with intraventricular extension, with associated edema and midline shift (1.1cm).  Neurosurgery consulted.  Admitted to the neuro ICU for further management.  Taken to OR by Dr. Kat for left-sided craniotomy with hematoma evacuation.     Patient care will be discussed with attending, will reevaluate patient along with attending.     Patient Active Problem List   Diagnosis    Intracranial hemorrhage (HCC)    Hemorrhagic stroke (HCC)    Hypertensive emergency    Cerebral edema (HCC)    Acute respiratory failure with hypoxia     NEUROLOGIC:  Large left basal ganglia/thalamus intraparenchymal hemorrhage with intraventricular extension, surrounding vasogenic edema and midline shift (brain compression)  Mild obstructive hydrocephalus  - Etiology likely secondary to hypertension  - POD# 5 s/p left sided crani for hematoma evacuation   - Repeat CT Head without contrast 1/29 showed partial evacuation of the left-sided IPH, persistent IVH, improvement in mass effect/midline shift, mild pneumocephalus  - Neurosurgery following; appreciate recs  - Neuro Endovascular following; reviewed CTA, no obvious AVM, no plans for DSA at this time   - Goal SBP<160  - Neuro checks per protocol    CARDIOVASCULAR:  Essential hypertension, Hypertensive emergency  History of hyperlipidemia  History of CAD (s/p stent), CHF  - Goal SBP <160  - PRN Labetalol/Hydralazine   - Continue Norvasc 10mg QD  - Start Lisinopril 10mg QD for better BP control  - Off cardene since 1/31  - Echo 1/29: EF 55%, mild tricuspid regurgitation, mild pulmonary HTN (RVSP 35 mmHg), trace to small pericardial effusion  - Continue telemetry    PULMONARY:  Acute hypoxic respiratory failure secondary to above; resolved

## 2025-02-20 ENCOUNTER — TELEPHONE (OUTPATIENT)
Age: 65
End: 2025-02-20

## 2025-02-20 LAB
MICROORGANISM SPEC CULT: NORMAL
MICROORGANISM SPEC CULT: NORMAL
SERVICE CMNT-IMP: NORMAL
SERVICE CMNT-IMP: NORMAL
SPECIMEN DESCRIPTION: NORMAL
SPECIMEN DESCRIPTION: NORMAL

## 2025-02-20 NOTE — DISCHARGE SUMMARY
[facility]     Department of Neurology    INPATIENT DISCHARGE SUMMARY        Patient Identification:  Leland Eduardo II is a 65 y.o. male.  :  1960  MRN: [id number]     Acct: [id number]   Admit Date:  2025  Discharge date and time: 2025  8:57 PM   Attending Provider: No att. providers found                                     Admission Diagnoses:   Intracranial hemorrhage (HCC) [I62.9]  Hemorrhagic stroke (HCC) [I61.9]  Hypertensive emergency [I16.1]    Discharge Diagnoses:   Principal Problem:    Intracranial hemorrhage (HCC)  Active Problems:    Hemorrhagic stroke (HCC)    Hypertensive emergency    Cerebral edema (HCC)    Acute respiratory failure with hypoxia (HCC)    Aspiration pneumonia (HCC)    Severe malnutrition    Oropharyngeal dysphagia    Fever    Bandemia    Thalamic hemorrhage (HCC)    IVH (intraventricular hemorrhage) (HCC)    Midline shift of brain    Hypernatremia    Hypotension    Cystitis  Resolved Problems:    * No resolved hospital problems. *       Consults:   ID, GI rehabilitation medicine, and endovascular surgery, PT/OT/SLP    Brief Inpatient course:    Leland Eduardo II is a 64-year-old male with a history of HTN, HLD, CAD (s/p stent), CHF, COPD, and thrombocytopenia who was admitted to the Neuro ICU on 2025 following new-onset right-sided weakness and decreased alertness. Last known well was at 1415 on , when he was found by his wife slumped over. EMS found him hypertensive () and he was intubated for airway protection, receiving propofol, fentanyl, and Versed during transport. Upon ED arrival, initial imaging showed a large left basal ganglia/thalamic hemorrhage with significant mass effect (1.1 cm midline shift), IVH, mild obstructive hydrocephalus and (ICH score 3) and a 90% stenosis at the left ICA. He was started on a Cardene infusion for BP control (goal SBP <160), and 250cc 3% saline bolus was administered. Neurosurgery

## 2025-02-20 NOTE — TELEPHONE ENCOUNTER
Pt had a craniotomy hematoma evacuation on 01/28/2025 at [facility], was transferred to [facility] on 02/18/2025 with staples to incision intact. Per Dr. Harrell, skilled nursing staff at pt's SNF may remove incisional staples.  Order faxed to SNF at [phone]. Facility notified by writer.

## 2025-02-28 ENCOUNTER — OFFICE VISIT (OUTPATIENT)
Age: 65
End: 2025-02-28

## 2025-02-28 VITALS
BODY MASS INDEX: 15.14 KG/M2 | SYSTOLIC BLOOD PRESSURE: 96 MMHG | DIASTOLIC BLOOD PRESSURE: 72 MMHG | HEIGHT: 74 IN | RESPIRATION RATE: 15 BRPM | WEIGHT: 118 LBS | HEART RATE: 83 BPM

## 2025-02-28 DIAGNOSIS — I62.9 INTRACRANIAL HEMORRHAGE (HCC): Primary | ICD-10-CM

## 2025-02-28 PROCEDURE — 99024 POSTOP FOLLOW-UP VISIT: CPT | Performed by: NEUROLOGICAL SURGERY

## 2025-02-28 NOTE — PROGRESS NOTES
[facility], [facility] NEUROSCIENCE [place], [facility] NEUROSURGERY  [address], [address]  [place] [address]  Dept: [phone]  Dept Fax: [phone]     Patient:  Leland Eduardo II  YOB: 1960  Date: 2/28/25      Chief Complaint   Patient presents with    Follow-Up from Hospital     post op - craniotomy 1/28/25 - pt dc on 2/18/25           HPI:     Patient is a 64-year-old male who presented unresponsive to the hospital requiring intubation.  Head CT demonstrated a large left basal ganglier subinsular hematoma with mass effect.  The patient was taken for surgical evacuation of the blood clot on January 28, 2025.  The patient presents to my office today in a wheelchair.  He is nonverbal and has a dense expressive aphasia.    Examination today demonstrates his scalp incision to be healing well.  Incision is flat, nonerythematous nontender.  Patient is awake and following commands.  He has a dense expressive aphasia.  Pupils are 3 mm equal and reactive to light.  The patient does have no movement of the right upper and right lower extremity.  He is strong in his left upper and left lower extremity.    Patient is a 64-year-old male who was taken emergently to surgery for a large left basal ganglia hemorrhage/hematoma 1 month ago.  Currently he has a dense expressive aphasia with right hemiplegia.  At this point time we will proceed with a follow-up head CT.  Additionally I am scheduling the patient return to my office in 2 months with a follow-up head CT at that time as well.  Given he did have a dominant left sided brain injury, the expressive aphasia and right-sided hemiplegia are not to be unexpected.  I did discuss this with the patient in the office today.  I will be seeing him back in my office in 2 months in follow-up.        Physical Exam:      BP 96/72 (Site: Left Upper Arm, Position: Sitting, Cuff Size: Large Adult)   Pulse 83   Resp

## 2025-03-19 ENCOUNTER — HOSPITAL ENCOUNTER (OUTPATIENT)
Dept: CT IMAGING | Age: 65
Discharge: HOME | End: 2025-03-21
Attending: NEUROLOGICAL SURGERY
Payer: MEDICARE

## 2025-03-19 DIAGNOSIS — I62.9 INTRACRANIAL HEMORRHAGE (HCC): ICD-10-CM

## 2025-03-19 PROCEDURE — 70450 CT HEAD/BRAIN W/O DYE: CPT

## 2025-04-11 ENCOUNTER — OFFICE VISIT (OUTPATIENT)
Dept: NEUROLOGY | Age: 65
End: 2025-04-11
Payer: MEDICARE

## 2025-04-11 VITALS
SYSTOLIC BLOOD PRESSURE: 80 MMHG | DIASTOLIC BLOOD PRESSURE: 44 MMHG | HEART RATE: 74 BPM | HEIGHT: 74 IN | BODY MASS INDEX: 15.14 KG/M2 | WEIGHT: 118 LBS

## 2025-04-11 DIAGNOSIS — Z86.79 HISTORY OF IDIOPATHIC SPONT INTRAPARENCHYMAL INTRACRANIAL HEMORRHAGE: ICD-10-CM

## 2025-04-11 DIAGNOSIS — I69.151 HEMIPARESIS OF RIGHT DOMINANT SIDE AS LATE EFFECT OF NONTRAUMATIC INTRACEREBRAL HEMORRHAGE (HCC): ICD-10-CM

## 2025-04-11 DIAGNOSIS — I65.22 STENOSIS OF LEFT CAROTID ARTERY GREATER THAN 50%: Primary | ICD-10-CM

## 2025-04-11 PROCEDURE — 1123F ACP DISCUSS/DSCN MKR DOCD: CPT | Performed by: STUDENT IN AN ORGANIZED HEALTH CARE EDUCATION/TRAINING PROGRAM

## 2025-04-11 PROCEDURE — 4004F PT TOBACCO SCREEN RCVD TLK: CPT | Performed by: STUDENT IN AN ORGANIZED HEALTH CARE EDUCATION/TRAINING PROGRAM

## 2025-04-11 PROCEDURE — 1159F MED LIST DOCD IN RCRD: CPT | Performed by: STUDENT IN AN ORGANIZED HEALTH CARE EDUCATION/TRAINING PROGRAM

## 2025-04-11 PROCEDURE — 3017F COLORECTAL CA SCREEN DOC REV: CPT | Performed by: STUDENT IN AN ORGANIZED HEALTH CARE EDUCATION/TRAINING PROGRAM

## 2025-04-11 PROCEDURE — G8419 CALC BMI OUT NRM PARAM NOF/U: HCPCS | Performed by: STUDENT IN AN ORGANIZED HEALTH CARE EDUCATION/TRAINING PROGRAM

## 2025-04-11 PROCEDURE — 99215 OFFICE O/P EST HI 40 MIN: CPT | Performed by: STUDENT IN AN ORGANIZED HEALTH CARE EDUCATION/TRAINING PROGRAM

## 2025-04-11 PROCEDURE — G8427 DOCREV CUR MEDS BY ELIG CLIN: HCPCS | Performed by: STUDENT IN AN ORGANIZED HEALTH CARE EDUCATION/TRAINING PROGRAM

## 2025-04-11 PROCEDURE — 3078F DIAST BP <80 MM HG: CPT | Performed by: STUDENT IN AN ORGANIZED HEALTH CARE EDUCATION/TRAINING PROGRAM

## 2025-04-11 PROCEDURE — 3074F SYST BP LT 130 MM HG: CPT | Performed by: STUDENT IN AN ORGANIZED HEALTH CARE EDUCATION/TRAINING PROGRAM

## 2025-04-11 RX ORDER — FAMOTIDINE 20 MG/1
20 TABLET, FILM COATED ORAL DAILY
COMMUNITY
Start: 2025-03-15

## 2025-04-11 RX ORDER — SULFAMETHOXAZOLE AND TRIMETHOPRIM 800; 160 MG/1; MG/1
1 TABLET ORAL 2 TIMES DAILY
COMMUNITY

## 2025-04-11 RX ORDER — GUAIFENESIN 400 MG/1
400 TABLET ORAL 2 TIMES DAILY PRN
COMMUNITY

## 2025-04-11 NOTE — PROGRESS NOTES
Endovascular Neurosurgery Note    Pt Name: Leland Eduardo II  MRN: [id number]  YOB: 1960  Date of evaluation: 4/11/2025  Primary Care Physician: Rodrigue Feng MD  Patient evaluated at the request of  Tashi Gallego MD    Reason for evaluation: abnormal vessels surrounding IPH of unclear etiology     SUBJECTIVE:     Presents to clinic. No new complaints or focal neuro deficits. Not on any AC/AP agents. No recurrence of hemorrhage. Recovering well neurologically in the SNF but continues wheelchair bound.    HPI:     History of Chief Complaint:    The patient is a 64-year-old male with a medical history of hypertension, hyperlipidemia, coronary artery disease (status post stent), congestive heart failure, COPD, and thrombocytopenia, who presented to [facility] ED on 1/28/2025 via [facility] for new onset right-sided weakness and decreased alertness. His last known well time was around 1415 on 1/28, when his wife found him slumped over to his right. Upon EMS arrival, his blood pressure was hypertensive, with a systolic reading of 194. By the time [facility] intervened, the patient was unable to manage his secretions and was intubated for airway protection. He was sedated with propofol and also received fentanyl and Versed during transport. Upon arrival at the ED, the patient remained intubated and on propofol, which was temporarily held. His initial systolic blood pressure was greater than 200.  A CT head without contrast revealed a large acute left basal ganglia/thalamus intraparenchymal hemorrhage extending into the left frontal lobe, with associated vasogenic edema and a mass effect (1.1 cm midline shift), along with intraventricular extension into the lateral, third, and fourth ventricles, causing mild dilation of the left lateral ventricle more than the right. A CTA head/neck with contrast showed 90% stenosis at the origin of the left internal carotid artery (ICA), with vessels at

## 2025-04-19 PROBLEM — Z86.79 HISTORY OF IDIOPATHIC SPONT INTRAPARENCHYMAL INTRACRANIAL HEMORRHAGE: Status: ACTIVE | Noted: 2025-04-19

## 2025-04-19 PROBLEM — I62.9 INTRACRANIAL HEMORRHAGE (HCC): Status: RESOLVED | Noted: 2025-01-28 | Resolved: 2025-04-19

## 2025-04-19 PROBLEM — I61.9 HEMORRHAGIC STROKE (HCC): Status: RESOLVED | Noted: 2025-01-28 | Resolved: 2025-04-19

## 2025-04-19 PROBLEM — G93.6 CEREBRAL EDEMA (HCC): Status: RESOLVED | Noted: 2025-01-28 | Resolved: 2025-04-19

## 2025-04-19 PROBLEM — I69.151 HEMIPARESIS OF RIGHT DOMINANT SIDE AS LATE EFFECT OF NONTRAUMATIC INTRACEREBRAL HEMORRHAGE (HCC): Status: ACTIVE | Noted: 2025-04-19

## 2025-04-19 PROBLEM — I65.22 STENOSIS OF LEFT CAROTID ARTERY GREATER THAN 50%: Status: ACTIVE | Noted: 2025-04-19

## 2025-04-19 PROBLEM — J96.01 ACUTE RESPIRATORY FAILURE WITH HYPOXIA: Status: RESOLVED | Noted: 2025-01-28 | Resolved: 2025-04-19

## 2025-04-19 PROBLEM — I61.5 IVH (INTRAVENTRICULAR HEMORRHAGE) (HCC): Status: RESOLVED | Noted: 2025-02-08 | Resolved: 2025-04-19

## 2025-04-28 ENCOUNTER — HOSPITAL ENCOUNTER (OUTPATIENT)
Dept: CT IMAGING | Age: 65
Discharge: HOME | End: 2025-04-30
Attending: NEUROLOGICAL SURGERY
Payer: MEDICARE

## 2025-04-28 DIAGNOSIS — I62.9 INTRACRANIAL HEMORRHAGE (HCC): ICD-10-CM

## 2025-04-28 PROCEDURE — 70450 CT HEAD/BRAIN W/O DYE: CPT

## 2025-05-07 ENCOUNTER — OFFICE VISIT (OUTPATIENT)
Age: 65
End: 2025-05-07
Payer: MEDICARE

## 2025-05-07 VITALS
BODY MASS INDEX: 15.14 KG/M2 | HEART RATE: 76 BPM | SYSTOLIC BLOOD PRESSURE: 89 MMHG | DIASTOLIC BLOOD PRESSURE: 62 MMHG | WEIGHT: 118 LBS | HEIGHT: 74 IN

## 2025-05-07 DIAGNOSIS — I62.9 INTRACRANIAL HEMORRHAGE (HCC): Primary | ICD-10-CM

## 2025-05-07 PROCEDURE — 1160F RVW MEDS BY RX/DR IN RCRD: CPT | Performed by: PHYSICIAN ASSISTANT

## 2025-05-07 PROCEDURE — 1159F MED LIST DOCD IN RCRD: CPT | Performed by: PHYSICIAN ASSISTANT

## 2025-05-07 PROCEDURE — 3017F COLORECTAL CA SCREEN DOC REV: CPT | Performed by: PHYSICIAN ASSISTANT

## 2025-05-07 PROCEDURE — 3074F SYST BP LT 130 MM HG: CPT | Performed by: PHYSICIAN ASSISTANT

## 2025-05-07 PROCEDURE — G8419 CALC BMI OUT NRM PARAM NOF/U: HCPCS | Performed by: PHYSICIAN ASSISTANT

## 2025-05-07 PROCEDURE — 4004F PT TOBACCO SCREEN RCVD TLK: CPT | Performed by: PHYSICIAN ASSISTANT

## 2025-05-07 PROCEDURE — 99213 OFFICE O/P EST LOW 20 MIN: CPT | Performed by: PHYSICIAN ASSISTANT

## 2025-05-07 PROCEDURE — 1123F ACP DISCUSS/DSCN MKR DOCD: CPT | Performed by: PHYSICIAN ASSISTANT

## 2025-05-07 PROCEDURE — G8427 DOCREV CUR MEDS BY ELIG CLIN: HCPCS | Performed by: PHYSICIAN ASSISTANT

## 2025-05-07 PROCEDURE — 3078F DIAST BP <80 MM HG: CPT | Performed by: PHYSICIAN ASSISTANT

## 2025-05-07 RX ORDER — CIPROFLOXACIN 250 MG/1
250 TABLET, FILM COATED ORAL 2 TIMES DAILY
COMMUNITY

## 2025-05-07 NOTE — PROGRESS NOTES
I had the pleasure of seeing Leland Eduardo II in the office today in follow up. Patient is a 65 y.o.. male who underwent large left basal ganglia hematoma evacuation 3 months ago.  He presents today in a wheelchair and is nonverbal with a dense expressive aphasia.  Over the past 3 months his wife states she does feel he has a better understanding of what is going on however she has noted no improvement in his strength on his right side or speech.  Vitals:    05/07/25 1344   BP: (!) 89/62   Pulse: 76     Examination today demonstrates the incision to be healing well. The incision is nonerythematous and nontender.  Patient has age-appropriate strength in both upper and lower extremities on the left, no movement on the right.    Follow-up head CT performed approximately 1 week ago demonstrates stable left frontal lobe and left basal ganglia encephalopathic changes.  No acute intracranial findings.  I reviewed remaining activity restrictions and limitations for the upcoming months.  I did take this opportunity to answer any remaining questions the patient had in the office today and welcomed them to feel free to contact us back at any point in the interim should they have any problems or questions we could be of assistance with. We will plan on seeing the patient back in our office in 3 months for final postoperative visit.

## 2025-08-13 ENCOUNTER — OFFICE VISIT (OUTPATIENT)
Age: 65
End: 2025-08-13
Payer: MEDICARE

## 2025-08-13 VITALS
HEART RATE: 95 BPM | SYSTOLIC BLOOD PRESSURE: 132 MMHG | BODY MASS INDEX: 15.14 KG/M2 | WEIGHT: 118 LBS | HEIGHT: 74 IN | DIASTOLIC BLOOD PRESSURE: 80 MMHG

## 2025-08-13 DIAGNOSIS — I61.0 THALAMIC HEMORRHAGE (HCC): Primary | ICD-10-CM

## 2025-08-13 PROCEDURE — 1160F RVW MEDS BY RX/DR IN RCRD: CPT | Performed by: PHYSICIAN ASSISTANT

## 2025-08-13 PROCEDURE — 4004F PT TOBACCO SCREEN RCVD TLK: CPT | Performed by: PHYSICIAN ASSISTANT

## 2025-08-13 PROCEDURE — 3017F COLORECTAL CA SCREEN DOC REV: CPT | Performed by: PHYSICIAN ASSISTANT

## 2025-08-13 PROCEDURE — G8427 DOCREV CUR MEDS BY ELIG CLIN: HCPCS | Performed by: PHYSICIAN ASSISTANT

## 2025-08-13 PROCEDURE — 1159F MED LIST DOCD IN RCRD: CPT | Performed by: PHYSICIAN ASSISTANT

## 2025-08-13 PROCEDURE — 99213 OFFICE O/P EST LOW 20 MIN: CPT | Performed by: PHYSICIAN ASSISTANT

## 2025-08-13 PROCEDURE — 3075F SYST BP GE 130 - 139MM HG: CPT | Performed by: PHYSICIAN ASSISTANT

## 2025-08-13 PROCEDURE — 1123F ACP DISCUSS/DSCN MKR DOCD: CPT | Performed by: PHYSICIAN ASSISTANT

## 2025-08-13 PROCEDURE — 3079F DIAST BP 80-89 MM HG: CPT | Performed by: PHYSICIAN ASSISTANT

## 2025-08-13 PROCEDURE — G8419 CALC BMI OUT NRM PARAM NOF/U: HCPCS | Performed by: PHYSICIAN ASSISTANT

## (undated) DEVICE — STRAP,POSITIONING,KNEE/BODY,FOAM,4X60": Brand: MEDLINE

## (undated) DEVICE — GLOVE ORANGE PI 7 1/2   MSG9075

## (undated) DEVICE — AVANOS* QUINCKE NEEDLE: Brand: AVANOS

## (undated) DEVICE — Device

## (undated) DEVICE — SUTURE VICRYL SZ 3-0 L18IN ABSRB UD L26MM SH 1/2 CIR J864D

## (undated) DEVICE — AGENT HEMOSTATIC SURGIFLOW MATRIX KIT W/THROMBIN

## (undated) DEVICE — PATIENT TRACKER 9734887 NON-INVASIVE

## (undated) DEVICE — Z INACTIVE USE 2423038 APPLICATOR MEDICATED 10.5 CC CHLORHEXIDINE GLUC 2%

## (undated) DEVICE — ELECTRODE PT RET AD L9FT HI MOIST COND ADH HYDRGEL CORDED

## (undated) DEVICE — 2.3MM TAPERED ROUTER

## (undated) DEVICE — DRAPE,REIN 53X77,STERILE: Brand: MEDLINE

## (undated) DEVICE — BLADE CLP TAPR HD WET DRY CAPABILITY GTT IN CHARGING USE

## (undated) DEVICE — GOWN,AURORA,NONREINFORCED,LARGE: Brand: MEDLINE

## (undated) DEVICE — GARMENT,MEDLINE,DVT,INT,CALF,MED, GEN2: Brand: MEDLINE

## (undated) DEVICE — MIC* SAFETY PERCUTANEOUS ENDOSCOPIC GASTROSTOMY PEG KIT - 20 FR - PULL: Brand: MIC PEG TUBE

## (undated) DEVICE — GLOVE SURG SZ 65 CRM LTX FREE POLYISOPRENE POLYMER BEAD ANTI

## (undated) DEVICE — GOWN,SIRUS,NONRNF,SETINSLV,XL,20/CS: Brand: MEDLINE

## (undated) DEVICE — BINDER ABD SM M W9IN FOR 30 45IN 3 PNL E CNTCT CLSR POSTOP

## (undated) DEVICE — 1LYRTR 16FR10ML100%SIL UMS SNP: Brand: MEDLINE INDUSTRIES, INC.

## (undated) DEVICE — SUTURE VICRYL SZ 2-0 L18IN ABSRB VLT L26MM SH 1/2 CIR J775D

## (undated) DEVICE — GLOVE ORANGE PI 7   MSG9070

## (undated) DEVICE — LARGE BLUNT, DUAL PACK: Brand: LINA SKIN HOOK™

## (undated) DEVICE — GLOVE ORANGE PI 8   MSG9080

## (undated) DEVICE — ZYPHR DISPOSABLE CRANIAL PERFORATOR, LARGE 14/11MM: Brand: ZYPHR

## (undated) DEVICE — TRAY PAIN CUST

## (undated) DEVICE — SUTURE VICRYL + SZ 0 L18IN ABSRB UD L36MM CT-1 1/2 CIR VCP840D

## (undated) DEVICE — BANDAGE ADH DIA7/8IN NAT FLEX-FABRIC WVN FOR WND PROTCT

## (undated) DEVICE — SYRINGE MED 7ML PLAS LUERSLIP LOSS OF RESISTANCE EPILOR

## (undated) DEVICE — GLOVE SURG SZ 65 L12IN FNGR THK79MIL GRN LTX FREE

## (undated) DEVICE — STANDARD HYPODERMIC NEEDLE,POLYPROPYLENE HUB: Brand: MONOJECT

## (undated) DEVICE — GLOVE SURG SZ 75 L12IN FNGR THK79MIL GRN LTX FREE

## (undated) DEVICE — MARKER,SKIN,WI/RULER AND LABELS: Brand: MEDLINE

## (undated) DEVICE — SUTURE VICRYL SZ 2-0 L18IN ABSRB UD CT-1 L36MM 1/2 CIR J839D

## (undated) DEVICE — SUTURE NRLN SZ 4-0 L18IN NONABSORBABLE BLK L13MM TF 1/2 CIR C584D

## (undated) DEVICE — CLAMP SCALP STR EDGE HVY

## (undated) DEVICE — STRAP ARMBRD W1.5XL32IN FOAM STR YET SFT W/ HK AND LOOP